# Patient Record
Sex: MALE | Race: BLACK OR AFRICAN AMERICAN | Employment: OTHER | URBAN - METROPOLITAN AREA
[De-identification: names, ages, dates, MRNs, and addresses within clinical notes are randomized per-mention and may not be internally consistent; named-entity substitution may affect disease eponyms.]

---

## 2018-12-18 ENCOUNTER — OP HISTORICAL/CONVERTED ENCOUNTER (OUTPATIENT)
Dept: OTHER | Age: 38
End: 2018-12-18

## 2018-12-30 ENCOUNTER — IP HISTORICAL/CONVERTED ENCOUNTER (OUTPATIENT)
Dept: OTHER | Age: 38
End: 2018-12-30

## 2019-02-14 ENCOUNTER — APPOINTMENT (OUTPATIENT)
Dept: GENERAL RADIOLOGY | Age: 39
End: 2019-02-14
Attending: NURSE PRACTITIONER
Payer: MEDICARE

## 2019-02-14 ENCOUNTER — HOSPITAL ENCOUNTER (EMERGENCY)
Age: 39
Discharge: HOME | End: 2019-02-14
Attending: EMERGENCY MEDICINE
Payer: MEDICARE

## 2019-02-14 VITALS
HEART RATE: 98 BPM | TEMPERATURE: 97.7 F | DIASTOLIC BLOOD PRESSURE: 65 MMHG | SYSTOLIC BLOOD PRESSURE: 94 MMHG | RESPIRATION RATE: 18 BRPM | OXYGEN SATURATION: 97 %

## 2019-02-14 DIAGNOSIS — J95.03 TRACHEOSTOMY MECHANICAL COMPLICATION (HCC): Primary | ICD-10-CM

## 2019-02-14 PROCEDURE — 89220 SPUTUM SPECIMEN COLLECTION: CPT

## 2019-02-14 PROCEDURE — 99283 EMERGENCY DEPT VISIT LOW MDM: CPT

## 2019-02-14 PROCEDURE — 71045 X-RAY EXAM CHEST 1 VIEW: CPT

## 2019-02-14 PROCEDURE — 77030018836 HC SOL IRR NACL ICUM -A

## 2019-02-14 RX ORDER — MIRTAZAPINE 7.5 MG/1
7.5 TABLET, FILM COATED ORAL
COMMUNITY

## 2019-02-14 RX ORDER — METFORMIN HYDROCHLORIDE 500 MG/1
TABLET ORAL 2 TIMES DAILY WITH MEALS
COMMUNITY

## 2019-02-14 RX ORDER — ACETAMINOPHEN 325 MG/1
650 TABLET ORAL
COMMUNITY

## 2019-02-14 RX ORDER — BUPROPION HYDROCHLORIDE 150 MG/1
TABLET, EXTENDED RELEASE ORAL
COMMUNITY
Start: 2018-11-07

## 2019-02-14 RX ORDER — OMEPRAZOLE 20 MG/1
20 CAPSULE, DELAYED RELEASE ORAL DAILY
COMMUNITY

## 2019-02-14 RX ORDER — GABAPENTIN 600 MG/1
TABLET ORAL
COMMUNITY
Start: 2018-11-21

## 2019-02-14 RX ORDER — LEVETIRACETAM 500 MG/1
500 TABLET, EXTENDED RELEASE ORAL DAILY
COMMUNITY

## 2019-02-14 RX ORDER — CYCLOBENZAPRINE HCL 10 MG
TABLET ORAL
COMMUNITY

## 2019-02-14 RX ORDER — FENTANYL 25 UG/1
PATCH TRANSDERMAL
COMMUNITY
Start: 2018-12-05

## 2019-02-14 RX ORDER — DULOXETIN HYDROCHLORIDE 60 MG/1
CAPSULE, DELAYED RELEASE ORAL
COMMUNITY
Start: 2018-12-15

## 2019-02-14 RX ORDER — GLIPIZIDE 10 MG/1
TABLET, FILM COATED, EXTENDED RELEASE ORAL
COMMUNITY
Start: 2018-12-05

## 2019-02-14 RX ORDER — BACLOFEN 20 MG/1
TABLET ORAL
COMMUNITY
Start: 2018-11-21

## 2019-02-14 NOTE — ED PROVIDER NOTES
6:21 PM  
34 y.o. male presents to ED C/O trach problem. Patient has a HX of quadriplegia, seizures, depression. Patient reports his secretions have been thicker for two days, he denies associated SOB but the trach suctioning at the facility he likes in wasn't working and he wanted to get better suctioning so he called 911. Patient reports he has had problems developed in the past when he wasn't suctioned well and he didn't want that to happen. paitent denies complaints - SOB, cough, fever, abdominal pain, N/V/D, only complaint is need for suctioning. Patient denies any other symptoms or complaints. Past Medical History:  
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 No pertinent surgical history. History reviewed. No pertinent family history. Social History Socioeconomic History  Marital status: SINGLE Spouse name: Not on file  Number of children: Not on file  Years of education: Not on file  Highest education level: Not on file Social Needs  Financial resource strain: Not on file  Food insecurity - worry: Not on file  Food insecurity - inability: Not on file  Transportation needs - medical: Not on file  Transportation needs - non-medical: Not on file Occupational History  Not on file Tobacco Use  Smoking status: Never Smoker  Smokeless tobacco: Never Used Substance and Sexual Activity  Alcohol use: No  
  Frequency: Never  Drug use: Not on file  Sexual activity: Not on file Other Topics Concern  Not on file Social History Narrative  Not on file ALLERGIES: Patient has no known allergies. Review of Systems Constitutional: Negative for activity change, appetite change, chills, fatigue and fever. HENT: Negative for congestion, ear pain, rhinorrhea and sore throat. Eyes: Negative for pain, discharge, redness and itching. Respiratory: Negative for cough, chest tightness, shortness of breath and wheezing. Increased trach secretions Cardiovascular: Negative for chest pain and palpitations. Gastrointestinal: Negative for abdominal pain, blood in stool, constipation, diarrhea, nausea and vomiting. Endocrine: Negative for polyuria. Genitourinary: Negative for discharge, dysuria, flank pain, hematuria, penile pain and testicular pain. Musculoskeletal: Negative for back pain, joint swelling and neck pain. Skin: Negative for rash and wound. Allergic/Immunologic: Negative for immunocompromised state. Neurological: Negative for dizziness, weakness, light-headedness, numbness and headaches. Hematological: Negative for adenopathy. Psychiatric/Behavioral: Negative for agitation and confusion. The patient is not nervous/anxious. All other systems reviewed and are negative. Vitals:  
 02/14/19 1800 02/14/19 1853 02/14/19 1920 02/14/19 1937 BP: 109/76 Pulse:  (!) 103  98 Resp:   18 Temp:   97.7 °F (36.5 °C) SpO2: 100% Physical Exam  
Constitutional: He is oriented to person, place, and time. Chronically ill appearing male HENT:  
Right Ear: External ear normal.  
Left Ear: External ear normal.  
Mouth/Throat: Mucous membranes are dry. Eyes: Conjunctivae and EOM are normal.  
Neck:  
Trach in place, no secretions noted around placement Cardiovascular: Regular rhythm. Tachycardia present. Pulmonary/Chest: He has decreased breath sounds in the right lower field and the left lower field. Speaking in complete sentences, no distress noted Musculoskeletal:  
Quadriplegic, atrophy of all extremities Neurological: He is alert and oriented to person, place, and time. Skin: Skin is warm and dry. Pressure ulcer sacral area Nursing note and vitals reviewed. MDM Number of Diagnoses or Management Options Tracheostomy mechanical complication [facility]):  
Diagnosis management comments: MDM: 
ddx - need for trach care vs developing URI, will get chest xray to evaluate 7:38 PM no acute abnormality on chest xray, patient denies SOB, denies any complaints. Patient agrees he is ready to return to facility. Informed of chest xray results and ordered transport to facility. Referred to PCP as needed. Patient educated to return to the ED for any new or worsening symptoms. Patient denies questions. Amount and/or Complexity of Data Reviewed Tests in the radiology section of CPT®: ordered and reviewed Independent visualization of images, tracings, or specimens: yes ED Course as of Feb 14 1939 Thu Feb 14, 2019  
1831 Patient feels better after resp has suctioned him, denies complaints  [SH] 1932 Chest xray - no acute cardiopulmonary abnormality   [SH] ED Course User Index [SH] Jorge Amaya NP Procedures RESULTS: 
 
XR CHEST PORT    (Results Pending) Labs Reviewed - No data to display No results found for this or any previous visit (from the past 12 hour(s)). PROGRESS NOTE:  
6:21 PM  
Initial assessment completed. Written by Matthew Chapman NP-C 
 
DISCHARGE NOTE: 
7:39 PM  
Jenny Brooks  results have been reviewed with him. He has been counseled regarding his diagnosis, treatment, and plan.   He verbally conveys understanding and agreement of the signs, symptoms, diagnosis, treatment and prognosis and additionally agrees to follow up as discussed. He also agrees with the care-plan and conveys that all of his questions have been answered. I have also provided discharge instructions for him that include: educational information regarding their diagnosis and treatment, and list of reasons why they would want to return to the ED prior to their follow-up appointment, should his condition change. CLINICAL IMPRESSION: 
 
1. Tracheostomy mechanical complication ([address] AFTER VISIT PLAN: 
 
Current Discharge Medication List  
  
  
 
Follow-up Information Follow up With Specialties Details Why Contact Info Schedule an appointment as soon as possible for a visit in 1 day Further evaluation YOUR PCP Written by Sana LUQUE

## 2019-02-14 NOTE — ED TRIAGE NOTES
Pt arrived from [facility] with complaint of needing to be suctioned more than normal. Per staff, they said patient only usually gets suctioned once a day.

## 2019-02-14 NOTE — PROGRESS NOTES
Suctioned moderate amount of thick white secretions from trach. Alessia Aschoff is secure, patent and midline. Suction catheter passed with no resistance. spo2 pre suction 95%,  post suction 98%. Gauze changed stoma clean and with no signs of infection. spo2 at this time 100%

## 2019-02-15 ENCOUNTER — APPOINTMENT (OUTPATIENT)
Dept: GENERAL RADIOLOGY | Age: 39
End: 2019-02-15
Attending: EMERGENCY MEDICINE
Payer: MEDICARE

## 2019-02-15 ENCOUNTER — HOSPITAL ENCOUNTER (EMERGENCY)
Age: 39
Discharge: NURSING, REHAB OR LONG-TERM CARE | End: 2019-02-15
Attending: EMERGENCY MEDICINE
Payer: MEDICARE

## 2019-02-15 VITALS
DIASTOLIC BLOOD PRESSURE: 99 MMHG | WEIGHT: 155 LBS | OXYGEN SATURATION: 100 % | TEMPERATURE: 98.6 F | SYSTOLIC BLOOD PRESSURE: 147 MMHG | HEART RATE: 103 BPM | RESPIRATION RATE: 17 BRPM

## 2019-02-15 DIAGNOSIS — J95.03 TRACHEOSTOMY MECHANICAL COMPLICATION (HCC): Primary | ICD-10-CM

## 2019-02-15 LAB
ALBUMIN SERPL-MCNC: 3.4 G/DL (ref 3.4–5)
ALBUMIN/GLOB SERPL: 0.7 {RATIO} (ref 0.8–1.7)
ALP SERPL-CCNC: 142 U/L (ref 45–117)
ALT SERPL-CCNC: 19 U/L (ref 16–61)
ANION GAP SERPL CALC-SCNC: 10 MMOL/L (ref 3–18)
APPEARANCE UR: CLEAR
AST SERPL-CCNC: 18 U/L (ref 15–37)
ATRIAL RATE: 106 BPM
BASOPHILS # BLD: 0.1 K/UL (ref 0–0.1)
BASOPHILS NFR BLD: 1 % (ref 0–2)
BILIRUB SERPL-MCNC: 0.3 MG/DL (ref 0.2–1)
BILIRUB UR QL: NEGATIVE
BUN SERPL-MCNC: 16 MG/DL (ref 7–18)
BUN/CREAT SERPL: 21 (ref 12–20)
CALCIUM SERPL-MCNC: 8.9 MG/DL (ref 8.5–10.1)
CALCULATED P AXIS, ECG09: 64 DEGREES
CALCULATED R AXIS, ECG10: 18 DEGREES
CALCULATED T AXIS, ECG11: 68 DEGREES
CHLORIDE SERPL-SCNC: 100 MMOL/L (ref 100–108)
CO2 SERPL-SCNC: 29 MMOL/L (ref 21–32)
COLOR UR: YELLOW
CREAT SERPL-MCNC: 0.75 MG/DL (ref 0.6–1.3)
DIAGNOSIS, 93000: NORMAL
DIFFERENTIAL METHOD BLD: ABNORMAL
EOSINOPHIL # BLD: 0.4 K/UL (ref 0–0.4)
EOSINOPHIL NFR BLD: 9 % (ref 0–5)
ERYTHROCYTE [DISTWIDTH] IN BLOOD BY AUTOMATED COUNT: 16.8 % (ref 11.6–14.5)
GLOBULIN SER CALC-MCNC: 4.8 G/DL (ref 2–4)
GLUCOSE SERPL-MCNC: 100 MG/DL (ref 74–99)
GLUCOSE UR STRIP.AUTO-MCNC: NEGATIVE MG/DL
HCT VFR BLD AUTO: 38.3 % (ref 36–48)
HGB BLD-MCNC: 11.5 G/DL (ref 13–16)
HGB UR QL STRIP: NEGATIVE
KETONES UR QL STRIP.AUTO: NEGATIVE MG/DL
LACTATE BLD-SCNC: 0.91 MMOL/L (ref 0.4–2)
LACTATE BLD-SCNC: 4.45 MMOL/L (ref 0.4–2)
LEUKOCYTE ESTERASE UR QL STRIP.AUTO: NEGATIVE
LYMPHOCYTES # BLD: 1.2 K/UL (ref 0.9–3.6)
LYMPHOCYTES NFR BLD: 25 % (ref 21–52)
MCH RBC QN AUTO: 24.9 PG (ref 24–34)
MCHC RBC AUTO-ENTMCNC: 30 G/DL (ref 31–37)
MCV RBC AUTO: 83.1 FL (ref 74–97)
MONOCYTES # BLD: 0.3 K/UL (ref 0.05–1.2)
MONOCYTES NFR BLD: 7 % (ref 3–10)
NEUTS SEG # BLD: 2.9 K/UL (ref 1.8–8)
NEUTS SEG NFR BLD: 58 % (ref 40–73)
NITRITE UR QL STRIP.AUTO: NEGATIVE
P-R INTERVAL, ECG05: 128 MS
PH UR STRIP: 5.5 [PH] (ref 5–8)
PLATELET # BLD AUTO: 338 K/UL (ref 135–420)
PMV BLD AUTO: 9.8 FL (ref 9.2–11.8)
POTASSIUM SERPL-SCNC: 4.2 MMOL/L (ref 3.5–5.5)
PROT SERPL-MCNC: 8.2 G/DL (ref 6.4–8.2)
PROT UR STRIP-MCNC: NEGATIVE MG/DL
Q-T INTERVAL, ECG07: 324 MS
QRS DURATION, ECG06: 84 MS
QTC CALCULATION (BEZET), ECG08: 430 MS
RBC # BLD AUTO: 4.61 M/UL (ref 4.7–5.5)
SODIUM SERPL-SCNC: 139 MMOL/L (ref 136–145)
SP GR UR REFRACTOMETRY: 1.02 (ref 1–1.03)
TROPONIN I SERPL-MCNC: <0.02 NG/ML (ref 0–0.04)
UROBILINOGEN UR QL STRIP.AUTO: 0.2 EU/DL (ref 0.2–1)
VENTRICULAR RATE, ECG03: 106 BPM
WBC # BLD AUTO: 4.9 K/UL (ref 4.6–13.2)

## 2019-02-15 PROCEDURE — 96360 HYDRATION IV INFUSION INIT: CPT

## 2019-02-15 PROCEDURE — 99285 EMERGENCY DEPT VISIT HI MDM: CPT

## 2019-02-15 PROCEDURE — 96361 HYDRATE IV INFUSION ADD-ON: CPT

## 2019-02-15 PROCEDURE — 84484 ASSAY OF TROPONIN QUANT: CPT

## 2019-02-15 PROCEDURE — 80053 COMPREHEN METABOLIC PANEL: CPT

## 2019-02-15 PROCEDURE — 81003 URINALYSIS AUTO W/O SCOPE: CPT

## 2019-02-15 PROCEDURE — 87040 BLOOD CULTURE FOR BACTERIA: CPT

## 2019-02-15 PROCEDURE — 71045 X-RAY EXAM CHEST 1 VIEW: CPT

## 2019-02-15 PROCEDURE — 85025 COMPLETE CBC W/AUTO DIFF WBC: CPT

## 2019-02-15 PROCEDURE — 93005 ELECTROCARDIOGRAM TRACING: CPT

## 2019-02-15 PROCEDURE — 74011000258 HC RX REV CODE- 258: Performed by: EMERGENCY MEDICINE

## 2019-02-15 PROCEDURE — 83605 ASSAY OF LACTIC ACID: CPT

## 2019-02-15 PROCEDURE — 74011250636 HC RX REV CODE- 250/636: Performed by: EMERGENCY MEDICINE

## 2019-02-15 RX ORDER — SODIUM CHLORIDE 0.9 % (FLUSH) 0.9 %
5-10 SYRINGE (ML) INJECTION AS NEEDED
Status: DISCONTINUED | OUTPATIENT
Start: 2019-02-15 | End: 2019-02-15 | Stop reason: HOSPADM

## 2019-02-15 RX ADMIN — SODIUM CHLORIDE 1000 ML: 900 INJECTION, SOLUTION INTRAVENOUS at 09:35

## 2019-02-15 RX ADMIN — SODIUM CHLORIDE 109 ML: 900 INJECTION, SOLUTION INTRAVENOUS at 09:35

## 2019-02-15 NOTE — ED NOTES
Discharge information reviewed with patient, patient awaiting transport back to [facility].

## 2019-02-15 NOTE — ED NOTES
I have reviewed discharge instructions with the patient. The patient verbalized understanding. Pt unable to sign paperwork due to quadriplegia.

## 2019-02-15 NOTE — ED PROVIDER NOTES
EMERGENCY DEPARTMENT HISTORY AND PHYSICAL EXAM 
 
7:47 AM 
 
 
Date: 2/15/2019 Patient Name: Carlos Gilliland History of Presenting Illness Chief Complaint Patient presents with  Shortness of Breath History Provided By: Patient and EMS Additional History (Context): Carlos Gilliland is a 34 y.o. male with CKD, DM, quadriplegia, cecostomy, tracheostomy who presents with acute dypsnea. Pt describes chest tightness starting this morning but denies hx asthma or COPD and has not had to use breathing treatments. He notes frequent clear and thick secretions which contribute to his dyspnea and prompted 911 call for suctioning. He also notes subjective fever. He denies recent cough, abd pain. He was seen yesterday for the same. EMS reported spO2 89% on arrival which  improved to 98% after suction. Pt states he is not normally on O2. Pt received his flu immunization in the fall. PCP: None Chief Complaint: Dyspnea Duration:  Hours Timing:  Acute Location: chest, tracheostomy Quality: secrections in trach Severity: N/A as complaint is not pain Modifying Factors: improved by suction and O2 Associated Symptoms: subjective fever Current Facility-Administered Medications Medication Dose Route Frequency Provider Last Rate Last Dose  sodium chloride (NS) flush 5-10 mL  5-10 mL IntraVENous PRN Willis Bravo MD      
 sodium chloride (NS) flush 5-10 mL  5-10 mL IntraVENous PRN Willis Bravo MD      
 
Current Outpatient Medications Medication Sig Dispense Refill  baclofen (LIORESAL) 20 mg tablet  buPROPion SR (WELLBUTRIN SR) 150 mg SR tablet  DULoxetine (CYMBALTA) 60 mg capsule  fentaNYL (DURAGESIC) 25 mcg/hr PATCH     
 gabapentin (NEURONTIN) 600 mg tablet  glipiZIDE SR (GLUCOTROL XL) 10 mg CR tablet  cyclobenzaprine (FLEXERIL) 10 mg tablet Take  by mouth three (3) times daily as needed for Muscle Spasm(s).  lactobacillus acidoph-pectin (ACIDOPHILUS-PECTIN) 75 million cell -100 mg cap capsule Take 1 Cap by mouth daily.  levETIRAcetam (KEPPRA XR) 500 mg ER tablet Take 500 mg by mouth daily.  metFORMIN (GLUCOPHAGE) 500 mg tablet Take  by mouth two (2) times daily (with meals).  omeprazole (PRILOSEC) 20 mg capsule Take 20 mg by mouth daily.  amino acids/protein hydrolys (PRO-STAT SUGAR FREE PO) Take  by mouth.  mirtazapine (REMERON) 7.5 mg tablet Take 7.5 mg by mouth nightly.  collagenase (SANTYL) 250 unit/gram ointment Apply  to affected area daily.  acetaminophen (TYLENOL) 325 mg tablet Take 650 mg by mouth every four (4) hours as needed for Pain. Past History Past Medical History: 
Past Medical History:  
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 
None Family History: 
None Social History: 
Social History Tobacco Use  Smoking status: Never Smoker  Smokeless tobacco: Never Used Substance Use Topics  Alcohol use: No  
  Frequency: Never  Drug use: Not on file Allergies: 
No Known Allergies Review of Systems Review of Systems Constitutional: Positive for fever. Negative for chills. HENT: Negative for congestion and sore throat. Respiratory: Positive for chest tightness. Negative for cough and shortness of breath.   
     + dyspnea Cardiovascular: Negative for chest pain. Gastrointestinal: Negative for abdominal pain, diarrhea, nausea and vomiting. Genitourinary: Negative for dysuria. Musculoskeletal: Negative for back pain. Skin: Negative for rash. Neurological: Negative for dizziness and headaches. All other systems reviewed and are negative. Physical Exam  
 
Visit Vitals BP (!) 135/93 Pulse 92 Temp 98.6 °F (37 °C) Resp 18 Wt 70.3 kg (155 lb) SpO2 99% Physical Exam 
General Exam: Patient is a well developed and well nourished in no distress. Chronically ill appearing. Eye Exam: Lids and conjunctiva are normal 
ENT Exam: The general head and facial exam is normal.  The neck is supple without meningeal signs. No significant adenopathy. Tracheostomy. Pulmonary Exam: No respiratory distress. The respiratory rate is tachypneic. No stridor. The breath sounds are equal bilaterally. There are no wheezes, rales, or rhonchi noted. Cardiac Exam: The cardiac rate and rhythm are normal.  No significant murmurs, rubs, or gallops. The peripheral pulses are normal. 
Abdominal Exam: Abdomen is soft and non-distended. No pulsatile masses. There is no local tenderness. There is no rebound or guarding noted. Ostomy with well perfused stoma. Skin and Soft Tissue: The skin is warm and dry, without significant abnormality. Good color. Musculoskeletal Exam: No peripheral edema. Pt is quadriplegic. Psychiatric: Normal adult with appropriate demeanor and interpersonal interaction. Is oriented to person, place, and time. Diagnostic Study Results Labs - Recent Results (from the past 12 hour(s)) METABOLIC PANEL, COMPREHENSIVE Collection Time: 02/15/19  8:24 AM  
Result Value Ref Range Sodium 139 136 - 145 mmol/L Potassium 4.2 3.5 - 5.5 mmol/L Chloride 100 100 - 108 mmol/L  
 CO2 29 21 - 32 mmol/L Anion gap 10 3.0 - 18 mmol/L Glucose 100 (H) 74 - 99 mg/dL BUN 16 7.0 - 18 MG/DL  Creatinine 0.75 0.6 - 1.3 MG/DL  
 BUN/Creatinine ratio 21 (H) 12 - 20 GFR est AA >60 >60 ml/min/1.73m2 GFR est non-AA >60 >60 ml/min/1.73m2 Calcium 8.9 8.5 - 10.1 MG/DL Bilirubin, total 0.3 0.2 - 1.0 MG/DL  
 ALT (SGPT) 19 16 - 61 U/L  
 AST (SGOT) 18 15 - 37 U/L Alk. phosphatase 142 (H) 45 - 117 U/L Protein, total 8.2 6.4 - 8.2 g/dL Albumin 3.4 3.4 - 5.0 g/dL Globulin 4.8 (H) 2.0 - 4.0 g/dL A-G Ratio 0.7 (L) 0.8 - 1.7    
CBC WITH AUTOMATED DIFF Collection Time: 02/15/19  8:24 AM  
Result Value Ref Range WBC 4.9 4.6 - 13.2 K/uL  
 RBC 4.61 (L) 4.70 - 5.50 M/uL  
 HGB 11.5 (L) 13.0 - 16.0 g/dL HCT 38.3 36.0 - 48.0 % MCV 83.1 74.0 - 97.0 FL  
 MCH 24.9 24.0 - 34.0 PG  
 MCHC 30.0 (L) 31.0 - 37.0 g/dL  
 RDW 16.8 (H) 11.6 - 14.5 % PLATELET 346 844 - 642 K/uL MPV 9.8 9.2 - 11.8 FL  
 NEUTROPHILS 58 40 - 73 % LYMPHOCYTES 25 21 - 52 % MONOCYTES 7 3 - 10 % EOSINOPHILS 9 (H) 0 - 5 % BASOPHILS 1 0 - 2 %  
 ABS. NEUTROPHILS 2.9 1.8 - 8.0 K/UL  
 ABS. LYMPHOCYTES 1.2 0.9 - 3.6 K/UL  
 ABS. MONOCYTES 0.3 0.05 - 1.2 K/UL  
 ABS. EOSINOPHILS 0.4 0.0 - 0.4 K/UL  
 ABS. BASOPHILS 0.1 0.0 - 0.1 K/UL  
 DF AUTOMATED    
TROPONIN I Collection Time: 02/15/19  8:24 AM  
Result Value Ref Range Troponin-I, QT <0.02 0.0 - 0.045 NG/ML  
POC LACTIC ACID Collection Time: 02/15/19  8:25 AM  
Result Value Ref Range Lactic Acid (POC) 4.45 (HH) 0.40 - 2.00 mmol/L  
EKG, 12 LEAD, INITIAL Collection Time: 02/15/19  8:25 AM  
Result Value Ref Range Ventricular Rate 106 BPM  
 Atrial Rate 106 BPM  
 P-R Interval 128 ms QRS Duration 84 ms Q-T Interval 324 ms QTC Calculation (Bezet) 430 ms Calculated P Axis 64 degrees Calculated R Axis 18 degrees Calculated T Axis 68 degrees Diagnosis Sinus tachycardia Possible Left atrial enlargement Left ventricular hypertrophy Nonspecific T wave abnormality Abnormal ECG No previous ECGs available URINALYSIS W/ RFLX MICROSCOPIC  
 Collection Time: 02/15/19  8:30 AM  
Result Value Ref Range Color YELLOW Appearance CLEAR Specific gravity 1.018 1.005 - 1.030    
 pH (UA) 5.5 5.0 - 8.0 Protein NEGATIVE  NEG mg/dL Glucose NEGATIVE  NEG mg/dL Ketone NEGATIVE  NEG mg/dL Bilirubin NEGATIVE  NEG Blood NEGATIVE  NEG Urobilinogen 0.2 0.2 - 1.0 EU/dL Nitrites NEGATIVE  NEG Leukocyte Esterase NEGATIVE  NEG    
POC LACTIC ACID Collection Time: 02/15/19 10:53 AM  
Result Value Ref Range Lactic Acid (POC) 0.91 0.40 - 2.00 mmol/L Radiologic Studies -  
XR CHEST PORT Final Result Impression: 1. Tracheostomy catheter in stable position. 2. Faint linear area of atelectasis or scarring at the left lung base, unchanged  
without superimposed acute radiographic abnormality. Medical Decision Making It should be noted that IoCra MD will be the provider of record for this patient. I reviewed the vital signs, available nursing notes, past medical history, past surgical history, family history and social history. Vital Signs-Reviewed the patient's vital signs. Pulse Oximetry Analysis -  100% on 8L of O2 via NC (Interpretation) wnl 
 
Cardiac Monitor: 
Rate: 110 Rhythm:  Sinus Tachycardia EKG: Interpreted by the EP. Time Interpreted: 1 Rate: 106 Rhythm: Sinus Tachycardia Interpretation: no STEMI Records Reviewed: Nursing Notes (Time of Review: 7:47 AM) ED Course: Progress Notes, Reevaluation, and Consults: 
 
10:12 Pt resting comfortably, normal O2 sats on RA, nml respiratory effort, no longer tachycardic. Elevated lactic is likely not related to sepsis as no fever, normal WBC, no source of infection, and pt feeling better. Will continue to hydrate and recheck. 11:46 Feels better, comfortable with plan for DC. He says suction machine at his nursing home doesn't seem very strong.  I encouraged him that he may need to be suctioned more frequently Provider Notes (Medical Decision Making): Pt with trach presenting with SOB/chest tightness which improved after being suctioned. +SIRS so sepsis eval initiated. Lactic elevated. Otherwise, Labs unremarkable. Do not suspect PE or CHF. Doubt ACS. Pt suctioned by RT. Lactic normal on repeat. Pt feeling back to baseline. Do think he requires admission at this time and pt is comfortable returning to NH. Procedures:  
 
Indication: unable to get IV access/blood Skin Preparation: Hand hygiene performed prior to venous catheter insertion. The area was cleansed and prepped with alcohol. Procedure: 20 gauge IV placed in L AC location. Vascular probe used in live manner. Vein identified. 2 attempts. IV secured. Good venous flow. No complication. Assessment: Good patency and blood return. Post-procedure: Patient tolerated the procedure well with no immediate complications. Performed by Tyler Yang MD 8:45 Diagnosis Clinical Impression: 1. Tracheostomy mechanical complication ([address] Disposition: Discharge Follow-up Information None Medication List  
  
ASK your doctor about these medications   
baclofen 20 mg tablet Commonly known as:  LIORESAL 
  
buPROPion  mg SR tablet Commonly known as:  WELLBUTRIN SR 
  
cyclobenzaprine 10 mg tablet Commonly known as:  FLEXERIL 
  
DULoxetine 60 mg capsule Commonly known as:  CYMBALTA 
  
fentaNYL 25 mcg/hr PATCH Commonly known as:  DURAGESIC 
  
gabapentin 600 mg tablet Commonly known as:  NEURONTIN 
  
glipiZIDE SR 10 mg CR tablet Commonly known as:  GLUCOTROL XL 
  
lactobacillus acidoph-pectin 75 million cell -100 mg Cap capsule Commonly known as:  ACIDOPHILUS-PECTIN 
  
levETIRAcetam 500 mg ER tablet Commonly known as:  KEPPRA XR 
  
metFORMIN 500 mg tablet Commonly known as:  GLUCOPHAGE 
  
mirtazapine 7.5 mg tablet Commonly known as:  Tea Cortes 
  
 omeprazole 20 mg capsule Commonly known as:  PRILOSEC PRO-STAT SUGAR FREE PO 
  
SANTYL 250 unit/gram ointment Generic drug:  collagenase TYLENOL 325 mg tablet Generic drug:  acetaminophen 
  
  
 
_______________________________ Scribe Attestation Kershaw Backer acting as a scribe for and in the presence of Maxwell Clemons MD     
February 15, 2019 at 7:47 AM 
    
Provider Attestation:     
I personally performed the services described in the documentation, reviewed the documentation, as recorded by the scribe in my presence, and it accurately and completely records my words and actions. February 15, 2019 at 7:47 AM - Maxwell Clemons MD   
 
 
_______________________________

## 2019-02-15 NOTE — ED TRIAGE NOTES
Pt arrives from [facility] for buildup of secretions in trach. Pt requiring frequent suctioning per medics. Was seen here last night for same.

## 2019-02-15 NOTE — ED NOTES
Attempt x 2 to establish IV access. Unsuccessful. Dr Ewell Schilder to perform ultrasound guided IV.

## 2019-02-15 NOTE — PROGRESS NOTES
-[id number]-Called to pt. Bedside for assessment for suctioned. Pt. Noted to have a #[id number] shiley trach in place. which is patent and secure. Pt. Was placed on Trach collar via venti device. FIO2-28% 3lpm flow. O2 Sats. 100% HR-112, RR-28. Pt. Suctioned for moderate amount of clear/white secretions. -[address]

## 2019-02-15 NOTE — DISCHARGE INSTRUCTIONS
PLEASE MAKE SURE TO SUCTION MR. CLARKE' TRACHEOSTOMY MORE FREQUENTLY. RETURN TO ER WITH ANY WORSENING SYMPTOMS.

## 2019-02-21 LAB
BACTERIA SPEC CULT: NORMAL
BACTERIA SPEC CULT: NORMAL
SERVICE CMNT-IMP: NORMAL
SERVICE CMNT-IMP: NORMAL

## 2019-02-23 ENCOUNTER — APPOINTMENT (OUTPATIENT)
Dept: CT IMAGING | Age: 39
DRG: 871 | End: 2019-02-23
Attending: HOSPITALIST
Payer: MEDICARE

## 2019-02-23 ENCOUNTER — APPOINTMENT (OUTPATIENT)
Dept: GENERAL RADIOLOGY | Age: 39
DRG: 871 | End: 2019-02-23
Attending: EMERGENCY MEDICINE
Payer: MEDICARE

## 2019-02-23 ENCOUNTER — HOSPITAL ENCOUNTER (INPATIENT)
Age: 39
LOS: 2 days | Discharge: NURSING, REHAB OR LONG-TERM CARE | DRG: 871 | End: 2019-02-25
Attending: EMERGENCY MEDICINE | Admitting: HOSPITALIST
Payer: MEDICARE

## 2019-02-23 DIAGNOSIS — J39.8 INCREASED TRACHEAL SECRETIONS: ICD-10-CM

## 2019-02-23 DIAGNOSIS — R09.02 HYPOXIA: ICD-10-CM

## 2019-02-23 DIAGNOSIS — A41.9 SEPSIS, DUE TO UNSPECIFIED ORGANISM: Primary | ICD-10-CM

## 2019-02-23 PROBLEM — J96.21 ACUTE ON CHRONIC RESPIRATORY FAILURE WITH HYPOXIA (HCC): Status: ACTIVE | Noted: 2019-02-23

## 2019-02-23 PROBLEM — R56.9 SEIZURES (HCC): Status: ACTIVE | Noted: 2019-02-23

## 2019-02-23 PROBLEM — N39.0 UTI (URINARY TRACT INFECTION): Status: ACTIVE | Noted: 2019-02-23

## 2019-02-23 PROBLEM — G82.50 QUADRIPLEGIA (HCC): Status: ACTIVE | Noted: 2019-02-23

## 2019-02-23 PROBLEM — E11.9 DM (DIABETES MELLITUS) (HCC): Status: ACTIVE | Noted: 2019-02-23

## 2019-02-23 PROBLEM — K21.9 GERD (GASTROESOPHAGEAL REFLUX DISEASE): Status: ACTIVE | Noted: 2019-02-23

## 2019-02-23 LAB
ALBUMIN SERPL-MCNC: 3.1 G/DL (ref 3.4–5)
ALBUMIN/GLOB SERPL: 0.7 {RATIO} (ref 0.8–1.7)
ALP SERPL-CCNC: 122 U/L (ref 45–117)
ALT SERPL-CCNC: 15 U/L (ref 16–61)
ANION GAP SERPL CALC-SCNC: 11 MMOL/L (ref 3–18)
ANION GAP SERPL CALC-SCNC: 5 MMOL/L (ref 3–18)
APPEARANCE UR: ABNORMAL
ARTERIAL PATENCY WRIST A: ABNORMAL
AST SERPL-CCNC: 12 U/L (ref 15–37)
ATRIAL RATE: 117 BPM
BACTERIA URNS QL MICRO: ABNORMAL /HPF
BASE EXCESS BLDV CALC-SCNC: 2 MMOL/L
BASOPHILS # BLD: 0 K/UL (ref 0–0.1)
BASOPHILS NFR BLD: 0 % (ref 0–2)
BDY SITE: ABNORMAL
BILIRUB SERPL-MCNC: 0.1 MG/DL (ref 0.2–1)
BILIRUB UR QL: NEGATIVE
BODY TEMPERATURE: 99.5
BUN SERPL-MCNC: 13 MG/DL (ref 7–18)
BUN SERPL-MCNC: 18 MG/DL (ref 7–18)
BUN/CREAT SERPL: 24 (ref 12–20)
BUN/CREAT SERPL: 24 (ref 12–20)
CALCIUM SERPL-MCNC: 7.5 MG/DL (ref 8.5–10.1)
CALCIUM SERPL-MCNC: 8.6 MG/DL (ref 8.5–10.1)
CALCULATED P AXIS, ECG09: 78 DEGREES
CALCULATED R AXIS, ECG10: 57 DEGREES
CALCULATED T AXIS, ECG11: 80 DEGREES
CHLORIDE SERPL-SCNC: 102 MMOL/L (ref 100–108)
CHLORIDE SERPL-SCNC: 110 MMOL/L (ref 100–108)
CO2 SERPL-SCNC: 25 MMOL/L (ref 21–32)
CO2 SERPL-SCNC: 26 MMOL/L (ref 21–32)
COLOR UR: YELLOW
CREAT SERPL-MCNC: 0.55 MG/DL (ref 0.6–1.3)
CREAT SERPL-MCNC: 0.74 MG/DL (ref 0.6–1.3)
DIAGNOSIS, 93000: NORMAL
DIFFERENTIAL METHOD BLD: ABNORMAL
EOSINOPHIL # BLD: 0.7 K/UL (ref 0–0.4)
EOSINOPHIL NFR BLD: 5 % (ref 0–5)
EPITH CASTS URNS QL MICRO: ABNORMAL /LPF (ref 0–5)
ERYTHROCYTE [DISTWIDTH] IN BLOOD BY AUTOMATED COUNT: 17.6 % (ref 11.6–14.5)
EST. AVERAGE GLUCOSE BLD GHB EST-MCNC: 126 MG/DL
FLUAV AG NPH QL IA: NEGATIVE
FLUBV AG NOSE QL IA: NEGATIVE
GAS FLOW.O2 O2 DELIVERY SYS: ABNORMAL L/MIN
GLOBULIN SER CALC-MCNC: 4.5 G/DL (ref 2–4)
GLUCOSE BLD STRIP.AUTO-MCNC: 83 MG/DL (ref 70–110)
GLUCOSE BLD STRIP.AUTO-MCNC: 95 MG/DL (ref 70–110)
GLUCOSE SERPL-MCNC: 109 MG/DL (ref 74–99)
GLUCOSE SERPL-MCNC: 99 MG/DL (ref 74–99)
GLUCOSE UR STRIP.AUTO-MCNC: NEGATIVE MG/DL
HBA1C MFR BLD: 6 % (ref 4.2–5.6)
HCO3 BLDV-SCNC: 27.8 MMOL/L (ref 23–28)
HCT VFR BLD AUTO: 33.2 % (ref 36–48)
HGB BLD-MCNC: 10.3 G/DL (ref 13–16)
HGB UR QL STRIP: NEGATIVE
KETONES UR QL STRIP.AUTO: ABNORMAL MG/DL
LACTATE BLD-SCNC: 1.78 MMOL/L (ref 0.4–2)
LACTATE SERPL-SCNC: 0.7 MMOL/L (ref 0.4–2)
LEUKOCYTE ESTERASE UR QL STRIP.AUTO: ABNORMAL
LYMPHOCYTES # BLD: 1.4 K/UL (ref 0.9–3.6)
LYMPHOCYTES NFR BLD: 10 % (ref 21–52)
MCH RBC QN AUTO: 25.1 PG (ref 24–34)
MCHC RBC AUTO-ENTMCNC: 31 G/DL (ref 31–37)
MCV RBC AUTO: 81 FL (ref 74–97)
MONOCYTES # BLD: 1 K/UL (ref 0.05–1.2)
MONOCYTES NFR BLD: 7 % (ref 3–10)
NEUTS SEG # BLD: 11.2 K/UL (ref 1.8–8)
NEUTS SEG NFR BLD: 78 % (ref 40–73)
NITRITE UR QL STRIP.AUTO: POSITIVE
O2/TOTAL GAS SETTING VFR VENT: 100 %
P-R INTERVAL, ECG05: 126 MS
PCO2 BLDV: 49.8 MMHG (ref 41–51)
PH BLDV: 7.36 [PH] (ref 7.32–7.42)
PH UR STRIP: 7 [PH] (ref 5–8)
PLATELET # BLD AUTO: 336 K/UL (ref 135–420)
PMV BLD AUTO: 9.9 FL (ref 9.2–11.8)
PO2 BLDV: 43 MMHG (ref 25–40)
POTASSIUM SERPL-SCNC: 3.4 MMOL/L (ref 3.5–5.5)
POTASSIUM SERPL-SCNC: 3.8 MMOL/L (ref 3.5–5.5)
PROT SERPL-MCNC: 7.6 G/DL (ref 6.4–8.2)
PROT UR STRIP-MCNC: NEGATIVE MG/DL
Q-T INTERVAL, ECG07: 298 MS
QRS DURATION, ECG06: 84 MS
QTC CALCULATION (BEZET), ECG08: 415 MS
RBC # BLD AUTO: 4.1 M/UL (ref 4.7–5.5)
RBC #/AREA URNS HPF: ABNORMAL /HPF (ref 0–5)
SAO2 % BLDV: 74 % (ref 65–88)
SERVICE CMNT-IMP: ABNORMAL
SODIUM SERPL-SCNC: 138 MMOL/L (ref 136–145)
SODIUM SERPL-SCNC: 141 MMOL/L (ref 136–145)
SP GR UR REFRACTOMETRY: 1.02 (ref 1–1.03)
SPECIMEN TYPE: ABNORMAL
TOTAL RESP. RATE, ITRR: 26
UROBILINOGEN UR QL STRIP.AUTO: 0.2 EU/DL (ref 0.2–1)
VENTRICULAR RATE, ECG03: 117 BPM
WBC # BLD AUTO: 14.2 K/UL (ref 4.6–13.2)
WBC URNS QL MICRO: ABNORMAL /HPF (ref 0–4)

## 2019-02-23 PROCEDURE — 77030037878 HC DRSG MEPILEX >48IN BORD MOLN -B

## 2019-02-23 PROCEDURE — 83605 ASSAY OF LACTIC ACID: CPT

## 2019-02-23 PROCEDURE — 77030018836 HC SOL IRR NACL ICUM -A

## 2019-02-23 PROCEDURE — 36415 COLL VENOUS BLD VENIPUNCTURE: CPT

## 2019-02-23 PROCEDURE — 74011000258 HC RX REV CODE- 258: Performed by: EMERGENCY MEDICINE

## 2019-02-23 PROCEDURE — 87186 SC STD MICRODIL/AGAR DIL: CPT

## 2019-02-23 PROCEDURE — 87077 CULTURE AEROBIC IDENTIFY: CPT

## 2019-02-23 PROCEDURE — 65270000029 HC RM PRIVATE

## 2019-02-23 PROCEDURE — 74011250637 HC RX REV CODE- 250/637: Performed by: HOSPITALIST

## 2019-02-23 PROCEDURE — 81001 URINALYSIS AUTO W/SCOPE: CPT

## 2019-02-23 PROCEDURE — 94669 MECHANICAL CHEST WALL OSCILL: CPT

## 2019-02-23 PROCEDURE — 74011000258 HC RX REV CODE- 258: Performed by: HOSPITALIST

## 2019-02-23 PROCEDURE — 87804 INFLUENZA ASSAY W/OPTIC: CPT

## 2019-02-23 PROCEDURE — 96365 THER/PROPH/DIAG IV INF INIT: CPT

## 2019-02-23 PROCEDURE — 96367 TX/PROPH/DG ADDL SEQ IV INF: CPT

## 2019-02-23 PROCEDURE — 82962 GLUCOSE BLOOD TEST: CPT

## 2019-02-23 PROCEDURE — 77030018846 HC SOL IRR STRL H20 ICUM -A

## 2019-02-23 PROCEDURE — 87040 BLOOD CULTURE FOR BACTERIA: CPT

## 2019-02-23 PROCEDURE — 83036 HEMOGLOBIN GLYCOSYLATED A1C: CPT

## 2019-02-23 PROCEDURE — 74011250636 HC RX REV CODE- 250/636: Performed by: INTERNAL MEDICINE

## 2019-02-23 PROCEDURE — 87070 CULTURE OTHR SPECIMN AEROBIC: CPT

## 2019-02-23 PROCEDURE — 93005 ELECTROCARDIOGRAM TRACING: CPT

## 2019-02-23 PROCEDURE — 77010033678 HC OXYGEN DAILY

## 2019-02-23 PROCEDURE — 74011250636 HC RX REV CODE- 250/636: Performed by: EMERGENCY MEDICINE

## 2019-02-23 PROCEDURE — 94760 N-INVAS EAR/PLS OXIMETRY 1: CPT

## 2019-02-23 PROCEDURE — 82803 BLOOD GASES ANY COMBINATION: CPT

## 2019-02-23 PROCEDURE — 74011636320 HC RX REV CODE- 636/320: Performed by: EMERGENCY MEDICINE

## 2019-02-23 PROCEDURE — 77030009834 HC MSK O2 TRACH VYRM -A

## 2019-02-23 PROCEDURE — 80053 COMPREHEN METABOLIC PANEL: CPT

## 2019-02-23 PROCEDURE — 89220 SPUTUM SPECIMEN COLLECTION: CPT

## 2019-02-23 PROCEDURE — 85025 COMPLETE CBC W/AUTO DIFF WBC: CPT

## 2019-02-23 PROCEDURE — 71045 X-RAY EXAM CHEST 1 VIEW: CPT

## 2019-02-23 PROCEDURE — 74011250636 HC RX REV CODE- 250/636: Performed by: HOSPITALIST

## 2019-02-23 PROCEDURE — 71275 CT ANGIOGRAPHY CHEST: CPT

## 2019-02-23 PROCEDURE — 0BC18ZZ EXTIRPATION OF MATTER FROM TRACHEA, VIA NATURAL OR ARTIFICIAL OPENING ENDOSCOPIC: ICD-10-PCS | Performed by: INTERNAL MEDICINE

## 2019-02-23 PROCEDURE — 99285 EMERGENCY DEPT VISIT HI MDM: CPT

## 2019-02-23 RX ORDER — SODIUM CHLORIDE 0.9 % (FLUSH) 0.9 %
5-40 SYRINGE (ML) INJECTION EVERY 8 HOURS
Status: DISCONTINUED | OUTPATIENT
Start: 2019-02-23 | End: 2019-02-25 | Stop reason: HOSPADM

## 2019-02-23 RX ORDER — MIRTAZAPINE 15 MG/1
7.5 TABLET, FILM COATED ORAL
Status: DISCONTINUED | OUTPATIENT
Start: 2019-02-23 | End: 2019-02-25 | Stop reason: HOSPADM

## 2019-02-23 RX ORDER — MAGNESIUM SULFATE 100 %
4 CRYSTALS MISCELLANEOUS AS NEEDED
Status: DISCONTINUED | OUTPATIENT
Start: 2019-02-23 | End: 2019-02-25 | Stop reason: HOSPADM

## 2019-02-23 RX ORDER — DEXTROSE MONOHYDRATE AND SODIUM CHLORIDE 5; .45 G/100ML; G/100ML
75 INJECTION, SOLUTION INTRAVENOUS CONTINUOUS
Status: DISCONTINUED | OUTPATIENT
Start: 2019-02-23 | End: 2019-02-23

## 2019-02-23 RX ORDER — DULOXETIN HYDROCHLORIDE 30 MG/1
60 CAPSULE, DELAYED RELEASE ORAL DAILY
Status: DISCONTINUED | OUTPATIENT
Start: 2019-02-23 | End: 2019-02-25 | Stop reason: HOSPADM

## 2019-02-23 RX ORDER — VANCOMYCIN/0.9 % SOD CHLORIDE 1.5G/250ML
1500 PLASTIC BAG, INJECTION (ML) INTRAVENOUS ONCE
Status: COMPLETED | OUTPATIENT
Start: 2019-02-23 | End: 2019-02-23

## 2019-02-23 RX ORDER — SODIUM CHLORIDE 0.9 % (FLUSH) 0.9 %
5-10 SYRINGE (ML) INJECTION AS NEEDED
Status: DISCONTINUED | OUTPATIENT
Start: 2019-02-23 | End: 2019-02-25 | Stop reason: HOSPADM

## 2019-02-23 RX ORDER — SODIUM CHLORIDE 9 MG/ML
50 INJECTION, SOLUTION INTRAVENOUS
Status: COMPLETED | OUTPATIENT
Start: 2019-02-23 | End: 2019-02-23

## 2019-02-23 RX ORDER — PANTOPRAZOLE SODIUM 40 MG/1
40 TABLET, DELAYED RELEASE ORAL DAILY
Status: DISCONTINUED | OUTPATIENT
Start: 2019-02-23 | End: 2019-02-25 | Stop reason: HOSPADM

## 2019-02-23 RX ORDER — CYCLOBENZAPRINE HCL 10 MG
10 TABLET ORAL
Status: DISCONTINUED | OUTPATIENT
Start: 2019-02-23 | End: 2019-02-25 | Stop reason: HOSPADM

## 2019-02-23 RX ORDER — ENOXAPARIN SODIUM 100 MG/ML
40 INJECTION SUBCUTANEOUS EVERY 24 HOURS
Status: DISCONTINUED | OUTPATIENT
Start: 2019-02-23 | End: 2019-02-25 | Stop reason: HOSPADM

## 2019-02-23 RX ORDER — DEXTROSE MONOHYDRATE 25 G/50ML
25-50 INJECTION, SOLUTION INTRAVENOUS AS NEEDED
Status: DISCONTINUED | OUTPATIENT
Start: 2019-02-23 | End: 2019-02-25 | Stop reason: HOSPADM

## 2019-02-23 RX ORDER — LEVOFLOXACIN 5 MG/ML
750 INJECTION, SOLUTION INTRAVENOUS EVERY 24 HOURS
Status: DISCONTINUED | OUTPATIENT
Start: 2019-02-23 | End: 2019-02-23

## 2019-02-23 RX ORDER — LEVETIRACETAM 250 MG/1
250 TABLET ORAL EVERY 12 HOURS
Status: DISCONTINUED | OUTPATIENT
Start: 2019-02-23 | End: 2019-02-25 | Stop reason: HOSPADM

## 2019-02-23 RX ORDER — INSULIN LISPRO 100 [IU]/ML
INJECTION, SOLUTION INTRAVENOUS; SUBCUTANEOUS EVERY 6 HOURS
Status: DISCONTINUED | OUTPATIENT
Start: 2019-02-23 | End: 2019-02-25 | Stop reason: HOSPADM

## 2019-02-23 RX ORDER — POTASSIUM CHLORIDE 7.45 MG/ML
10 INJECTION INTRAVENOUS
Status: DISPENSED | OUTPATIENT
Start: 2019-02-23 | End: 2019-02-23

## 2019-02-23 RX ORDER — SODIUM CHLORIDE 0.9 % (FLUSH) 0.9 %
5-40 SYRINGE (ML) INJECTION AS NEEDED
Status: DISCONTINUED | OUTPATIENT
Start: 2019-02-23 | End: 2019-02-25 | Stop reason: HOSPADM

## 2019-02-23 RX ORDER — FENTANYL 25 UG/1
1 PATCH TRANSDERMAL
Status: DISCONTINUED | OUTPATIENT
Start: 2019-02-23 | End: 2019-02-25 | Stop reason: HOSPADM

## 2019-02-23 RX ORDER — THERA TABS 400 MCG
1 TAB ORAL DAILY
Status: DISCONTINUED | OUTPATIENT
Start: 2019-02-23 | End: 2019-02-25 | Stop reason: HOSPADM

## 2019-02-23 RX ORDER — BISMUTH SUBSALICYLATE 262 MG
1 TABLET,CHEWABLE ORAL DAILY
COMMUNITY

## 2019-02-23 RX ORDER — ACETAMINOPHEN 325 MG/1
650 TABLET ORAL
Status: DISCONTINUED | OUTPATIENT
Start: 2019-02-23 | End: 2019-02-25 | Stop reason: HOSPADM

## 2019-02-23 RX ADMIN — LEVETIRACETAM 250 MG: 250 TABLET ORAL at 10:01

## 2019-02-23 RX ADMIN — POTASSIUM CHLORIDE 10 MEQ: 7.46 INJECTION, SOLUTION INTRAVENOUS at 10:21

## 2019-02-23 RX ADMIN — ENOXAPARIN SODIUM 40 MG: 40 INJECTION SUBCUTANEOUS at 10:01

## 2019-02-23 RX ADMIN — PIPERACILLIN SODIUM,TAZOBACTAM SODIUM 3.38 G: 3; .375 INJECTION, POWDER, FOR SOLUTION INTRAVENOUS at 00:53

## 2019-02-23 RX ADMIN — IOPAMIDOL 75 ML: 755 INJECTION, SOLUTION INTRAVENOUS at 02:25

## 2019-02-23 RX ADMIN — SODIUM CHLORIDE 1000 MG: 900 INJECTION, SOLUTION INTRAVENOUS at 10:02

## 2019-02-23 RX ADMIN — LEVETIRACETAM 250 MG: 250 TABLET ORAL at 22:18

## 2019-02-23 RX ADMIN — PANTOPRAZOLE SODIUM 40 MG: 40 TABLET, DELAYED RELEASE ORAL at 10:01

## 2019-02-23 RX ADMIN — PIPERACILLIN SODIUM,TAZOBACTAM SODIUM 3.38 G: 3; .375 INJECTION, POWDER, FOR SOLUTION INTRAVENOUS at 06:56

## 2019-02-23 RX ADMIN — SODIUM CHLORIDE 1000 MG: 900 INJECTION, SOLUTION INTRAVENOUS at 18:42

## 2019-02-23 RX ADMIN — THERA TABS 1 TABLET: TAB at 10:01

## 2019-02-23 RX ADMIN — Medication 10 ML: at 23:09

## 2019-02-23 RX ADMIN — VANCOMYCIN HYDROCHLORIDE 1500 MG: 10 INJECTION, POWDER, LYOPHILIZED, FOR SOLUTION INTRAVENOUS at 02:37

## 2019-02-23 RX ADMIN — MIRTAZAPINE 7.5 MG: 15 TABLET, FILM COATED ORAL at 22:17

## 2019-02-23 RX ADMIN — PIPERACILLIN SODIUM,TAZOBACTAM SODIUM 3.38 G: 3; .375 INJECTION, POWDER, FOR SOLUTION INTRAVENOUS at 20:59

## 2019-02-23 RX ADMIN — Medication 10 ML: at 14:35

## 2019-02-23 RX ADMIN — SODIUM CHLORIDE 1000 ML: 900 INJECTION, SOLUTION INTRAVENOUS at 01:38

## 2019-02-23 RX ADMIN — PIPERACILLIN SODIUM,TAZOBACTAM SODIUM 3.38 G: 3; .375 INJECTION, POWDER, FOR SOLUTION INTRAVENOUS at 13:57

## 2019-02-23 RX ADMIN — DULOXETINE HYDROCHLORIDE 60 MG: 60 CAPSULE, DELAYED RELEASE ORAL at 11:34

## 2019-02-23 RX ADMIN — SODIUM CHLORIDE 50 ML: 900 INJECTION, SOLUTION INTRAVENOUS at 02:25

## 2019-02-23 RX ADMIN — SODIUM CHLORIDE 1000 ML: 900 INJECTION, SOLUTION INTRAVENOUS at 00:53

## 2019-02-23 RX ADMIN — Medication 10 ML: at 08:00

## 2019-02-23 RX ADMIN — LEVOFLOXACIN 750 MG: 5 INJECTION, SOLUTION INTRAVENOUS at 01:31

## 2019-02-23 NOTE — MANAGEMENT PLAN
Discharge/Transition Planning Interviewed patient. Verified demographics listed on face sheet with patient; all information correct. Pt with [employer] and now has Medicaid. Address listed is fathers. Pt has been at [facility] since beginning of Jan. Pt did live with father, but has advancing dementia and his 2 brothers are caring for father now. Patient stated their PCP is now Dr Farzad Tavarez through [facility]. Previous was Dr Norm Gillette. Patient's NOK is brother, Fady Lofton. Patient dependent with ADLs prior to admission. Quadriplegic and trached. Discharge plan is return to [facility]. Referral sent through [facility] link Patient has designated ____brother____________________ to participate in his/her discharge plan and to receive any needed information. Name: Luis Verdin Phone [phone] Care Management Interventions PCP Verified by CM: Yes(Dr Vila) Mode of Transport at Discharge: BLS Transition of Care Consult (CM Consult): Discharge Planning Current Support Network: [address] [place] [address] Confirm Follow Up Transport: Other (see comment)(S) Plan discussed with Pt/Family/Caregiver: Yes Freedom of Choice Offered: Yes Discharge Location Discharge Placement: [address] Reason for Admission:   Katelin Antony RRAT Score:     13 Do you (patient/family) have any concerns for transition/discharge? Not at time Plan for utilizing home health:     n/a Likelihood of readmission? Listed Moderate Transition of Care Plan:      Back to [facility] Nas Coats RN BSN Outcomes Manager Pager # [phone]

## 2019-02-23 NOTE — ED NOTES
TRANSFER - ED to INPATIENT REPORT: 
 
SBAR report made available to receiving floor on this patient being transferred to [facility] ICU [id number])  for routine progression of care Admitting diagnosis Sepsis ([facility] [address] [A41.9] Information from the following report(s) SBAR, ED Summary, Intake/Output, MAR, Recent Results and Cardiac Rhythm sinus tach was made available to receiving floor. Lines:  
Peripheral IV 02/23/19 Left; Inner; Upper Arm (Active) Site Assessment Clean, dry, & intact 2/23/2019 12:51 AM  
Phlebitis Assessment 0 2/23/2019 12:51 AM  
Infiltration Assessment 0 2/23/2019 12:51 AM  
Dressing Status Clean, dry, & intact 2/23/2019 12:51 AM  
Dressing Type Transparent 2/23/2019 12:51 AM  
Hub Color/Line Status Patent; Flushed 2/23/2019 12:51 AM  
  
 
Medication list unable to confirm Opportunity for questions and clarification was provided. Patient is oriented to time, place, person and situation Sepsis summary fluid bolus completed, inital antibiotics completed. * Patient is  incontinent and non-ambulatory Valuables transported with patient Patient transported with: 
 Monitor O2 @ 10 liters Registered Nurse MAP (Monitor): 74 =Monitored (most recent) Vitals w/ MEWS Score (last day) Date/Time MEWS Score Pulse Resp Temp BP Level of Consciousness SpO2  
 02/23/19 0508  3  107  (Abnormal)   23  98.5 °F (36.9 °C)  112/68  Alert  100 % 02/23/19 0430    105  (Abnormal)   19    108/64    100 % 02/23/19 0415    109  (Abnormal)   19    112/71    100 % 02/23/19 0400    102  (Abnormal)   19    102/63    100 % 02/23/19 0345    105  (Abnormal)   18    108/67    100 % 02/23/19 0330    103  (Abnormal)   18    101/62    100 % 02/23/19 0315    108  (Abnormal)   18    108/69    100 % 02/23/19 0245    103  (Abnormal)   19    108/68    100 % 02/23/19 0230    118  (Abnormal)   21    125/92  (Abnormal)     100 % 02/23/19 0215          118/83     02/23/19 0200    114  (Abnormal)   25    105/67    97 % 02/23/19 0145    119  (Abnormal)   21    94/61    95 % 02/23/19 0130    112  (Abnormal)   24    94/57    98 % 02/23/19 0115    124  (Abnormal)   37  (Abnormal)     98/64    77 %  (Abnormal) 02/23/19 0100    119  (Abnormal)   18    90/58    99 % 02/23/19 0039    122  (Abnormal)   29    84/47  (Abnormal)     87 %  (Abnormal) 02/23/19 0038    123  (Abnormal)   23        87 %  (Abnormal) 02/23/19 0037    124  (Abnormal)   30        86 %  (Abnormal) 02/23/19 0036    127  (Abnormal)   27    90/45    85 %  (Abnormal) 02/23/19 0035    128  (Abnormal)   28    90/34  (Abnormal)     84 %  (Abnormal) 02/23/19 00:33:39  6  131  (Abnormal)   25  99.5 °F (37.5 °C)  90/45  Alert  80 %  (Abnormal) Septic Patients:  
 
Lactic Acid Lab Results Component Value Date LACPOC 1.78 02/23/2019 LACPOC 0.91 02/15/2019  
 (Most recent on top) Repeat drawn: NO Time: Initial lactic acid 1.78 ALL LACTIC ACIDS GREATER THAN 2 MUST BE REPEATED POC WITHIN 4 HOURS OR PRIOR TO ADMISSION Total Fluid Bolus initiated and documented on MAR: YES All ordered antibiotics initiated within first 3 hours of TIME ZERO?   YES

## 2019-02-23 NOTE — PROGRESS NOTES
Transfer note- A & Ox4 noted. Pt denies SOB. Pt is resting comfortably. Transfer pt via bed with x2 assistance & RT. Pt transfer via Room Air. Pt is stable. Report given to Kya Ibrahim RN & made aware that pt is on the floor. Glendy Yeboah RN is aware that meds left at the bedside.

## 2019-02-23 NOTE — ED NOTES
Returned from [facility]. Suctioned patient. Provided with warm blankets. Patient resting in position in comfort.

## 2019-02-23 NOTE — ED NOTES
Bedside and Verbal shift change report given to Carlene Taylor RN (oncoming nurse) by Yarely Salmon RN  (offgoing nurse). Report included the following information ED Summary, MAR and Recent Results.
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
## 2019-02-23 NOTE — PROGRESS NOTES
Patient admitted early this AM by Dr Roberth Hernandez. He has chronic tracheostomy and was admitted with respiratory distress. Discussed with Critical Care, patient is symptomatically improved after mucus plugs coughed out. Transferring to Medical Floor. Patient is indicating that he feels much better.

## 2019-02-23 NOTE — PROGRESS NOTES
Physical Exam  
Skin:  
 
  
  
Primary Nurse Ashleigh Marti RN and Ramakrishna Valdez RN performed a dual skin assessment on this patient Impairment noted- see wound doc flow sheet

## 2019-02-23 NOTE — ED TRIAGE NOTES
Patient is a resident of [facility]. Patient has a tracheostomy and has been requiring increased suctioning and was complaining of feeling like he couldn't breathe despite the suctioning.

## 2019-02-23 NOTE — PROGRESS NOTES
TRANSFER - IN REPORT: 
 
Verbal report received from Mykel RN(name) on Kina Donna  being received from ED(unit) for routine progression of care Report consisted of patients Situation, Background, Assessment and  
Recommendations(SBAR). Information from the following report(s) SBAR, Kardex and ED Summary was reviewed with the receiving nurse. Opportunity for questions and clarification was provided. Assessment completed upon patients arrival to unit and care assumed. 0710 Bedside and Verbal shift change report given to [facility] RN/Fadia RN (oncoming nurse) by Ashleigh RN (offgoing nurse). Report included the following information SBAR and Kardex.

## 2019-02-23 NOTE — ED NOTES
Patient placed back on 10L of oxygen. Patient states he feels better. Oxygen saturations maintaining at 97%

## 2019-02-23 NOTE — PROGRESS NOTES
Precepting Reji Lakhani RN, we will be performing assessments, care and charting in tandem. Please refer to RN notes for full day's event. Helped Renea transfer patient. Pt NAD, stable condition, medications were handed to [employer] on [address] d/t Dallis Headings not being able to come to the patient's room at this time.

## 2019-02-23 NOTE — PROGRESS NOTES
Called for Trach set up for patient coming in from [place]. Patient placed on trach collar of 70% and 10L. SP02 81%. Will suction and see if he improves. SP02 improved to 92% after increase in 02.
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
## 2019-02-23 NOTE — PROGRESS NOTES
Pharmacy Dosing Services: Vancomycin Indication: Pneumonia (HAP) Day of therapy: 1 Other Antimicrobials (Include dose, start day & day of therapy): 
Zosyn 3.375 g IV (Extended Infusion) every 8 hours (Day 1: ) Loading dose (date given): 1500 mg 
Current Maintenance dose: [employer] Goal Vancomycin Level: 15-20 
(Trough 15-20 for most infections, 20 for meningitis/osteomyelitis, pre-HD level ~25) Vancomycin Level (if drawn): Pending Significant Cultures: Pending Renal function stable? (unstable defined as SCr increase of 0.5 mg/dL or > 50% increase from baseline, whichever is greater) (Y/N): Y  
 
CAPD, Hemodialysis or Renal Replacement Therapy (Y/N): N Recent Labs  
  19 
0045 CREA 0.74 BUN 18 WBC 14.2* Temp (24hrs), Av °F (37.2 °C), Min:98.5 °F (36.9 °C), Max:99.5 °F (37.5 °C) Creatinine Clearance (Creatinine Clearance (ml/min)): 113.2 mL/min Regimen assessment: New Start Maintenance dose: 1000 mg IV every 8 hours Next scheduled level:  at 0930 Pharmacy will follow daily and adjust medications as appropriate for renal function and/or serum levels.  
 
Thank you, 
Ashly Damico, PHARMD

## 2019-02-23 NOTE — PROGRESS NOTES
conducted an initial consultation and Spiritual Assessment for Kerry Alberto, who is a 34 y.o.,male. Patients Primary Language is: [place]. According to the patients EMR [employer] Affiliation is: No preference. The reason the Patient came to the hospital is:  
Patient Active Problem List  
 Diagnosis Date Noted  Sepsis ([facility] 02/23/2019  UTI (urinary tract infection) 02/23/2019  DM (diabetes mellitus) ([facility] 02/23/2019  Acute on chronic respiratory failure with hypoxia ([facility] 02/23/2019  GERD (gastroesophageal reflux disease) 02/23/2019  Quadriplegia ([facility] 02/23/2019  Seizures ([facility] 02/23/2019 The  provided the following Interventions: 
Initiated a relationship of care and support. Explored issues of umberto, spirituality and/or Worship needs while hospitalized. Listened empathically. Provided chaplaincy education. Provided information about Spiritual Care Services. Offered prayer and assurance of continued prayers on patient's behalf. Chart reviewed. The following outcomes were achieved: 
Patient shared some information about their medical narrative and spiritual journey/beliefs. Patient processed feeling about current hospitalization. Patient expressed gratitude for the 's visit. Assessment: 
Patient did not indicate any spiritual or Worship issues which require Spiritual Care Services interventions at this time. Patient does not have any Worship/cultural needs that will affect patients preferences in health care. Plan: 
Chaplains will continue to follow and will provide pastoral care on an as needed or requested basis.  recommends bedside caregivers page  on duty if patient shows signs of acute spiritual or emotional distress. [address] [facility] Staff  Spiritual Care  
[phone]

## 2019-02-23 NOTE — PROGRESS NOTES
1330  -- TRANSFER - IN REPORT: 
 
PT being received from ICU  (unit) for routine progression of care Report consisted of patients Situation, Background, Assessment and  
Recommendations(SBAR). Information from the following report(s) SBAR, Procedure Summary, MAR and Recent Results was reviewed with the receiving nurse. Opportunity for questions and clarification was provided. Assessment completed upon patients arrival to unit and care assumed. Cardiac monitor applied, pulse ox applied. Allergy band placed at wrist. Wound care completed on 3 wounds to sacral, dressing applied. Fentanyl patch at left chest, protective tagaderm placed with RN Jalil Maldonado, who assisted with wound care as well. RT call to room to verify all emergency Trach supplies are at bed side. Lora and Colonoscopy care provided. 1530 -- Bedside, Verbal and Written shift change report given to [address] [place] [address] [address] (oncoming nurse) by MARY (offgoing nurse). Report included the following information SBAR, Kardex, Intake/Output, MAR and Recent Results. Skin assessment completed.

## 2019-02-23 NOTE — ED NOTES
Suctioned thick, yellow sputum from patient multiple times. Patient mouths he still can't catch his breath. Oxygen saturations dropped to 77% despite being on 10L and suctioning. Patient being bagged. Dr. Patricia Sears at bedside. Respiratory paged.

## 2019-02-23 NOTE — H&P
Medicine History and Physical 
Patient: Sameer House   Age:  34 y.o. Assessment Principal Problem: 
  Sepsis ([address] (2/23/2019) Active Problems: 
  UTI (urinary tract infection) (2/23/2019) DM (diabetes mellitus) ([address] (2/23/2019) Acute on chronic respiratory failure with hypoxia ([address] (2/23/2019) GERD (gastroesophageal reflux disease) (2/23/2019) Quadriplegia ([address] (2/23/2019) Seizures ([address] (2/23/2019) Plan 1)  Sepsis/ Acute on chronic hypoxic resp failure - Unknown cause at this point, hypoxia would suggest lung but the xray is not impressive. With an immobile patient tachy and hypoxia I think we need to r/o PE 
 - we can continue the abx for now for possible pna, get CTA, PCCM consulting, may need vent  
 - trach care/management - Possible UTI Update: With CTA results no mention of PE however supports significant mucous plugging 2)  DM 
 - SSI 4)  GERD/Seizures 
 - home meds DISPO 
-Pt to be admitted  at this time for reasons addressed above, continued hospitalization for ongoing assessment and treatment indicated Anticipated Date of Discharge: >3 days Anticipated Disposition (home, SNF) : home Chief Complaint:  
Chief Complaint Patient presents with  Shortness of Breath HPI:  
Sameer House is a 34y.o. year old male who presents with SOB that began yesterday. Patient has a trach and lives at [place]. He is usually not on 02, found hypoxic by ED in 70's. Noted a lot of secretions. In the ED he was hypoxic and was Placed on 10 L trach collar his xray was not impressive and looks more mucous plugging then PNA , patient needs to r/o for PE given his risk. Review of Systems - positive responses in bold type Constitutional: Negative for fever, chills, diaphoresis and unexpected weight change. HENT: Negative for ear pain, congestion, sore throat, rhinorrhea, drooling, trouble swallowing, neck pain and tinnitus. Eyes: Negative for photophobia, pain, redness and visual disturbance. Respiratory: negative for shortness of breath, cough, choking, chest tightness, wheezing or stridor. Cardiovascular: Negative for chest pain, palpitations and leg swelling. Gastrointestinal: Negative for nausea, vomiting, abdominal pain, diarrhea, constipation, blood in stool, abdominal distention and anal bleeding. Genitourinary: Negative for dysuria, urgency, frequency, hematuria, flank pain and difficulty urinating. Musculoskeletal: Negative for back pain and arthralgias. Skin: Negative for color change, rash and wound. Neurological: Negative for dizziness, seizures, syncope, speech difficulty, light-headedness or headaches. Hematological: Does not bruise/bleed easily. Psychiatric/Behavioral: Negative for suicidal ideas, hallucinations, behavioral problems, self-injury or agitation Past Medical History: 
Past Medical History:  
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 
History reviewed. No pertinent surgical history. Family History: 
History reviewed. No pertinent family history. Social History: 
Social History Socioeconomic History  Marital status: SINGLE Spouse name: Not on file  Number of children: Not on file  Years of education: Not on file  Highest education level: Not on file Tobacco Use  Smoking status: Never Smoker  Smokeless tobacco: Never Used Substance and Sexual Activity  Alcohol use: No  
  Frequency: Never Home Medications: 
Prior to Admission medications Medication Sig Start Date End Date Taking? Authorizing Provider  
multivitamin (DAILY MULTIPLE) tablet Take 1 Tab by mouth daily. Yes Penelope, MD Jose  
collagenase (SANTYL) 250 unit/gram ointment Apply  to affected area daily. Yes Other, MD Jose  
baclofen (LIORESAL) 20 mg tablet  11/21/18  Yes Other, MD Jose  
buPROPion SR (WELLBUTRIN SR) 150 mg SR tablet  11/7/18  Yes Other, MD Jose  
DULoxetine (CYMBALTA) 60 mg capsule  12/15/18  Yes Other, MD Jose  
fentaNYL (DURAGESIC) 25 mcg/hr [facility]  12/5/18  Yes Other, MD Jose  
gabapentin (NEURONTIN) 600 mg tablet  11/21/18  Yes Other, MD Jose  
glipiZIDE SR (GLUCOTROL XL) 10 mg CR tablet  12/5/18  Yes Other, MD Jose  
cyclobenzaprine (FLEXERIL) 10 mg tablet Take  by mouth three (3) times daily as needed for Muscle Spasm(s). Yes Other, MD Jose  
lactobacillus acidoph-pectin (ACIDOPHILUS-PECTIN) 75 million cell -100 mg cap capsule Take 1 Cap by mouth daily. Yes Penelope, MD Jose  
levETIRAcetam (KEPPRA XR) 500 mg ER tablet Take 500 mg by mouth daily. Yes Penelope, MD Jose  
metFORMIN (GLUCOPHAGE) 500 mg tablet Take  by mouth two (2) times daily (with meals). Yes Penelope, MD Jose  
omeprazole (PRILOSEC) 20 mg capsule Take 20 mg by mouth daily. Yes Penelope, MD Jose  
mirtazapine (REMERON) 7.5 mg tablet Take 7.5 mg by mouth nightly. Yes Other, MD Jose  
amino acids/protein hydrolys (PRO-STAT SUGAR FREE PO) Take  by mouth. Other, MD Jose  
collagenase (SANTYL) 250 unit/gram ointment Apply  to affected area daily. Penelope, MD Jose  
acetaminophen (TYLENOL) 325 mg tablet Take 650 mg by mouth every four (4) hours as needed for Pain. Other, MD Jose  
 
 
Allergies: 
No Known Allergies Physical Exam:  
 
Visit Vitals BP 94/57 Pulse (!) 112 Temp 99.5 °F (37.5 °C) Resp 24 Wt 60.3 kg (133 lb) SpO2 98% Physical Exam: 
General appearance: alert, cooperative, no distress, appears stated age Head: Normocephalic, without obvious abnormality, atraumatic Neck: supple, trachea midline Lungs: clear to auscultation bilaterally Heart: regular rate and rhythm, S1, S2 normal, no murmur, click, rub or gallop Abdomen: soft, non-tender. Bowel sounds normal. No masses,  no organomegaly Extremities: extremities normal, atraumatic, no cyanosis or edema Skin: Skin color, texture, turgor normal. No rashes or lesions Patient is paraplegic, unable to move extremities Intake and Output: 
Current Shift:  No intake/output data recorded. Last three shifts:  No intake/output data recorded. Lab/Data Reviewed: 
CMP:  
Lab Results Component Value Date/Time  02/23/2019 12:45 AM  
 K 3.4 (L) 02/23/2019 12:45 AM  
  02/23/2019 12:45 AM  
 CO2 25 02/23/2019 12:45 AM  
 AGAP 11 02/23/2019 12:45 AM  
  (H) 02/23/2019 12:45 AM  
 BUN 18 02/23/2019 12:45 AM  
 CREA 0.74 02/23/2019 12:45 AM  
 GFRAA >60 02/23/2019 12:45 AM  
 GFRNA >60 02/23/2019 12:45 AM  
 CA 8.6 02/23/2019 12:45 AM  
 ALB 3.1 (L) 02/23/2019 12:45 AM  
 TP 7.6 02/23/2019 12:45 AM  
 GLOB 4.5 (H) 02/23/2019 12:45 AM  
 AGRAT 0.7 (L) 02/23/2019 12:45 AM  
 SGOT 12 (L) 02/23/2019 12:45 AM  
 ALT 15 (L) 02/23/2019 12:45 AM  
 
CBC:  
Lab Results Component Value Date/Time WBC 14.2 (H) 02/23/2019 12:45 AM  
 HGB 10.3 (L) 02/23/2019 12:45 AM  
 HCT 33.2 (L) 02/23/2019 12:45 AM  
  02/23/2019 12:45 AM  
 
All Cardiac Markers in the last 24 hours: No results found for: CPK, CK, CKMMB, CKMB, RCK3, CKMBT, CKNDX, CKND1, TWIN, TROPT, TROIQ, DAYA, TROPT, TNIPOC, BNP, BNPP Chest X-Ray is obtained; CXR reviewed independently -Do not see significant infiltrate , final read pending Anna Ricketts MD 
 
February 23, 2019

## 2019-02-23 NOTE — ED NOTES
Assumed care of patient at this time. Patient resting comfortably on stretcher. Remains on cardiac, BP and SPO2 monitors. IV infusing without s/sx of infiltration/infection. Patient voices no complaints at this time. Awaiting ICU bed assignment.

## 2019-02-23 NOTE — ED NOTES
Purposeful rounding completed: 
 
Side rails up x 2:  YES Bed in low position and wheels locked: YES Call bell within reach: NO 
Comfort addressed: YES Toileting needs addressed: YES Plan of care reviewed/updated with patient and or family members: YES 
IV site assessed: YES Pain assessed and addressed: YES, 0

## 2019-02-23 NOTE — PROGRESS NOTES
1500--Recd pt from unit - size 6 cuffless trach 2 spare size 6's at bedside - no smaller size in stock avail BVM at bedside 
--Pt stable resp status 98% sats, on trach collar with humidity --water bottle good - trach & drain bandage clean 
---administer vest CPT therapy 10 min with no complications --clear BS, good sats 1830--Sputum sample collected, pt suctioned

## 2019-02-24 LAB
ANION GAP SERPL CALC-SCNC: 7 MMOL/L (ref 3–18)
BUN SERPL-MCNC: 7 MG/DL (ref 7–18)
BUN/CREAT SERPL: 14 (ref 12–20)
CALCIUM SERPL-MCNC: 8.1 MG/DL (ref 8.5–10.1)
CHLORIDE SERPL-SCNC: 104 MMOL/L (ref 100–108)
CO2 SERPL-SCNC: 28 MMOL/L (ref 21–32)
CREAT SERPL-MCNC: 0.5 MG/DL (ref 0.6–1.3)
DATE LAST DOSE: ABNORMAL
ERYTHROCYTE [DISTWIDTH] IN BLOOD BY AUTOMATED COUNT: 17.5 % (ref 11.6–14.5)
GLUCOSE BLD STRIP.AUTO-MCNC: 118 MG/DL (ref 70–110)
GLUCOSE BLD STRIP.AUTO-MCNC: 189 MG/DL (ref 70–110)
GLUCOSE BLD STRIP.AUTO-MCNC: 90 MG/DL (ref 70–110)
GLUCOSE BLD STRIP.AUTO-MCNC: 98 MG/DL (ref 70–110)
GLUCOSE SERPL-MCNC: 74 MG/DL (ref 74–99)
HCT VFR BLD AUTO: 30.8 % (ref 36–48)
HGB BLD-MCNC: 9.3 G/DL (ref 13–16)
MCH RBC QN AUTO: 24.6 PG (ref 24–34)
MCHC RBC AUTO-ENTMCNC: 30.2 G/DL (ref 31–37)
MCV RBC AUTO: 81.5 FL (ref 74–97)
PLATELET # BLD AUTO: 331 K/UL (ref 135–420)
PMV BLD AUTO: 9.9 FL (ref 9.2–11.8)
POTASSIUM SERPL-SCNC: 3.4 MMOL/L (ref 3.5–5.5)
RBC # BLD AUTO: 3.78 M/UL (ref 4.7–5.5)
REPORTED DOSE,DOSE: ABNORMAL UNITS
REPORTED DOSE/TIME,TMG: 200
SODIUM SERPL-SCNC: 139 MMOL/L (ref 136–145)
VANCOMYCIN TROUGH SERPL-MCNC: 21.3 UG/ML (ref 10–20)
WBC # BLD AUTO: 8.1 K/UL (ref 4.6–13.2)

## 2019-02-24 PROCEDURE — 77010033678 HC OXYGEN DAILY

## 2019-02-24 PROCEDURE — 36415 COLL VENOUS BLD VENIPUNCTURE: CPT

## 2019-02-24 PROCEDURE — 80202 ASSAY OF VANCOMYCIN: CPT

## 2019-02-24 PROCEDURE — 65270000029 HC RM PRIVATE

## 2019-02-24 PROCEDURE — 74011250637 HC RX REV CODE- 250/637: Performed by: HOSPITALIST

## 2019-02-24 PROCEDURE — 80048 BASIC METABOLIC PNL TOTAL CA: CPT

## 2019-02-24 PROCEDURE — 82962 GLUCOSE BLOOD TEST: CPT

## 2019-02-24 PROCEDURE — 94669 MECHANICAL CHEST WALL OSCILL: CPT

## 2019-02-24 PROCEDURE — 85027 COMPLETE CBC AUTOMATED: CPT

## 2019-02-24 PROCEDURE — 94760 N-INVAS EAR/PLS OXIMETRY 1: CPT

## 2019-02-24 PROCEDURE — 77030020186 HC BOOT HL PROTCT SAGE -B

## 2019-02-24 PROCEDURE — 77030037878 HC DRSG MEPILEX >48IN BORD MOLN -B

## 2019-02-24 PROCEDURE — 74011000258 HC RX REV CODE- 258: Performed by: HOSPITALIST

## 2019-02-24 PROCEDURE — 74011636637 HC RX REV CODE- 636/637: Performed by: HOSPITALIST

## 2019-02-24 PROCEDURE — 74011250636 HC RX REV CODE- 250/636: Performed by: HOSPITALIST

## 2019-02-24 RX ADMIN — THERA TABS 1 TABLET: TAB at 09:52

## 2019-02-24 RX ADMIN — SODIUM CHLORIDE 1000 MG: 900 INJECTION, SOLUTION INTRAVENOUS at 01:13

## 2019-02-24 RX ADMIN — SODIUM CHLORIDE 1000 MG: 900 INJECTION, SOLUTION INTRAVENOUS at 10:15

## 2019-02-24 RX ADMIN — MIRTAZAPINE 7.5 MG: 15 TABLET, FILM COATED ORAL at 21:46

## 2019-02-24 RX ADMIN — DULOXETINE HYDROCHLORIDE 60 MG: 60 CAPSULE, DELAYED RELEASE ORAL at 09:52

## 2019-02-24 RX ADMIN — PANTOPRAZOLE SODIUM 40 MG: 40 TABLET, DELAYED RELEASE ORAL at 09:52

## 2019-02-24 RX ADMIN — Medication 10 ML: at 09:52

## 2019-02-24 RX ADMIN — ENOXAPARIN SODIUM 40 MG: 40 INJECTION SUBCUTANEOUS at 09:52

## 2019-02-24 RX ADMIN — PIPERACILLIN SODIUM,TAZOBACTAM SODIUM 3.38 G: 3; .375 INJECTION, POWDER, FOR SOLUTION INTRAVENOUS at 03:40

## 2019-02-24 RX ADMIN — Medication 10 ML: at 21:46

## 2019-02-24 RX ADMIN — LEVETIRACETAM 250 MG: 250 TABLET ORAL at 09:52

## 2019-02-24 RX ADMIN — PIPERACILLIN SODIUM,TAZOBACTAM SODIUM 3.38 G: 3; .375 INJECTION, POWDER, FOR SOLUTION INTRAVENOUS at 12:32

## 2019-02-24 RX ADMIN — SODIUM CHLORIDE 1250 MG: 900 INJECTION, SOLUTION INTRAVENOUS at 23:38

## 2019-02-24 RX ADMIN — INSULIN LISPRO 2 UNITS: 100 INJECTION, SOLUTION INTRAVENOUS; SUBCUTANEOUS at 12:32

## 2019-02-24 RX ADMIN — LEVETIRACETAM 250 MG: 250 TABLET ORAL at 21:46

## 2019-02-24 RX ADMIN — PIPERACILLIN SODIUM,TAZOBACTAM SODIUM 3.38 G: 3; .375 INJECTION, POWDER, FOR SOLUTION INTRAVENOUS at 19:44

## 2019-02-24 NOTE — PROGRESS NOTES
Nutrition initial assessment/Plan of care RECOMMENDATIONS:  
1. Cardiac Diet ; no concentrated sweets 2. Ensure Enlive daily (strawberry) 3. Monitor labs, weight and PO intake 4. RD to follow GOALS:  
1. PO intake meets >75% of protein/calorie needs by 3/3 
2. Weight Maintenance (+/- 1- 2 lb) by 3/3 ASSESSMENT:  
Wt status is classified as normal per BMI of 20.8. Adequate PO intake per vitals (80-95%). Ensure Enlive daily for additional calories/protien. Labs noted. BG range () over the past 24 hours; A1c (6.0). Nutrition recommendations listed. RD to follow. Nutrition Diagnoses:  
Increased nutrient needs related to wound healing as evidenced by Pt w/ a Stage III sacral/coccyx pressure injury. Nutrition Risk:  [] High  [] Moderate [x]  Low SUBJECTIVE/OBJECTIVE:  
 Pt admitted from [facility] for sepsis. PMHx including a tracheostomy  and quadriplegia. Noted Pt w/ a Stage III sacral/coccyx pressure injury documented by nursing. Pt seen in room later in the afternoon; able to mouth answers. Denies having any food allergies or problems chewing/swallowing and  stable weight; UBW 130s lb. Reports having a good appetite normally and consumes 3 meals and 1 Ensure per day. Discussed importance of viable protein sources with each meal to promote wound healing. Encouraged intake and will monitor. Information Obtained from:  
 [x] Chart Review [x] Patient 
 [] Family/Caregiver 
 [] Nurse/Physician 
 [] Interdisciplinary Meeting/Rounds Diet: Cardiac Diet Medications: [x] Reviewed Remeron Allergies: [x] Reviewed Encounter Diagnoses ICD-10-CM ICD-9-CM 1. Sepsis, due to unspecified organism ([facility] A41.9 038.9  
  995.91  
2. Hypoxia R09.02 799.02  
3. Increased tracheal secretions J39.8 519.19 Past Medical History:  
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   
Lab Results Component Value Date/Time Sodium 139 02/24/2019 04:45 AM  
 Potassium 3.4 (L) 02/24/2019 04:45 AM  
 Chloride 104 02/24/2019 04:45 AM  
 CO2 28 02/24/2019 04:45 AM  
 Anion gap 7 02/24/2019 04:45 AM  
 Glucose 74 02/24/2019 04:45 AM  
 BUN 7 02/24/2019 04:45 AM  
 Creatinine 0.50 (L) 02/24/2019 04:45 AM  
 Calcium 8.1 (L) 02/24/2019 04:45 AM  
 Albumin 3.1 (L) 02/23/2019 12:45 AM  
 
Anthropometrics: BMI (calculated): 20.8 Last 3 Recorded Weights in this Encounter 02/23/19 0033 Weight: 60.3 kg (133 lb) Ht Readings from Last 1 Encounters:  
02/23/19 5' 7\" (1.702 m) Weight Metrics 2/23/2019 2/15/2019 Weight 133 lb 155 lb BMI 20.83 kg/m2 -  
 
 
Patient Vitals for the past 100 hrs: 
 % Diet Eaten 02/24/19 0830 95 % 02/23/19 1755 80 % Adjusted for quadriplegia IBW: 133 lb %IBW: 100% UBW: 130s lb  
[] Weight Loss [] Weight Gain [x] Weight Stable Estimated Nutrition Needs: [x] MSJ  [] Other: 
Calories: 2744-1742 kcal Based on:   [x] Actual BW   
Protein:   73-85 g Based on:   [x] Actual BW Fluid:       7454-5617 ml Based on:   [x] Actual BW  
 
 [x] No Cultural, Baptist or ethnic dietary need identified. [] Cultural, Baptist and ethnic food preferences identified and addressed Wt Status:  [x] Normal (18.6 - 24.9) [] Underweight (<18.5) [] Overweight (25 - 29.9) [] Mild Obesity (30 - 34.9)  [] Moderate Obesity (35 - 39.9) [] Morbid Obesity (40+) Nutrition Problems Identified:  
[] Suboptimal PO intake  
[] Food Allergies [] Difficulty chewing/swallowing/poor dentition 
[] Constipation/Diarrhea  
[] Nausea/Vomiting  
[] None 
[x] Other: wound healing Plan:  
[x] Therapeutic Diet 
[]  Obtained/adjusted food preferences/tolerances and/or snacks options [x]  Supplements added  
[] Occupational therapy following for feeding techniques []  HS snack added  
[]  Modify diet texture  
[]  Modify diet for food allergies []  Educate patient  
[]  Assist with menu selection  
[x]  Monitor PO intake on meal rounds  
[x]  Continue inpatient monitoring and intervention  
[]  Participated in discharge planning/Interdisciplinary rounds/Team meetings  
[]  Other:  
 
Education Needs: 
 [] Not appropriate for teaching at this time due to: 
 [x] Identified and addressed Nutrition Monitoring and Evaluation: 
[x] Continue ongoing monitoring and intervention 
[] Other Halie Ramirez

## 2019-02-24 NOTE — PROGRESS NOTES
Problem: Falls - Risk of 
Goal: *Absence of Falls Document Chau Mclain Fall Risk and appropriate interventions in the flowsheet. Outcome: Progressing Towards Goal 
Fall Risk Interventions: 
  
 
  
 
Medication Interventions: Bed/chair exit alarm Elimination Interventions: Call light in reach, Toileting schedule/hourly rounds

## 2019-02-24 NOTE — PROGRESS NOTES
Problem: Pressure Injury - Risk of 
Goal: *Prevention of pressure injury Document Sarwat Scale and appropriate interventions in the flowsheet. Outcome: Progressing Towards Goal 
Pressure Injury Interventions: 
Sensory Interventions: Keep linens dry and wrinkle-free, Minimize linen layers, Avoid rigorous massage over bony prominences, Check visual cues for pain Moisture Interventions: Internal/External urinary devices, Absorbent underpads, Maintain skin hydration (lotion/cream), Minimize layers Activity Interventions: Assess need for specialty bed, Pressure redistribution bed/mattress(bed type) Mobility Interventions: HOB 30 degrees or less, Pressure redistribution bed/mattress (bed type) Nutrition Interventions: Document food/fluid/supplement intake Friction and Shear Interventions: Lift sheet, HOB 30 degrees or less, Minimize layers Problem: Falls - Risk of 
Goal: *Absence of Falls Document Heather Glez Fall Risk and appropriate interventions in the flowsheet. Outcome: Progressing Towards Goal 
Fall Risk Interventions: 
  
 
  
 
Medication Interventions: Evaluate medications/consider consulting pharmacy Elimination Interventions: Call light in reach, Patient to call for help with toileting needs

## 2019-02-24 NOTE — PROGRESS NOTES
Problem: Impaired Skin Integrity/Pressure Injury Treatment Goal: *Prevention of pressure injury Document Sarwat Scale and appropriate interventions in the flowsheet. Outcome: Progressing Towards Goal 
Pressure Injury Interventions: 
Sensory Interventions: Keep linens dry and wrinkle-free, Assess changes in LOC, Assess need for specialty bed, Monitor skin under medical devices, Turn and reposition approx. every two hours (pillows and wedges if needed), Use 30-degree side-lying position Moisture Interventions: Internal/External urinary devices, Assess need for specialty bed, Check for incontinence Q2 hours and as needed, Apply protective barrier, creams and emollients, Absorbent underpads Activity Interventions: Assess need for specialty bed, Pressure redistribution bed/mattress(bed type) Mobility Interventions: HOB 30 degrees or less, Pressure redistribution bed/mattress (bed type), Assess need for specialty bed, Turn and reposition approx. every two hours(pillow and wedges) Nutrition Interventions: Document food/fluid/supplement intake, Offer support with meals,snacks and hydration Friction and Shear Interventions: HOB 30 degrees or less, Foam dressings/transparent film/skin sealants, Lift sheet

## 2019-02-24 NOTE — PROGRESS NOTES
Progress Note Patient: Guille Sharma               Sex: male          DOA: 2/23/2019 YOB: 1980      Age:  34 y.o.        LOS:  LOS: 1 day CHIEF COMPLAINT:  Acute respiratory distress on top of chronic respiratory failure Subjective:  
 
Patient is awake and alert Chronic tracheostomy Objective:  
  
Visit Vitals /72 Pulse 100 Temp 98.5 °F (36.9 °C) Resp 20 Ht 5' 7\" (1.702 m) Wt 60.3 kg (133 lb) SpO2 98% BMI 20.83 kg/m² Physical Exam: 
Gen:  No distress, no complaint Lungs:  Clear bilaterally, no wheeze or rhonchi Heart:  Regular rate and rhythm, no murmurs or gallops Abdomen:  Soft, non-tender, normal bowel sounds Lab/Data Reviewed: 
BMP:  
Lab Results Component Value Date/Time  02/24/2019 04:45 AM  
 K 3.4 (L) 02/24/2019 04:45 AM  
  02/24/2019 04:45 AM  
 CO2 28 02/24/2019 04:45 AM  
 AGAP 7 02/24/2019 04:45 AM  
 GLU 74 02/24/2019 04:45 AM  
 BUN 7 02/24/2019 04:45 AM  
 CREA 0.50 (L) 02/24/2019 04:45 AM  
 GFRAA >60 02/24/2019 04:45 AM  
 GFRNA >60 02/24/2019 04:45 AM  
 
CBC:  
Lab Results Component Value Date/Time WBC 8.1 02/24/2019 04:45 AM  
 HGB 9.3 (L) 02/24/2019 04:45 AM  
 HCT 30.8 (L) 02/24/2019 04:45 AM  
  02/24/2019 04:45 AM  
 
 
 
 
Assessment/Plan Principal Problem: 
  Sepsis ([address] (2/23/2019) Active Problems: 
  UTI (urinary tract infection) (2/23/2019) Acute on chronic respiratory failure with hypoxia ([address] (2/23/2019) DM (diabetes mellitus) ([address] (2/23/2019) Seizures ([address] (2/23/2019) Quadriplegia ([address] (2/23/2019) GERD (gastroesophageal reflux disease) (2/23/2019) Plan: 
Blood cultures are negative thusfar. Sepsis may rule out Initial respiratory distress likely from mucus plugging which was coughed out Continue antibiotics for now With continued improvement patient could disposition Mon or Tues to SNF.

## 2019-02-24 NOTE — PROGRESS NOTES
2090--Called to bedside for suction - pt being bathed 
--suction --no complications 1034--Trach care completed, clean inner cannula & trach with NS, change drain bandage, water bottle, trach tie RN at bedside for education & assistance Following trach care administer CPT via vest therapy: 10 min --Verified spare trachs & BVM in room, pt has size 6 cuffless in place, shiley with non-disposable inner cannula 
--Water bottle changed, pt remains on humidity, Fio2=28% with 6 lpm flow 
 
--sats 642% - no complications 1345-Spot check on pt - verified he is stable, no resp distress, no suction needed 
sats 100%, 82 HR 
 
1700--Suction pt via trach CPT therapy via vest administered 10 min 1730--Suction via trach post CPT

## 2019-02-24 NOTE — PROGRESS NOTES
2320  Bedside report received from Maria Esther Flaherty RN. Report included the following information SBAR, Kardex and MAR. Alert and oriented X4. 
 
0000  Shift assessment completed. 0415  Pt inh bed resting, no complain at this time. 0600  Pt was suctioned three times in total per pt.'s request, colostomy bag was emptied and all scheduled meds were given.  
 
Bedside report given to Belkis Zaldivar RN

## 2019-02-24 NOTE — ROUTINE PROCESS
Bedside and Verbal shift change report given to ANGEL Garcia (oncoming nurse) by Santy Delarosa (offgoing nurse). Report included the following information SBAR, Kardex and MAR.

## 2019-02-25 VITALS
RESPIRATION RATE: 20 BRPM | BODY MASS INDEX: 22.56 KG/M2 | DIASTOLIC BLOOD PRESSURE: 101 MMHG | SYSTOLIC BLOOD PRESSURE: 149 MMHG | HEART RATE: 87 BPM | WEIGHT: 143.74 LBS | TEMPERATURE: 99 F | HEIGHT: 67 IN | OXYGEN SATURATION: 99 %

## 2019-02-25 LAB
ANION GAP SERPL CALC-SCNC: 10 MMOL/L (ref 3–18)
BUN SERPL-MCNC: 8 MG/DL (ref 7–18)
BUN/CREAT SERPL: 10 (ref 12–20)
CALCIUM SERPL-MCNC: 8.9 MG/DL (ref 8.5–10.1)
CHLORIDE SERPL-SCNC: 106 MMOL/L (ref 100–108)
CO2 SERPL-SCNC: 26 MMOL/L (ref 21–32)
CREAT SERPL-MCNC: 0.77 MG/DL (ref 0.6–1.3)
ERYTHROCYTE [DISTWIDTH] IN BLOOD BY AUTOMATED COUNT: 17.4 % (ref 11.6–14.5)
GLUCOSE BLD STRIP.AUTO-MCNC: 117 MG/DL (ref 70–110)
GLUCOSE BLD STRIP.AUTO-MCNC: 120 MG/DL (ref 70–110)
GLUCOSE BLD STRIP.AUTO-MCNC: 134 MG/DL (ref 70–110)
GLUCOSE BLD STRIP.AUTO-MCNC: 179 MG/DL (ref 70–110)
GLUCOSE SERPL-MCNC: 102 MG/DL (ref 74–99)
HCT VFR BLD AUTO: 25.6 % (ref 36–48)
HGB BLD-MCNC: 9.3 G/DL (ref 13–16)
MCH RBC QN AUTO: 29.5 PG (ref 24–34)
MCHC RBC AUTO-ENTMCNC: 36.3 G/DL (ref 31–37)
MCV RBC AUTO: 81.3 FL (ref 74–97)
PLATELET # BLD AUTO: 383 K/UL (ref 135–420)
PMV BLD AUTO: 10.2 FL (ref 9.2–11.8)
POTASSIUM SERPL-SCNC: 3.7 MMOL/L (ref 3.5–5.5)
RBC # BLD AUTO: 3.15 M/UL (ref 4.7–5.5)
SODIUM SERPL-SCNC: 142 MMOL/L (ref 136–145)
WBC # BLD AUTO: 3.9 K/UL (ref 4.6–13.2)

## 2019-02-25 PROCEDURE — 94760 N-INVAS EAR/PLS OXIMETRY 1: CPT

## 2019-02-25 PROCEDURE — 80048 BASIC METABOLIC PNL TOTAL CA: CPT

## 2019-02-25 PROCEDURE — 74011250636 HC RX REV CODE- 250/636: Performed by: HOSPITALIST

## 2019-02-25 PROCEDURE — 74011000258 HC RX REV CODE- 258: Performed by: HOSPITALIST

## 2019-02-25 PROCEDURE — 82962 GLUCOSE BLOOD TEST: CPT

## 2019-02-25 PROCEDURE — 74011636637 HC RX REV CODE- 636/637: Performed by: HOSPITALIST

## 2019-02-25 PROCEDURE — 85027 COMPLETE CBC AUTOMATED: CPT

## 2019-02-25 PROCEDURE — 74011250637 HC RX REV CODE- 250/637: Performed by: HOSPITALIST

## 2019-02-25 PROCEDURE — 36415 COLL VENOUS BLD VENIPUNCTURE: CPT

## 2019-02-25 PROCEDURE — 77010033678 HC OXYGEN DAILY

## 2019-02-25 PROCEDURE — 77030037878 HC DRSG MEPILEX >48IN BORD MOLN -B

## 2019-02-25 PROCEDURE — 94669 MECHANICAL CHEST WALL OSCILL: CPT

## 2019-02-25 RX ORDER — LEVOFLOXACIN 750 MG/1
750 TABLET ORAL DAILY
Qty: 7 TAB | Refills: 0 | Status: SHIPPED | OUTPATIENT
Start: 2019-02-25 | End: 2019-03-13

## 2019-02-25 RX ADMIN — LEVETIRACETAM 250 MG: 250 TABLET ORAL at 09:05

## 2019-02-25 RX ADMIN — THERA TABS 1 TABLET: TAB at 09:05

## 2019-02-25 RX ADMIN — ENOXAPARIN SODIUM 40 MG: 40 INJECTION SUBCUTANEOUS at 09:05

## 2019-02-25 RX ADMIN — PANTOPRAZOLE SODIUM 40 MG: 40 TABLET, DELAYED RELEASE ORAL at 09:05

## 2019-02-25 RX ADMIN — Medication 10 ML: at 06:07

## 2019-02-25 RX ADMIN — PIPERACILLIN SODIUM,TAZOBACTAM SODIUM 3.38 G: 3; .375 INJECTION, POWDER, FOR SOLUTION INTRAVENOUS at 11:45

## 2019-02-25 RX ADMIN — PIPERACILLIN SODIUM,TAZOBACTAM SODIUM 3.38 G: 3; .375 INJECTION, POWDER, FOR SOLUTION INTRAVENOUS at 03:39

## 2019-02-25 RX ADMIN — INSULIN LISPRO 2 UNITS: 100 INJECTION, SOLUTION INTRAVENOUS; SUBCUTANEOUS at 11:45

## 2019-02-25 RX ADMIN — Medication 20 ML: at 14:00

## 2019-02-25 RX ADMIN — SODIUM CHLORIDE 1250 MG: 900 INJECTION, SOLUTION INTRAVENOUS at 10:50

## 2019-02-25 RX ADMIN — DULOXETINE HYDROCHLORIDE 60 MG: 60 CAPSULE, DELAYED RELEASE ORAL at 09:05

## 2019-02-25 NOTE — PROGRESS NOTES
Problem: Pressure Injury - Risk of 
Goal: *Prevention of pressure injury Document Sarwat Scale and appropriate interventions in the flowsheet. Outcome: Progressing Towards Goal 
Pressure Injury Interventions: 
Sensory Interventions: Maintain/enhance activity level, Keep linens dry and wrinkle-free, Minimize linen layers Moisture Interventions: Internal/External urinary devices, Absorbent underpads Activity Interventions: Assess need for specialty bed, Pressure redistribution bed/mattress(bed type) Mobility Interventions: HOB 30 degrees or less, Pressure redistribution bed/mattress (bed type) Nutrition Interventions: Document food/fluid/supplement intake Friction and Shear Interventions: HOB 30 degrees or less, Foam dressings/transparent film/skin sealants, Lift sheet Problem: Falls - Risk of 
Goal: *Absence of Falls Document Cisco Stinson Fall Risk and appropriate interventions in the flowsheet. Outcome: Progressing Towards Goal 
Fall Risk Interventions: 
  
 
  
 
Medication Interventions: Evaluate medications/consider consulting pharmacy Elimination Interventions: Call light in reach, Patient to call for help with toileting needs Problem: Impaired Skin Integrity/Pressure Injury Treatment Goal: *Prevention of pressure injury Document Sarwat Scale and appropriate interventions in the flowsheet. Outcome: Progressing Towards Goal 
Pressure Injury Interventions: 
Sensory Interventions: Maintain/enhance activity level, Keep linens dry and wrinkle-free, Minimize linen layers Moisture Interventions: Internal/External urinary devices, Absorbent underpads Activity Interventions: Assess need for specialty bed, Pressure redistribution bed/mattress(bed type) Mobility Interventions: HOB 30 degrees or less, Pressure redistribution bed/mattress (bed type) Nutrition Interventions: Document food/fluid/supplement intake Friction and Shear Interventions: HOB 30 degrees or less, Foam dressings/transparent film/skin sealants, Lift sheet

## 2019-02-25 NOTE — PROGRESS NOTES
1100 am; Spoke to Dr Jayashree Aguiar, he stated the patient can return to [facility] this afternoon. Spoke to the patient he agrees with tranistion plan. I have called and left a message for the patients brother as requested by the patient. [facility]. PCS to Nursing unit,report to be called to [phone]. [facility] to transport at 630 pm this evening. Suraj Zavaleta RN Care Management Interventions PCP Verified by CM: Yes 
Palliative Care Criteria Met (RRAT>21 & CHF Dx)?: No 
Mode of Transport at Discharge: ALS Transition of Care Consult (CM Consult): SNF Partner SNF: Yes MyChart Signup: No 
Discharge Durable Medical Equipment: No 
Health Maintenance Reviewed: Yes Physical Therapy Consult: No 
Occupational Therapy Consult: No 
Speech Therapy Consult: No 
Current Support Network: [address] [place] [address] Confirm Follow Up Transport: Other (see comment)(acls, trach collar) Plan discussed with Pt/Family/Caregiver: Yes Freedom of Choice Offered: Yes [employer] Information Provided?: No 
Discharge Location Discharge Placement: [address] Transferred from ICU. Patient has been accepted to [address] [place] [address] when medically stable for discharge.   Suraj Zavaleta RN

## 2019-02-25 NOTE — DISCHARGE INSTRUCTIONS
Patient armband removed and shredded    DISCHARGE SUMMARY from Nurse    PATIENT INSTRUCTIONS:    After general anesthesia or intravenous sedation, for 24 hours or while taking prescription Narcotics:  · Limit your activities  · Do not drive and operate hazardous machinery  · Do not make important personal or business decisions  · Do  not drink alcoholic beverages  · If you have not urinated within 8 hours after discharge, please contact your surgeon on call. Report the following to your surgeon:  · Excessive pain, swelling, redness or odor of or around the surgical area  · Temperature over 100.5  · Nausea and vomiting lasting longer than 4 hours or if unable to take medications  · Any signs of decreased circulation or nerve impairment to extremity: change in color, persistent  numbness, tingling, coldness or increase pain  · Any questions    What to do at Home:  Recommended activity: Activity as tolerated    If you experience any of the following symptoms shortness of breath or chest pain, please follow up with PCP. *  Please give a list of your current medications to your Primary Care Provider. *  Please update this list whenever your medications are discontinued, doses are      changed, or new medications (including over-the-counter products) are added. *  Please carry medication information at all times in case of emergency situations. These are general instructions for a healthy lifestyle:    No smoking/ No tobacco products/ Avoid exposure to second hand smoke  Surgeon General's Warning:  Quitting smoking now greatly reduces serious risk to your health.     Obesity, smoking, and sedentary lifestyle greatly increases your risk for illness    A healthy diet, regular physical exercise & weight monitoring are important for maintaining a healthy lifestyle    You may be retaining fluid if you have a history of heart failure or if you experience any of the following symptoms:  Weight gain of 3 pounds or more overnight or 5 pounds in a week, increased swelling in our hands or feet or shortness of breath while lying flat in bed. Please call your doctor as soon as you notice any of these symptoms; do not wait until your next office visit. Recognize signs and symptoms of STROKE:    F-face looks uneven    A-arms unable to move or move unevenly    S-speech slurred or non-existent    T-time-call 911 as soon as signs and symptoms begin-DO NOT go       Back to bed or wait to see if you get better-TIME IS BRAIN. Warning Signs of HEART ATTACK     Call 911 if you have these symptoms:   Chest discomfort. Most heart attacks involve discomfort in the center of the chest that lasts more than a few minutes, or that goes away and comes back. It can feel like uncomfortable pressure, squeezing, fullness, or pain.  Discomfort in other areas of the upper body. Symptoms can include pain or discomfort in one or both arms, the back, neck, jaw, or stomach.  Shortness of breath with or without chest discomfort.  Other signs may include breaking out in a cold sweat, nausea, or lightheadedness. Don't wait more than five minutes to call 911 - MINUTES MATTER! Fast action can save your life. Calling 911 is almost always the fastest way to get lifesaving treatment. Emergency Medical Services staff can begin treatment when they arrive -- up to an hour sooner than if someone gets to the hospital by car. The discharge information has been reviewed with the patient. The patient verbalized understanding. Discharge medications reviewed with the patient and appropriate educational materials and side effects teaching were provided.   ___________________________________________________________________________________________________________________________________

## 2019-02-25 NOTE — PROGRESS NOTES
6851--Check on pt for resp stability - RN in room - discussed plan of care for trach, suction 
--verified humidity in place 
--suction pt - thick, secretions 
--Dr. Perry Rowe also in room with me - discussed with MD plan of care for pt - continue on humidity, suction, Chest PT throughout day 
 
--Pt continues on size 6 shiley, cuffless, inner cannula is non-disposable 
--spares in room at bedside, & BVM 
 
1445-CPT via vest therapy - Wound care arrived at end of therapy - remained at bedside with would care to assist with suction due to frequent moving & vest therapy just completed 
--suction pt via trach & oral 
--clean inner cannula, clean trach, change drain bandage --change water bottle 1500--Remained at bedside during wound care due to pt need for movement & suctioning - suction several times during procedure - no complications 1825--Check back for RT assessment on pt - confirmed still scheduled for transport back to facility - RN at bedside & confirmed pt just suctioned - confirmed with pt breathing good

## 2019-02-25 NOTE — ROUTINE PROCESS
Bedside and Verbal shift change report given to Radha RN (oncoming nurse) by Ryann Montano RN 
 (offgoing nurse). Report included the following information SBAR, Kardex, Intake/Output, MAR and Cardiac Rhythm SR-ST.

## 2019-02-25 NOTE — WOUND CARE
Wound/Ostomy Nurse Progress Note Patient: Leonie Atkinson ZHB:8/57/1802 MRN: [id number] Situation: Wound consult for wounds on buttocks and sacrum, and ostomy assessment. Background: Patient was admitted to [facility] on 2/23/19 for pneumonia. He is paraplegic and has a tracheostomy in place. Assessment: Patient was lying in bed receiving respiratory care when wound care arrived for consult. He was awake and alert, but has some difficulty speaking. He is quadriplegic. His respiratory therapist agreed to stand-by in case the patient needed suctioning during the wound consult. His ostomy appliance was assessed first. The wafer had come away from his skin, leaking a large effluent on the patient and his bedding. The appliance was removed and the patient was cleaned. His bedding was changed. A new appliance was fitted. His stoma is prolapsed but otherwise there are no concerns. His jude stomal skin is intact. Skin prep was used and his wafer and pouch was applied. The patient was turned on his side in order to assess his buttocks and sacrum. He has multiple open stage 3 ulcers on his ischium, sacrum, and perineum. They are all red, without s/s of infection. The measurements are as follows: 
 
Right ischium: 4.8 x 3.7 x 0.2 cm. Right ischium medial: 4.6 x 1.9 x 0.2 cm Left ischium: 3.3 x 2.1 x 0.2 cm. Perineum: 1 x 2.1 x 0.2 Sacrum: 1.2 x 3.8 x0.2 cm. The wounds were cleansed and a new dressing of Mepilex foam was applied to all wounds. Recommendation: Patient is being discharged today. His orders for wound care are to continue with Mepilex dressing every other day. Specialty bed is recommended.

## 2019-02-25 NOTE — DISCHARGE SUMMARY
Internal Medicine Discharge Summary        Patient: Christal Mahoney    YOB: 1980    Age:  34 y.o. Admit Date: 2/23/2019    Discharge Date: 2/25/2019    LOS:  LOS: 2 days     Discharge To: SNF    Consults: Pulmonary/Critical Care    Admission Diagnoses: Sepsis [facility]) [A41.9]    Discharge Diagnoses:    Problem List as of 2/25/2019 Never Reviewed          Codes Class Noted - Resolved    * (Principal) Sepsis ([facility] ICD-10-CM: A41.9  ICD-9-CM: 038.9, 995.91  2/23/2019 - Present        UTI (urinary tract infection) ICD-10-CM: N39.0  ICD-9-CM: 599.0  2/23/2019 - Present        DM (diabetes mellitus) ([facility] ICD-10-CM: E11.9  ICD-9-CM: 250.00  2/23/2019 - Present        Acute on chronic respiratory failure with hypoxia ([facility] ICD-10-CM: J96.21  ICD-9-CM: 518.84, 799.02  2/23/2019 - Present        GERD (gastroesophageal reflux disease) ICD-10-CM: K21.9  ICD-9-CM: 530.81  2/23/2019 - Present        Quadriplegia ([facility] ICD-10-CM: G82.50  ICD-9-CM: 344.00  2/23/2019 - Present        Seizures ([facility] ICD-10-CM: R56.9  ICD-9-CM: 780.39  2/23/2019 - Present              Discharge Condition:  Improved         HPI:   Christal Mahoney is a 34y.o. year old male who presents with SOB that began yesterday. Patient has a trach and lives at [place]. He is usually not on 02, found hypoxic by ED in 70's. Noted a lot of secretions. In the ED he was hypoxic and was Placed on 10 L trach collar his xray was not impressive and looks more mucous plugging then PNA , patient needs to r/o for PE given his risk.       Hospital Course:  CTA chest was negative for PE but did show possible pneumonia. Pt was evaluated by Pulmonary/critical care medicine. Their assessment was he had acute respiratory distress with hypoxia secondary to mucous plugging. Pt was able to expectorate mucus plug and significantly improved. He was treated with IV vancomycin and zosyn. He was started on metanebs. Blood cultures were negative.  Sputum culture shows rare gram negative rods. He will discharge on Levaquin 7 day course to complete treatment for pneumonia. The rest of the patient's chronic conditions were managed appropriately during their admission. They were medically stable at the time of discharge. Visit Vitals  BP (!) 150/106 (BP 1 Location: Left arm, BP Patient Position: At rest)   Pulse 84   Temp 98.3 °F (36.8 °C)   Resp 18   Ht 5' 7\" (1.702 m)   Wt 65.2 kg (143 lb 11.8 oz)   SpO2 100%   BMI 22.51 kg/m²       Physical Exam at Discharge:  General Appearance: NAD, conversant  HENT: normocephalic/atraumatic, moist mucus membranes; trach in place   Lungs: CTA with normal respiratory effort  CV: RRR, no m/r/g  Abdomen: soft, non-tender, normal bowel sounds  Extremities: no cyanosis, no peripheral edema  Neuro: moves all extremities, no focal deficits  Psych: appropriate affect, alert and oriented to person, place and time    Labs Prior to Discharge:  Labs: Results:       Chemistry Recent Labs     02/25/19 0425 02/24/19 0445 02/23/19 0720 02/23/19 0045   * 74 99 109*    139 141 138   K 3.7 3.4* 3.8 3.4*    104 110* 102   CO2 26 28 26 25   BUN 8 7 13 18   CREA 0.77 0.50* 0.55* 0.74   CA 8.9 8.1* 7.5* 8.6   AGAP 10 7 5 11   BUCR 10* 14 24* 24*   AP  --   --   --  122*   TP  --   --   --  7.6   ALB  --   --   --  3.1*   GLOB  --   --   --  4.5*   AGRAT  --   --   --  0.7*      CBC w/Diff Recent Labs     02/25/19 0425 02/24/19 0445 02/23/19 0045   WBC 3.9* 8.1 14.2*   RBC 3.15* 3.78* 4.10*   HGB 9.3* 9.3* 10.3*   HCT 25.6* 30.8* 33.2*    331 336   GRANS  --   --  78*   LYMPH  --   --  10*   EOS  --   --  5      Cardiac Enzymes No results for input(s): CPK, CKND1, TWIN in the last 72 hours. No lab exists for component: CKRMB, TROIP   Coagulation No results for input(s): PTP, INR, APTT in the last 72 hours.     No lab exists for component: INREXT    Lipid Panel No results found for: CHOL, CHOLPOCT, CHOLX, CHLST, CHOLV, [id number], HDL, LDL, LDLC, DLDLP, 466721, VLDLC, VLDL, TGLX, TRIGL, TRIGP, TGLPOCT, CHHD, CHHDX   BNP No results for input(s): BNPP in the last 72 hours. Liver Enzymes Recent Labs     02/23/19  0045   TP 7.6   ALB 3.1*   *   SGOT 12*      Thyroid Studies No results found for: T4, T3U, TSH, TSHEXT         Significant Imaging:  Cta Chest W Or W Wo Cont    Result Date: 2/23/2019  EXAM: CTA Chest INDICATION: Shortness of breath, cough, evaluation for pulmonary embolism. Sepsis. COMPARISON: No prior study. TECHNIQUE: Axial CT imaging from the thoracic inlet through the diaphragm with intravenous contrast utilizing CTA study for pulmonary artery evaluation. Coronal and sagittal MIP reformations were generated at a separate workstation. One or more dose reduction techniques were used on this CT: automated exposure control, adjustment of the mAs and/or kVp according to patient size, and iterative reconstruction techniques. The specific techniques used on this CT exam have been documented in the patient's electronic medical record. Digital Imaging and Communications in Medicine (DICOM) format image data are available to nonaffiliated external healthcare facilities or entities on a secure, media free, reciprocally searchable basis with patient authorization for at least a 12-month period after this study. _______________ FINDINGS: EXAM QUALITY: Overall exam quality is adequate. Pulmonary arterial enhancement is adequate. The breath hold is satisfactory. PULMONARY ARTERIES: No convincing evidence of pulmonary embolism. MEDIASTINUM: Included portions of the thyroid gland demonstrate no focal abnormality. Cardiac size is normal. Thoracic aorta normal in course and caliber. No pericardial effusion. LYMPH NODES: No enlarged mediastinal or hilar nodes by size criteria. AIRWAY: Tracheostomy catheter present within the trachea.  Diffuse bronchial wall thickening noted with endobronchial secretions noted throughout the left mainstem bronchus and left lower lobe bronchi. LUNGS: Patchy peribronchial opacities noted throughout the left lower lobe with volume loss demonstrated. Additional linear areas of opacity noted at the lung bases bilaterally. No suspicious pulmonary nodule or mass. Accessory fissure for the right lower lobe noted. PLEURA: No pleural effusion or pneumothorax. UPPER ABDOMEN: Visualized upper abdomen is unremarkable. . OTHER: No acute or aggressive osseous abnormalities identified. _______________     IMPRESSION: 1. No evidence of pulmonary embolism. 2.  Tracheostomy catheter in expected position. 3. Considerable bronchial wall thickening and endobronchial secretions as above. Patchy peribronchial opacities throughout the left lower lobe may reflect a superimposed element of pneumonia, with overall volume loss in the left lower lobe suggesting some associated atelectasis. No pleural effusion. Note: Preliminary report sent to the Emergency Department by the radiology resident at the time of the study. Xr Chest Port    Result Date: 2/23/2019  Chest, single view Indication: Shortness of breath and sepsis Comparison: 2/15/2019 Findings:  Portable upright AP view of the chest was obtained. Tracheostomy catheter projects in stable position. Mild interval increase in left retrocardiac opacity. No pneumothorax or pleural effusion. Stable cardiac size and mediastinal contours. No acute osseous abnormality. Impression: 1. Tracheostomy catheter in stable position. 2. Mild interval increase in left retrocardiac density potential atelectasis or airspace disease. Xr Chest Port    Result Date: 2/15/2019  Chest, single view Indication: Sepsis Comparison: 2/14/2019 Findings:  Portable upright AP view of the chest was obtained. There is a tracheostomy catheter which projects over the thoracic inlet in stable position. Faint linear opacity at the left lung base unchanged.  No focal pneumonic consolidation, pneumothorax, or pleural effusion. Cardiac size and mediastinal contours remain within normal limits. No acute osseous abnormality. Impression: 1. Tracheostomy catheter in stable position. 2. Faint linear area of atelectasis or scarring at the left lung base, unchanged without superimposed acute radiographic abnormality. Xr Chest Port    Result Date: 2/15/2019  EXAM: Portable frontal view of the chest. CLINICAL INDICATION/HISTORY: Difficulty suctioning tracheostomy, shortness of breath COMPARISON: _______________ FINDINGS: Tracheostomy tube is in satisfactory position. Normal mediastinal and cardiac silhouettes. Mild streaky density medial and lateral left lung base. Remainder of lungs clear. No pleural effusion. No significant bony finding. _______________     IMPRESSION: 1. Mild areas of atelectasis or scarring left base. 2. No other evidence of active cardiopulmonary disease. Discharge Medications:     Current Discharge Medication List      START taking these medications    Details   levoFLOXacin (LEVAQUIN) 750 mg tablet Take 1 Tab by mouth daily for 7 days. Qty: 7 Tab, Refills: 0         CONTINUE these medications which have NOT CHANGED    Details   multivitamin (DAILY MULTIPLE) tablet Take 1 Tab by mouth daily. !! collagenase (SANTYL) 250 unit/gram ointment Apply  to affected area daily. baclofen (LIORESAL) 20 mg tablet       buPROPion SR (WELLBUTRIN SR) 150 mg SR tablet       DULoxetine (CYMBALTA) 60 mg capsule       fentaNYL (DURAGESIC) 25 mcg/hr PATCH       gabapentin (NEURONTIN) 600 mg tablet       glipiZIDE SR (GLUCOTROL XL) 10 mg CR tablet       cyclobenzaprine (FLEXERIL) 10 mg tablet Take  by mouth three (3) times daily as needed for Muscle Spasm(s). lactobacillus acidoph-pectin (ACIDOPHILUS-PECTIN) 75 million cell -100 mg cap capsule Take 1 Cap by mouth daily. levETIRAcetam (KEPPRA XR) 500 mg ER tablet Take 500 mg by mouth daily.       metFORMIN (GLUCOPHAGE) 500 mg tablet Take  by mouth two (2) times daily (with meals). omeprazole (PRILOSEC) 20 mg capsule Take 20 mg by mouth daily. mirtazapine (REMERON) 7.5 mg tablet Take 7.5 mg by mouth nightly. amino acids/protein hydrolys (PRO-STAT SUGAR FREE PO) Take  by mouth. !! collagenase (SANTYL) 250 unit/gram ointment Apply  to affected area daily. acetaminophen (TYLENOL) 325 mg tablet Take 650 mg by mouth every four (4) hours as needed for Pain. !! - Potential duplicate medications found. Please discuss with provider. Activity: Activity as tolerated    Diet: Regular Diet    Follow-up:   Please follow up with your PCP within 7 days to discuss your recent hospitalization. Patient to arrange.          Total time spent including time spent on final examination and discharge discussion, discharge documentation and records reviewed and medication reconciliation: > 30 minutes    Hudson Garcia MD  2/25/2019  2:30 PM

## 2019-02-25 NOTE — PROGRESS NOTES
Transportation at Discharge: 2/25/19 Transport Company/Representative: Janie Bhagat / [employer] Phone number: [phone] Method of Transport: Omi Wood / Chris Gandara Estimated pick-up time: 3:30P Destination: [facility] Insurance Info: Medicare / Carlos Long Authorization: No auth required Requesting Outcomes Manager: Sri Montes De Oca, Care- ext [phone]

## 2019-02-25 NOTE — ROUTINE PROCESS
0710 Bedside, Verbal and Written shift change report given to [address] [place] [address] (oncoming nurse) by Luca Dai RN (offgoing nurse). Report included the following information SBAR and Kardex. Pt resting in bed, denies complaints, call bell in reach, trach #6 Shiley cuffless,  
 
0900 suctioned patient 3x without difficulty 1030 suctioned 3x without difficulty 1530 wound care team at bedside, wound care completed Ostomy bag changed, wafer changed 1618 Verbal report called to [facility], nurse at [facility]. TRANSFER - OUT REPORT: 
 
Verbal report given to Irvin (name) on Todd Goodson  being transferred to [facility](unit) for routine progression of care Report consisted of patients Situation, Background, Assessment and  
Recommendations(SBAR). Information from the following report(s) SBAR, Kardex, Intake/Output, MAR, Recent Results and Cardiac Rhythm sinus tachycardia was reviewed with the receiving nurse. Lines:  
Peripheral IV 02/23/19 Left; Inner; Upper Arm (Active) Site Assessment Clean, dry, & intact 2/25/2019  5:30 AM  
Phlebitis Assessment 0 2/25/2019  5:30 AM  
Infiltration Assessment 0 2/25/2019  5:30 AM  
Dressing Status Clean, dry, & intact 2/25/2019  5:30 AM  
Dressing Type Transparent;Tape 2/25/2019  5:30 AM  
Hub Color/Line Status Infusing 2/25/2019  5:30 AM  
Action Taken Open ports on tubing capped 2/24/2019  8:00 AM  
Alcohol Cap Used Yes 2/25/2019  5:30 AM  
   
Peripheral IV 02/25/19 Right Hand (Active) Site Assessment Clean, dry, & intact 2/25/2019  5:30 AM  
Phlebitis Assessment 0 2/25/2019  5:30 AM  
Infiltration Assessment 0 2/25/2019  5:30 AM  
Dressing Status Clean, dry, & intact 2/25/2019  5:30 AM  
Dressing Type Transparent;Tape 2/25/2019  5:30 AM  
Hub Color/Line Status Flushed;Capped 2/25/2019  5:30 AM  
Alcohol Cap Used Yes 2/25/2019  5:30 AM  
  
 
Opportunity for questions and clarification was provided. Patient transported with: 
 O2 @ 8 liters 1815 IVs removed without difficulty, minimal bleeding, I have reviewed discharge instructions with the patient. The patient verbalized understanding. Pt to be picked up at [address] [facility] by transport to [facility]. Denies pain Patient Vitals for the past 4 hrs: 
 Temp Pulse Resp BP SpO2  
02/25/19 1835 99 °F (37.2 °C) 87 20 (!) 149/101 99 % 1830 Suctioned x3, tolerated well 1925 suctioned x3, tolerated well 
 
1930 [facility] transport arrived, connected patient to portable oxygen from [facility], pt taken off unit

## 2019-02-25 NOTE — PROGRESS NOTES
Bedside shift change report given to Rdaha RN by Mary Rucker RN. Report included the following information SBAR, Kardex, Intake/Output, MAR and Cardiac Rhythm NSR/Sinus Tach. 1945  In bed resting, refused to be turned/repositioned at this time. Denies pain. 2300  Attempted to insert PIV - unsuccessful. Notified Supervisor. 2320  ICU nurse at bedside, PIV insertion unsuccessful. States she would go get a vein finder to insert a new PIV. 
 
0010  New PIV inserted by Lizzeth Loving RN. Pt denies pain, watching TV. 
 
0530  Denies pain. 0610   - Humalog held. 0745  Bedside report given to Carolee Barnes RN by Kayla Turner.ANGEL.

## 2019-02-26 ENCOUNTER — APPOINTMENT (OUTPATIENT)
Dept: GENERAL RADIOLOGY | Age: 39
DRG: 870 | End: 2019-02-26
Attending: EMERGENCY MEDICINE
Payer: MEDICARE

## 2019-02-26 ENCOUNTER — HOSPITAL ENCOUNTER (INPATIENT)
Age: 39
LOS: 12 days | Discharge: NURSING, REHAB OR LONG-TERM CARE | DRG: 870 | End: 2019-03-13
Attending: EMERGENCY MEDICINE | Admitting: HOSPITALIST
Payer: MEDICARE

## 2019-02-26 DIAGNOSIS — A41.9 SEPSIS, DUE TO UNSPECIFIED ORGANISM: Primary | ICD-10-CM

## 2019-02-26 DIAGNOSIS — I95.9 HYPOTENSION, UNSPECIFIED HYPOTENSION TYPE: ICD-10-CM

## 2019-02-26 DIAGNOSIS — G82.50 QUADRIPLEGIA (HCC): ICD-10-CM

## 2019-02-26 LAB — LACTATE BLD-SCNC: 2.11 MMOL/L (ref 0.4–2)

## 2019-02-26 PROCEDURE — 71045 X-RAY EXAM CHEST 1 VIEW: CPT

## 2019-02-26 PROCEDURE — 87106 FUNGI IDENTIFICATION YEAST: CPT

## 2019-02-26 PROCEDURE — 82550 ASSAY OF CK (CPK): CPT

## 2019-02-26 PROCEDURE — 93005 ELECTROCARDIOGRAM TRACING: CPT

## 2019-02-26 PROCEDURE — 87040 BLOOD CULTURE FOR BACTERIA: CPT

## 2019-02-26 PROCEDURE — 99285 EMERGENCY DEPT VISIT HI MDM: CPT

## 2019-02-26 PROCEDURE — 81003 URINALYSIS AUTO W/O SCOPE: CPT

## 2019-02-26 PROCEDURE — 80053 COMPREHEN METABOLIC PANEL: CPT

## 2019-02-26 PROCEDURE — 85025 COMPLETE CBC W/AUTO DIFF WBC: CPT

## 2019-02-26 PROCEDURE — 74011250636 HC RX REV CODE- 250/636: Performed by: EMERGENCY MEDICINE

## 2019-02-26 PROCEDURE — 51702 INSERT TEMP BLADDER CATH: CPT

## 2019-02-26 PROCEDURE — 94762 N-INVAS EAR/PLS OXIMTRY CONT: CPT

## 2019-02-26 PROCEDURE — 87086 URINE CULTURE/COLONY COUNT: CPT

## 2019-02-26 PROCEDURE — 96361 HYDRATE IV INFUSION ADD-ON: CPT

## 2019-02-26 PROCEDURE — 83605 ASSAY OF LACTIC ACID: CPT

## 2019-02-26 PROCEDURE — 83735 ASSAY OF MAGNESIUM: CPT

## 2019-02-26 RX ORDER — VANCOMYCIN/0.9 % SOD CHLORIDE 1.5G/250ML
1500 PLASTIC BAG, INJECTION (ML) INTRAVENOUS ONCE
Status: DISCONTINUED | OUTPATIENT
Start: 2019-02-27 | End: 2019-02-27

## 2019-02-26 RX ORDER — MAGNESIUM SULFATE HEPTAHYDRATE 40 MG/ML
2 INJECTION, SOLUTION INTRAVENOUS ONCE
Status: COMPLETED | OUTPATIENT
Start: 2019-02-26 | End: 2019-02-27

## 2019-02-26 RX ORDER — LEVOFLOXACIN 5 MG/ML
750 INJECTION, SOLUTION INTRAVENOUS EVERY 24 HOURS
Status: DISCONTINUED | OUTPATIENT
Start: 2019-02-27 | End: 2019-02-27 | Stop reason: ALTCHOICE

## 2019-02-26 RX ORDER — SODIUM CHLORIDE 0.9 % (FLUSH) 0.9 %
5-10 SYRINGE (ML) INJECTION AS NEEDED
Status: DISCONTINUED | OUTPATIENT
Start: 2019-02-26 | End: 2019-03-13 | Stop reason: HOSPADM

## 2019-02-26 RX ADMIN — SODIUM CHLORIDE 1000 ML: 900 INJECTION, SOLUTION INTRAVENOUS at 23:58

## 2019-02-26 RX ADMIN — SODIUM CHLORIDE 1000 ML: 900 INJECTION, SOLUTION INTRAVENOUS at 23:59

## 2019-02-27 ENCOUNTER — APPOINTMENT (OUTPATIENT)
Dept: NON INVASIVE DIAGNOSTICS | Age: 39
DRG: 870 | End: 2019-02-27
Attending: FAMILY MEDICINE
Payer: MEDICARE

## 2019-02-27 ENCOUNTER — APPOINTMENT (OUTPATIENT)
Dept: GENERAL RADIOLOGY | Age: 39
DRG: 870 | End: 2019-02-27
Attending: NURSE PRACTITIONER
Payer: MEDICARE

## 2019-02-27 ENCOUNTER — APPOINTMENT (OUTPATIENT)
Dept: CT IMAGING | Age: 39
DRG: 870 | End: 2019-02-27
Attending: FAMILY MEDICINE
Payer: MEDICARE

## 2019-02-27 PROBLEM — I95.9 HYPOTENSION: Status: ACTIVE | Noted: 2019-02-27

## 2019-02-27 PROBLEM — R41.89 UNRESPONSIVE EPISODE: Status: ACTIVE | Noted: 2019-02-27

## 2019-02-27 LAB
ALBUMIN SERPL-MCNC: 2.8 G/DL (ref 3.4–5)
ALBUMIN/GLOB SERPL: 0.7 {RATIO} (ref 0.8–1.7)
ALP SERPL-CCNC: 99 U/L (ref 45–117)
ALT SERPL-CCNC: 16 U/L (ref 16–61)
ANION GAP SERPL CALC-SCNC: 10 MMOL/L (ref 3–18)
ANION GAP SERPL CALC-SCNC: 7 MMOL/L (ref 3–18)
APPEARANCE UR: CLEAR
ARTERIAL PATENCY WRIST A: ABNORMAL
AST SERPL-CCNC: 14 U/L (ref 15–37)
ATRIAL RATE: 89 BPM
BASE DEFICIT BLD-SCNC: 2 MMOL/L
BASOPHILS # BLD: 0 K/UL (ref 0–0.1)
BASOPHILS # BLD: 0 K/UL (ref 0–0.1)
BASOPHILS NFR BLD: 0 % (ref 0–2)
BASOPHILS NFR BLD: 0 % (ref 0–2)
BDY SITE: ABNORMAL
BILIRUB SERPL-MCNC: 0.2 MG/DL (ref 0.2–1)
BILIRUB UR QL: NEGATIVE
BODY TEMPERATURE: 97.3
BUN SERPL-MCNC: 16 MG/DL (ref 7–18)
BUN SERPL-MCNC: 9 MG/DL (ref 7–18)
BUN/CREAT SERPL: 11 (ref 12–20)
BUN/CREAT SERPL: 13 (ref 12–20)
CA-I SERPL-SCNC: 1.08 MMOL/L (ref 1.12–1.32)
CALCIUM SERPL-MCNC: 7.5 MG/DL (ref 8.5–10.1)
CALCIUM SERPL-MCNC: 7.7 MG/DL (ref 8.5–10.1)
CALCULATED P AXIS, ECG09: 58 DEGREES
CALCULATED R AXIS, ECG10: 58 DEGREES
CALCULATED T AXIS, ECG11: 67 DEGREES
CHLORIDE SERPL-SCNC: 111 MMOL/L (ref 100–108)
CHLORIDE SERPL-SCNC: 94 MMOL/L (ref 100–108)
CK MB CFR SERPL CALC: 1.5 % (ref 0–4)
CK MB SERPL-MCNC: 1.7 NG/ML (ref 5–25)
CK SERPL-CCNC: 111 U/L (ref 39–308)
CO2 SERPL-SCNC: 27 MMOL/L (ref 21–32)
CO2 SERPL-SCNC: 28 MMOL/L (ref 21–32)
COLOR UR: YELLOW
CREAT SERPL-MCNC: 0.84 MG/DL (ref 0.6–1.3)
CREAT SERPL-MCNC: 1.26 MG/DL (ref 0.6–1.3)
DIAGNOSIS, 93000: NORMAL
DIFFERENTIAL METHOD BLD: ABNORMAL
DIFFERENTIAL METHOD BLD: ABNORMAL
EOSINOPHIL # BLD: 0.1 K/UL (ref 0–0.4)
EOSINOPHIL # BLD: 0.2 K/UL (ref 0–0.4)
EOSINOPHIL NFR BLD: 1 % (ref 0–5)
EOSINOPHIL NFR BLD: 2 % (ref 0–5)
ERYTHROCYTE [DISTWIDTH] IN BLOOD BY AUTOMATED COUNT: 16.8 % (ref 11.6–14.5)
ERYTHROCYTE [DISTWIDTH] IN BLOOD BY AUTOMATED COUNT: 17.3 % (ref 11.6–14.5)
GAS FLOW.O2 O2 DELIVERY SYS: ABNORMAL L/MIN
GLOBULIN SER CALC-MCNC: 3.8 G/DL (ref 2–4)
GLUCOSE BLD STRIP.AUTO-MCNC: 122 MG/DL (ref 70–110)
GLUCOSE BLD STRIP.AUTO-MCNC: 163 MG/DL (ref 70–110)
GLUCOSE BLD STRIP.AUTO-MCNC: 86 MG/DL (ref 70–110)
GLUCOSE BLD STRIP.AUTO-MCNC: 92 MG/DL (ref 70–110)
GLUCOSE SERPL-MCNC: 110 MG/DL (ref 74–99)
GLUCOSE SERPL-MCNC: 96 MG/DL (ref 74–99)
GLUCOSE UR STRIP.AUTO-MCNC: NEGATIVE MG/DL
HCO3 BLD-SCNC: 24.3 MMOL/L (ref 22–26)
HCT VFR BLD AUTO: 29.4 % (ref 36–48)
HCT VFR BLD AUTO: 30.3 % (ref 36–48)
HGB BLD-MCNC: 9.1 G/DL (ref 13–16)
HGB BLD-MCNC: 9.2 G/DL (ref 13–16)
HGB UR QL STRIP: NEGATIVE
KETONES UR QL STRIP.AUTO: 40 MG/DL
LACTATE BLD-SCNC: 1.2 MMOL/L (ref 0.4–2)
LEUKOCYTE ESTERASE UR QL STRIP.AUTO: NEGATIVE
LYMPHOCYTES # BLD: 0.9 K/UL (ref 0.9–3.6)
LYMPHOCYTES # BLD: 1 K/UL (ref 0.9–3.6)
LYMPHOCYTES NFR BLD: 10 % (ref 21–52)
LYMPHOCYTES NFR BLD: 9 % (ref 21–52)
MAGNESIUM SERPL-MCNC: 1.6 MG/DL (ref 1.6–2.6)
MAGNESIUM SERPL-MCNC: 2.2 MG/DL (ref 1.6–2.6)
MCH RBC QN AUTO: 24.7 PG (ref 24–34)
MCH RBC QN AUTO: 25 PG (ref 24–34)
MCHC RBC AUTO-ENTMCNC: 30 G/DL (ref 31–37)
MCHC RBC AUTO-ENTMCNC: 31.3 G/DL (ref 31–37)
MCV RBC AUTO: 79.9 FL (ref 74–97)
MCV RBC AUTO: 82.3 FL (ref 74–97)
MONOCYTES # BLD: 0.9 K/UL (ref 0.05–1.2)
MONOCYTES # BLD: 1.1 K/UL (ref 0.05–1.2)
MONOCYTES NFR BLD: 12 % (ref 3–10)
MONOCYTES NFR BLD: 9 % (ref 3–10)
NEUTS SEG # BLD: 7.4 K/UL (ref 1.8–8)
NEUTS SEG # BLD: 7.6 K/UL (ref 1.8–8)
NEUTS SEG NFR BLD: 78 % (ref 40–73)
NEUTS SEG NFR BLD: 79 % (ref 40–73)
NITRITE UR QL STRIP.AUTO: NEGATIVE
O2/TOTAL GAS SETTING VFR VENT: 0.5 %
P-R INTERVAL, ECG05: 134 MS
PCO2 BLD: 47.9 MMHG (ref 35–45)
PH BLD: 7.31 [PH] (ref 7.35–7.45)
PH UR STRIP: 7.5 [PH] (ref 5–8)
PHOSPHATE SERPL-MCNC: 2.6 MG/DL (ref 2.5–4.9)
PLATELET # BLD AUTO: 287 K/UL (ref 135–420)
PLATELET # BLD AUTO: 316 K/UL (ref 135–420)
PMV BLD AUTO: 9.3 FL (ref 9.2–11.8)
PMV BLD AUTO: 9.4 FL (ref 9.2–11.8)
PO2 BLD: 90 MMHG (ref 80–100)
POTASSIUM SERPL-SCNC: 3.1 MMOL/L (ref 3.5–5.5)
POTASSIUM SERPL-SCNC: 3.6 MMOL/L (ref 3.5–5.5)
PROT SERPL-MCNC: 6.6 G/DL (ref 6.4–8.2)
PROT UR STRIP-MCNC: NEGATIVE MG/DL
Q-T INTERVAL, ECG07: 494 MS
QRS DURATION, ECG06: 90 MS
QTC CALCULATION (BEZET), ECG08: 601 MS
RBC # BLD AUTO: 3.68 M/UL (ref 4.7–5.5)
RBC # BLD AUTO: 3.68 M/UL (ref 4.7–5.5)
SAO2 % BLD: 96 % (ref 92–97)
SERVICE CMNT-IMP: ABNORMAL
SODIUM SERPL-SCNC: 132 MMOL/L (ref 136–145)
SODIUM SERPL-SCNC: 145 MMOL/L (ref 136–145)
SP GR UR REFRACTOMETRY: 1.01 (ref 1–1.03)
SPECIMEN TYPE: ABNORMAL
TOTAL RESP. RATE, ITRR: 13
TROPONIN I SERPL-MCNC: <0.02 NG/ML (ref 0–0.04)
UROBILINOGEN UR QL STRIP.AUTO: 0.2 EU/DL (ref 0.2–1)
VENTRICULAR RATE, ECG03: 89 BPM
WBC # BLD AUTO: 9.5 K/UL (ref 4.6–13.2)
WBC # BLD AUTO: 9.7 K/UL (ref 4.6–13.2)

## 2019-02-27 PROCEDURE — 92610 EVALUATE SWALLOWING FUNCTION: CPT

## 2019-02-27 PROCEDURE — 77030010545

## 2019-02-27 PROCEDURE — 82962 GLUCOSE BLOOD TEST: CPT

## 2019-02-27 PROCEDURE — 74011250636 HC RX REV CODE- 250/636: Performed by: EMERGENCY MEDICINE

## 2019-02-27 PROCEDURE — 83735 ASSAY OF MAGNESIUM: CPT

## 2019-02-27 PROCEDURE — 74011250637 HC RX REV CODE- 250/637: Performed by: FAMILY MEDICINE

## 2019-02-27 PROCEDURE — 77030018836 HC SOL IRR NACL ICUM -A

## 2019-02-27 PROCEDURE — 74011000258 HC RX REV CODE- 258: Performed by: EMERGENCY MEDICINE

## 2019-02-27 PROCEDURE — 36600 WITHDRAWAL OF ARTERIAL BLOOD: CPT

## 2019-02-27 PROCEDURE — 94664 DEMO&/EVAL PT USE INHALER: CPT

## 2019-02-27 PROCEDURE — 87186 SC STD MICRODIL/AGAR DIL: CPT

## 2019-02-27 PROCEDURE — 94640 AIRWAY INHALATION TREATMENT: CPT

## 2019-02-27 PROCEDURE — 96375 TX/PRO/DX INJ NEW DRUG ADDON: CPT

## 2019-02-27 PROCEDURE — 74011636637 HC RX REV CODE- 636/637: Performed by: FAMILY MEDICINE

## 2019-02-27 PROCEDURE — 77010033678 HC OXYGEN DAILY

## 2019-02-27 PROCEDURE — 74011250636 HC RX REV CODE- 250/636: Performed by: FAMILY MEDICINE

## 2019-02-27 PROCEDURE — 74011250636 HC RX REV CODE- 250/636: Performed by: NURSE PRACTITIONER

## 2019-02-27 PROCEDURE — 74011250636 HC RX REV CODE- 250/636

## 2019-02-27 PROCEDURE — 96366 THER/PROPH/DIAG IV INF ADDON: CPT

## 2019-02-27 PROCEDURE — 74011250637 HC RX REV CODE- 250/637: Performed by: HOSPITALIST

## 2019-02-27 PROCEDURE — 71045 X-RAY EXAM CHEST 1 VIEW: CPT

## 2019-02-27 PROCEDURE — 71275 CT ANGIOGRAPHY CHEST: CPT

## 2019-02-27 PROCEDURE — P9047 ALBUMIN (HUMAN), 25%, 50ML: HCPCS | Performed by: NURSE PRACTITIONER

## 2019-02-27 PROCEDURE — 87070 CULTURE OTHR SPECIMN AEROBIC: CPT

## 2019-02-27 PROCEDURE — 74011250636 HC RX REV CODE- 250/636: Performed by: HOSPITALIST

## 2019-02-27 PROCEDURE — 74011000258 HC RX REV CODE- 258: Performed by: HOSPITALIST

## 2019-02-27 PROCEDURE — 87077 CULTURE AEROBIC IDENTIFY: CPT

## 2019-02-27 PROCEDURE — 94002 VENT MGMT INPAT INIT DAY: CPT

## 2019-02-27 PROCEDURE — 36415 COLL VENOUS BLD VENIPUNCTURE: CPT

## 2019-02-27 PROCEDURE — 74011000250 HC RX REV CODE- 250: Performed by: FAMILY MEDICINE

## 2019-02-27 PROCEDURE — 84100 ASSAY OF PHOSPHORUS: CPT

## 2019-02-27 PROCEDURE — 74011636320 HC RX REV CODE- 636/320: Performed by: EMERGENCY MEDICINE

## 2019-02-27 PROCEDURE — 94669 MECHANICAL CHEST WALL OSCILL: CPT

## 2019-02-27 PROCEDURE — 99218 HC RM OBSERVATION: CPT

## 2019-02-27 PROCEDURE — 96361 HYDRATE IV INFUSION ADD-ON: CPT

## 2019-02-27 PROCEDURE — 82803 BLOOD GASES ANY COMBINATION: CPT

## 2019-02-27 PROCEDURE — 83605 ASSAY OF LACTIC ACID: CPT

## 2019-02-27 PROCEDURE — 82330 ASSAY OF CALCIUM: CPT

## 2019-02-27 PROCEDURE — 93306 TTE W/DOPPLER COMPLETE: CPT

## 2019-02-27 PROCEDURE — 96365 THER/PROPH/DIAG IV INF INIT: CPT

## 2019-02-27 PROCEDURE — 92526 ORAL FUNCTION THERAPY: CPT

## 2019-02-27 RX ORDER — SODIUM CHLORIDE, SODIUM LACTATE, POTASSIUM CHLORIDE, CALCIUM CHLORIDE 600; 310; 30; 20 MG/100ML; MG/100ML; MG/100ML; MG/100ML
125 INJECTION, SOLUTION INTRAVENOUS CONTINUOUS
Status: DISCONTINUED | OUTPATIENT
Start: 2019-02-27 | End: 2019-02-27

## 2019-02-27 RX ORDER — GABAPENTIN 300 MG/1
600 CAPSULE ORAL 3 TIMES DAILY
Status: DISCONTINUED | OUTPATIENT
Start: 2019-02-27 | End: 2019-03-13 | Stop reason: HOSPADM

## 2019-02-27 RX ORDER — VANCOMYCIN/0.9 % SOD CHLORIDE 1.5G/250ML
1500 PLASTIC BAG, INJECTION (ML) INTRAVENOUS ONCE
Status: COMPLETED | OUTPATIENT
Start: 2019-02-27 | End: 2019-02-27

## 2019-02-27 RX ORDER — MIDODRINE HYDROCHLORIDE 10 MG/1
10 TABLET ORAL
Status: DISCONTINUED | OUTPATIENT
Start: 2019-02-27 | End: 2019-02-28

## 2019-02-27 RX ORDER — FENTANYL 25 UG/1
1 PATCH TRANSDERMAL
Status: DISCONTINUED | OUTPATIENT
Start: 2019-02-27 | End: 2019-03-13 | Stop reason: HOSPADM

## 2019-02-27 RX ORDER — LEVETIRACETAM 500 MG/1
500 TABLET, EXTENDED RELEASE ORAL DAILY
Status: DISCONTINUED | OUTPATIENT
Start: 2019-02-27 | End: 2019-03-13 | Stop reason: HOSPADM

## 2019-02-27 RX ORDER — BUPROPION HYDROCHLORIDE 150 MG/1
150 TABLET, EXTENDED RELEASE ORAL DAILY
Status: DISCONTINUED | OUTPATIENT
Start: 2019-02-27 | End: 2019-03-13 | Stop reason: HOSPADM

## 2019-02-27 RX ORDER — SODIUM CHLORIDE, SODIUM LACTATE, POTASSIUM CHLORIDE, CALCIUM CHLORIDE 600; 310; 30; 20 MG/100ML; MG/100ML; MG/100ML; MG/100ML
100 INJECTION, SOLUTION INTRAVENOUS CONTINUOUS
Status: DISCONTINUED | OUTPATIENT
Start: 2019-02-27 | End: 2019-02-27

## 2019-02-27 RX ORDER — ACETAMINOPHEN 325 MG/1
650 TABLET ORAL
Status: DISCONTINUED | OUTPATIENT
Start: 2019-02-27 | End: 2019-03-13 | Stop reason: HOSPADM

## 2019-02-27 RX ORDER — EPINEPHRINE 0.1 MG/ML
1 INJECTION INTRACARDIAC; INTRAVENOUS
Status: COMPLETED | OUTPATIENT
Start: 2019-02-27 | End: 2019-02-27

## 2019-02-27 RX ORDER — DULOXETIN HYDROCHLORIDE 30 MG/1
60 CAPSULE, DELAYED RELEASE ORAL DAILY
Status: DISCONTINUED | OUTPATIENT
Start: 2019-02-27 | End: 2019-03-13 | Stop reason: HOSPADM

## 2019-02-27 RX ORDER — MIRTAZAPINE 15 MG/1
7.5 TABLET, FILM COATED ORAL
Status: DISCONTINUED | OUTPATIENT
Start: 2019-02-27 | End: 2019-03-13 | Stop reason: HOSPADM

## 2019-02-27 RX ORDER — SODIUM CHLORIDE 9 MG/ML
100 INJECTION, SOLUTION INTRAVENOUS ONCE
Status: COMPLETED | OUTPATIENT
Start: 2019-02-27 | End: 2019-02-27

## 2019-02-27 RX ORDER — FENTANYL CITRATE 50 UG/ML
50 INJECTION, SOLUTION INTRAMUSCULAR; INTRAVENOUS ONCE
Status: COMPLETED | OUTPATIENT
Start: 2019-02-27 | End: 2019-02-27

## 2019-02-27 RX ORDER — BACLOFEN 10 MG/1
10 TABLET ORAL 2 TIMES DAILY
Status: DISCONTINUED | OUTPATIENT
Start: 2019-02-27 | End: 2019-03-13 | Stop reason: HOSPADM

## 2019-02-27 RX ORDER — EPINEPHRINE 0.1 MG/ML
0.02 INJECTION INTRACARDIAC; INTRAVENOUS
Status: DISCONTINUED | OUTPATIENT
Start: 2019-02-27 | End: 2019-02-27

## 2019-02-27 RX ORDER — ALBUMIN HUMAN 250 G/1000ML
12.5 SOLUTION INTRAVENOUS ONCE
Status: COMPLETED | OUTPATIENT
Start: 2019-02-27 | End: 2019-02-27

## 2019-02-27 RX ORDER — ONDANSETRON 2 MG/ML
4 INJECTION INTRAMUSCULAR; INTRAVENOUS
Status: DISCONTINUED | OUTPATIENT
Start: 2019-02-27 | End: 2019-03-13 | Stop reason: HOSPADM

## 2019-02-27 RX ORDER — EPINEPHRINE 0.1 MG/ML
INJECTION INTRACARDIAC; INTRAVENOUS
Status: COMPLETED
Start: 2019-02-27 | End: 2019-02-27

## 2019-02-27 RX ORDER — UREA 10 %
1 LOTION (ML) TOPICAL DAILY
Status: DISCONTINUED | OUTPATIENT
Start: 2019-02-27 | End: 2019-03-13 | Stop reason: HOSPADM

## 2019-02-27 RX ORDER — POTASSIUM CHLORIDE 750 MG/1
40 TABLET, FILM COATED, EXTENDED RELEASE ORAL ONCE
Status: COMPLETED | OUTPATIENT
Start: 2019-02-27 | End: 2019-02-27

## 2019-02-27 RX ORDER — METOPROLOL TARTRATE 5 MG/5ML
2.5 INJECTION INTRAVENOUS ONCE
Status: DISCONTINUED | OUTPATIENT
Start: 2019-02-27 | End: 2019-02-27

## 2019-02-27 RX ORDER — BISMUTH SUBSALICYLATE 262 MG
1 TABLET,CHEWABLE ORAL DAILY
Status: DISCONTINUED | OUTPATIENT
Start: 2019-02-27 | End: 2019-03-13 | Stop reason: HOSPADM

## 2019-02-27 RX ORDER — INSULIN LISPRO 100 [IU]/ML
INJECTION, SOLUTION INTRAVENOUS; SUBCUTANEOUS
Status: DISCONTINUED | OUTPATIENT
Start: 2019-02-27 | End: 2019-03-13 | Stop reason: HOSPADM

## 2019-02-27 RX ORDER — IPRATROPIUM BROMIDE AND ALBUTEROL SULFATE 2.5; .5 MG/3ML; MG/3ML
3 SOLUTION RESPIRATORY (INHALATION)
Status: DISPENSED | OUTPATIENT
Start: 2019-02-27 | End: 2019-03-02

## 2019-02-27 RX ORDER — DEXTROSE MONOHYDRATE 25 G/50ML
25-50 INJECTION, SOLUTION INTRAVENOUS AS NEEDED
Status: DISCONTINUED | OUTPATIENT
Start: 2019-02-27 | End: 2019-03-13 | Stop reason: HOSPADM

## 2019-02-27 RX ORDER — MAGNESIUM SULFATE 100 %
4 CRYSTALS MISCELLANEOUS AS NEEDED
Status: DISCONTINUED | OUTPATIENT
Start: 2019-02-27 | End: 2019-03-13 | Stop reason: HOSPADM

## 2019-02-27 RX ORDER — ACETYLCYSTEINE 100 MG/ML
4 SOLUTION ORAL; RESPIRATORY (INHALATION)
Status: DISPENSED | OUTPATIENT
Start: 2019-02-27 | End: 2019-03-02

## 2019-02-27 RX ORDER — SULFAMETHOXAZOLE AND TRIMETHOPRIM 400; 80 MG/1; MG/1
1 TABLET ORAL EVERY 12 HOURS
Status: COMPLETED | OUTPATIENT
Start: 2019-02-27 | End: 2019-03-06

## 2019-02-27 RX ORDER — PANTOPRAZOLE SODIUM 20 MG/1
20 TABLET, DELAYED RELEASE ORAL DAILY
Status: DISCONTINUED | OUTPATIENT
Start: 2019-02-27 | End: 2019-03-13 | Stop reason: HOSPADM

## 2019-02-27 RX ORDER — SODIUM CHLORIDE AND POTASSIUM CHLORIDE .9; .15 G/100ML; G/100ML
SOLUTION INTRAVENOUS CONTINUOUS
Status: DISPENSED | OUTPATIENT
Start: 2019-02-27 | End: 2019-02-28

## 2019-02-27 RX ADMIN — FENTANYL CITRATE 50 MCG: 50 INJECTION, SOLUTION INTRAMUSCULAR; INTRAVENOUS at 06:12

## 2019-02-27 RX ADMIN — SODIUM CHLORIDE AND POTASSIUM CHLORIDE: 9; 1.49 INJECTION, SOLUTION INTRAVENOUS at 01:29

## 2019-02-27 RX ADMIN — IOPAMIDOL 60 ML: 755 INJECTION, SOLUTION INTRAVENOUS at 05:16

## 2019-02-27 RX ADMIN — EPINEPHRINE 0.1 MG: 0.1 INJECTION, SOLUTION ENDOTRACHEAL; INTRACARDIAC; INTRAVENOUS at 04:18

## 2019-02-27 RX ADMIN — GABAPENTIN 600 MG: 300 CAPSULE ORAL at 22:25

## 2019-02-27 RX ADMIN — MIDODRINE HYDROCHLORIDE 10 MG: 10 TABLET ORAL at 12:37

## 2019-02-27 RX ADMIN — MULTIVITAMIN TABLET 1 TABLET: TABLET at 09:00

## 2019-02-27 RX ADMIN — IPRATROPIUM BROMIDE AND ALBUTEROL SULFATE 3 ML: .5; 3 SOLUTION RESPIRATORY (INHALATION) at 23:45

## 2019-02-27 RX ADMIN — MIDODRINE HYDROCHLORIDE 10 MG: 10 TABLET ORAL at 17:34

## 2019-02-27 RX ADMIN — IPRATROPIUM BROMIDE AND ALBUTEROL SULFATE 3 ML: .5; 3 SOLUTION RESPIRATORY (INHALATION) at 06:02

## 2019-02-27 RX ADMIN — MAGNESIUM SULFATE HEPTAHYDRATE 2 G: 40 INJECTION, SOLUTION INTRAVENOUS at 00:13

## 2019-02-27 RX ADMIN — ACETYLCYSTEINE 400 MG: 100 SOLUTION ORAL; RESPIRATORY (INHALATION) at 23:45

## 2019-02-27 RX ADMIN — MIRTAZAPINE 7.5 MG: 15 TABLET, FILM COATED ORAL at 22:25

## 2019-02-27 RX ADMIN — BACLOFEN 10 MG: 10 TABLET ORAL at 17:34

## 2019-02-27 RX ADMIN — BACLOFEN 10 MG: 10 TABLET ORAL at 09:28

## 2019-02-27 RX ADMIN — EPINEPHRINE 0.1 MG: 0.1 INJECTION, SOLUTION ENDOTRACHEAL; INTRACARDIAC; INTRAVENOUS at 05:00

## 2019-02-27 RX ADMIN — SODIUM CHLORIDE 100 ML/HR: 900 INJECTION, SOLUTION INTRAVENOUS at 05:17

## 2019-02-27 RX ADMIN — GABAPENTIN 600 MG: 300 CAPSULE ORAL at 09:35

## 2019-02-27 RX ADMIN — SODIUM CHLORIDE AND POTASSIUM CHLORIDE: 9; 1.49 INJECTION, SOLUTION INTRAVENOUS at 22:00

## 2019-02-27 RX ADMIN — GABAPENTIN 600 MG: 300 CAPSULE ORAL at 16:02

## 2019-02-27 RX ADMIN — POTASSIUM CHLORIDE 40 MEQ: 750 TABLET, EXTENDED RELEASE ORAL at 09:28

## 2019-02-27 RX ADMIN — ALBUMIN (HUMAN) 12.5 G: 0.25 INJECTION, SOLUTION INTRAVENOUS at 09:27

## 2019-02-27 RX ADMIN — SODIUM CHLORIDE 1000 ML: 900 INJECTION, SOLUTION INTRAVENOUS at 00:16

## 2019-02-27 RX ADMIN — LEVOFLOXACIN 750 MG: 5 INJECTION, SOLUTION INTRAVENOUS at 12:31

## 2019-02-27 RX ADMIN — INSULIN LISPRO 2 UNITS: 100 INJECTION, SOLUTION INTRAVENOUS; SUBCUTANEOUS at 17:33

## 2019-02-27 RX ADMIN — SODIUM CHLORIDE AND POTASSIUM CHLORIDE: 9; 1.49 INJECTION, SOLUTION INTRAVENOUS at 09:24

## 2019-02-27 RX ADMIN — VANCOMYCIN HYDROCHLORIDE 1500 MG: 10 INJECTION, POWDER, LYOPHILIZED, FOR SOLUTION INTRAVENOUS at 01:00

## 2019-02-27 RX ADMIN — MIDODRINE HYDROCHLORIDE 10 MG: 10 TABLET ORAL at 09:28

## 2019-02-27 RX ADMIN — PIPERACILLIN SODIUM,TAZOBACTAM SODIUM 4.5 G: 4; .5 INJECTION, POWDER, FOR SOLUTION INTRAVENOUS at 12:31

## 2019-02-27 RX ADMIN — MULTIVITAMIN TABLET 1 TABLET: TABLET at 09:28

## 2019-02-27 RX ADMIN — GABAPENTIN 600 MG: 300 CAPSULE ORAL at 09:28

## 2019-02-27 RX ADMIN — PANTOPRAZOLE SODIUM 20 MG: 20 TABLET, DELAYED RELEASE ORAL at 09:28

## 2019-02-27 RX ADMIN — SODIUM CHLORIDE 1000 MG: 900 INJECTION, SOLUTION INTRAVENOUS at 16:02

## 2019-02-27 RX ADMIN — PIPERACILLIN SODIUM,TAZOBACTAM SODIUM 4.5 G: 4; .5 INJECTION, POWDER, FOR SOLUTION INTRAVENOUS at 06:32

## 2019-02-27 RX ADMIN — PIPERACILLIN SODIUM,TAZOBACTAM SODIUM 4.5 G: 4; .5 INJECTION, POWDER, FOR SOLUTION INTRAVENOUS at 00:09

## 2019-02-27 RX ADMIN — PIPERACILLIN SODIUM,TAZOBACTAM SODIUM 4.5 G: 4; .5 INJECTION, POWDER, FOR SOLUTION INTRAVENOUS at 18:18

## 2019-02-27 RX ADMIN — IPRATROPIUM BROMIDE AND ALBUTEROL SULFATE 3 ML: .5; 3 SOLUTION RESPIRATORY (INHALATION) at 20:26

## 2019-02-27 NOTE — ED NOTES
Condom cath is from [facility].  Will call consulate at this time to verify whether patient received dose of levaquin today at [facility] prior to starting an additional dose in ER

## 2019-02-27 NOTE — ED NOTES
Spoke to Drea Franklin (title unknown) told her that we are currently going to keep the patient b/c of oxygen saturation issues. Will update floor with further details.

## 2019-02-27 NOTE — ED NOTES
Dr. Luis Felipe Nunez wants CTA chest ordered. MD at bedside to perform US guided PIV to gain appropriate access for study due to patient being a difficult stick.

## 2019-02-27 NOTE — ED PROVIDER NOTES
Anibal Choi is a 34 y.o. Male with h/o trach who was just d/c yesterday from this facility for sepsis, uti who was noted to have ams per nh. Upon ems arrival pt was noted to be hypotensive, alert to voice. Fluids started in route The history is provided by the EMS personnel and medical records. The history is limited by the condition of the patient. Past Medical History:  
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 No pertinent surgical history. History reviewed. No pertinent family history. Social History Socioeconomic History  Marital status: SINGLE Spouse name: Not on file  Number of children: Not on file  Years of education: Not on file  Highest education level: Not on file Social Needs  Financial resource strain: Not on file  Food insecurity - worry: Not on file  Food insecurity - inability: Not on file  Transportation needs - medical: Not on file  Transportation needs - non-medical: Not on file Occupational History  Not on file Tobacco Use  Smoking status: Never Smoker  Smokeless tobacco: Never Used Substance and Sexual Activity  Alcohol use: No  
  Frequency: Never  Drug use: Not on file  Sexual activity: Not on file Other Topics Concern  Not on file Social History Narrative  Not on file ALLERGIES: Heparin Review of Systems Unable to perform ROS: Mental status change Vitals: 02/27/19 0300 02/27/19 0315 02/27/19 0324 02/27/19 0330 BP: (!) 87/57 97/69 97/69 (!) 73/47 Pulse: 90 94 90 89 Resp: 15 18 18 26 Temp:   96.6 °F (35.9 °C) SpO2: 96% 96% 95% (!) 79% Weight:      
Height:      
      
 
Physical Exam  
Constitutional: He appears well-developed and well-nourished. He appears toxic. He has a sickly appearance. He appears ill. He appears distressed. HENT:  
Head: Normocephalic and atraumatic. Right Ear: External ear normal.  
Left Ear: External ear normal.  
Nose: Nose normal.  
Mouth/Throat: Oropharynx is clear and moist. No oropharyngeal exudate. Eyes: Conjunctivae and EOM are normal. Pupils are equal, round, and reactive to light. Neck: Normal range of motion. Trach in place with no sig d/c. No bleeding Cardiovascular: Normal rate, regular rhythm, normal heart sounds and intact distal pulses. Pulmonary/Chest: Effort normal and breath sounds normal. No respiratory distress. Abdominal: Soft. There is no tenderness. Genitourinary:  
Genitourinary Comments: Condom cath on with urine in tube. No gross hematuria Musculoskeletal: Normal range of motion. He exhibits no edema. Neurological: He is alert. Awakes to voice. Pt is paraplegic Skin: Skin is warm and dry. Capillary refill takes less than 2 seconds. He is not diaphoretic. Psychiatric: His behavior is normal.  
Nursing note and vitals reviewed. MDM Procedures Vitals: 
Patient Vitals for the past 12 hrs: 
 Temp Pulse Resp BP SpO2  
02/27/19 0330  89 26 (!) 73/47 (!) 79 % 02/27/19 0324 96.6 °F (35.9 °C) 90 18 97/69 95 % 02/27/19 0315  94 18 97/69 96 % 02/27/19 0300  90 15 (!) 87/57 96 % 02/27/19 0245  89 21 93/58 97 % 02/27/19 0230  80 14 105/65 98 % 02/27/19 0223  80 13 118/74 96 % 02/27/19 0215  84   96 % 02/27/19 0200  92   97 % 02/27/19 0145  89   97 % 02/27/19 0130  83  (!) 88/55 97 % 02/27/19 0115  84  (!) 87/54 97 % 02/27/19 0100  80  113/77   
02/27/19 0045  85  101/61 99 % 02/27/19 0030  78  (!) 74/40 96 % 02/27/19 0024     96 % 02/27/19 0015  90  (!) 88/54 97 % 02/27/19 0000  96   95 % 02/26/19 2349  98  99/55 91 % 02/26/19 2345  (!) 102   93 % 02/26/19 2332 97.7 °F (36.5 °C) 89 18 (!) 74/47 97 % 02/26/19 2330  89  (!) 74/47 97 % Medications ordered:  
Medications  
sodium chloride (NS) flush 5-10 mL (not administered)  
piperacillin-tazobactam (ZOSYN) 4.5 g in 0.9% sodium chloride (MBP/ADV) 100 mL MBP (4.5 g IntraVENous New Bag 2/27/19 0009) levoFLOXacin (LEVAQUIN) 750 mg in D5W IVPB (not administered)  
vancomycin (VANCOCIN) 1,000 mg in 0.9% sodium chloride (MBP/ADV) 250 mL adv (not administered) VANCOMYCIN INFORMATION NOTE (not administered) VANCOMYCIN INFORMATION NOTE 1 Each (not administered) 0.9% sodium chloride with KCl 20 mEq/L infusion ( IntraVENous Continued On Admission 2/27/19 0200) midodrine (PROAMITINE) tablet 10 mg (not administered)  
sodium chloride 0.9 % bolus infusion 1,000 mL (1,000 mL IntraVENous New Bag 2/26/19 2358) Followed by  
sodium chloride 0.9 % bolus infusion 1,000 mL (1,000 mL IntraVENous New Bag 2/27/19 0016)  
magnesium sulfate 2 g/50 ml IVPB (premix or compounded) (2 g IntraVENous New Bag 2/27/19 0013)  
sodium chloride 0.9 % bolus infusion 1,000 mL (1,000 mL IntraVENous New Bag 2/26/19 2359)  
vancomycin (VANCOCIN) 1500 mg in  ml infusion (1,500 mg IntraVENous New Bag 2/27/19 0100) Lab findings: 
Recent Results (from the past 12 hour(s)) METABOLIC PANEL, COMPREHENSIVE Collection Time: 02/26/19 11:30 PM  
Result Value Ref Range Sodium 132 (L) 136 - 145 mmol/L Potassium 3.1 (L) 3.5 - 5.5 mmol/L Chloride 94 (L) 100 - 108 mmol/L  
 CO2 28 21 - 32 mmol/L Anion gap 10 3.0 - 18 mmol/L Glucose 110 (H) 74 - 99 mg/dL BUN 16 7.0 - 18 MG/DL  Creatinine 1.26 0.6 - 1.3 MG/DL  
 BUN/Creatinine ratio 13 12 - 20 GFR est AA >60 >60 ml/min/1.73m2 GFR est non-AA >60 >60 ml/min/1.73m2 Calcium 7.7 (L) 8.5 - 10.1 MG/DL Bilirubin, total 0.2 0.2 - 1.0 MG/DL  
 ALT (SGPT) 16 16 - 61 U/L  
 AST (SGOT) 14 (L) 15 - 37 U/L Alk. phosphatase 99 45 - 117 U/L Protein, total 6.6 6.4 - 8.2 g/dL Albumin 2.8 (L) 3.4 - 5.0 g/dL Globulin 3.8 2.0 - 4.0 g/dL A-G Ratio 0.7 (L) 0.8 - 1.7    
CBC WITH AUTOMATED DIFF Collection Time: 02/26/19 11:30 PM  
Result Value Ref Range WBC 9.7 4.6 - 13.2 K/uL  
 RBC 3.68 (L) 4.70 - 5.50 M/uL HGB 9.2 (L) 13.0 - 16.0 g/dL HCT 29.4 (L) 36.0 - 48.0 % MCV 79.9 74.0 - 97.0 FL  
 MCH 25.0 24.0 - 34.0 PG  
 MCHC 31.3 31.0 - 37.0 g/dL  
 RDW 16.8 (H) 11.6 - 14.5 % PLATELET 877 442 - 679 K/uL MPV 9.3 9.2 - 11.8 FL  
 NEUTROPHILS 79 (H) 40 - 73 % LYMPHOCYTES 10 (L) 21 - 52 % MONOCYTES 9 3 - 10 % EOSINOPHILS 2 0 - 5 % BASOPHILS 0 0 - 2 %  
 ABS. NEUTROPHILS 7.6 1.8 - 8.0 K/UL  
 ABS. LYMPHOCYTES 1.0 0.9 - 3.6 K/UL  
 ABS. MONOCYTES 0.9 0.05 - 1.2 K/UL  
 ABS. EOSINOPHILS 0.2 0.0 - 0.4 K/UL  
 ABS. BASOPHILS 0.0 0.0 - 0.1 K/UL  
 DF AUTOMATED CARDIAC PANEL,(CK, CKMB & TROPONIN) Collection Time: 02/26/19 11:30 PM  
Result Value Ref Range  39 - 308 U/L  
 CK - MB 1.7 <3.6 ng/ml CK-MB Index 1.5 0.0 - 4.0 % Troponin-I, QT <0.02 0.0 - 0.045 NG/ML  
MAGNESIUM Collection Time: 02/26/19 11:30 PM  
Result Value Ref Range Magnesium 1.6 1.6 - 2.6 mg/dL EKG, 12 LEAD, INITIAL Collection Time: 02/26/19 11:33 PM  
Result Value Ref Range Ventricular Rate 89 BPM  
 Atrial Rate 89 BPM  
 P-R Interval 134 ms QRS Duration 90 ms Q-T Interval 494 ms QTC Calculation (Bezet) 601 ms Calculated P Axis 58 degrees Calculated R Axis 58 degrees Calculated T Axis 67 degrees Diagnosis Normal sinus rhythm Possible Left atrial enlargement Left ventricular hypertrophy Prolonged QT Abnormal ECG 
 When compared with ECG of 23-FEB-2019 01:04, 
ST elevation now present in Lateral leads Nonspecific T wave abnormality, improved in Lateral leads POC LACTIC ACID Collection Time: 02/26/19 11:52 PM  
Result Value Ref Range Lactic Acid (POC) 2.11 (HH) 0.40 - 2.00 mmol/L  
URINALYSIS W/ RFLX MICROSCOPIC Collection Time: 02/26/19 11:59 PM  
Result Value Ref Range Color YELLOW Appearance CLEAR Specific gravity 1.008 1.005 - 1.030    
 pH (UA) 7.5 5.0 - 8.0 Protein NEGATIVE  NEG mg/dL Glucose NEGATIVE  NEG mg/dL Ketone 40 (A) NEG mg/dL Bilirubin NEGATIVE  NEG Blood NEGATIVE  NEG Urobilinogen 0.2 0.2 - 1.0 EU/dL Nitrites NEGATIVE  NEG Leukocyte Esterase NEGATIVE  NEG    
POC LACTIC ACID Collection Time: 02/27/19  2:24 AM  
Result Value Ref Range Lactic Acid (POC) 1.20 0.40 - 2.00 mmol/L  
 
 
EKG interpretation by ED Physician: 
nsr with lvh. Prolonged ns st changesr Rate 89, pr 134, qtc 601 Qt more prolonged at this time Cardiac monitor: nl rate, reg rhythm, no ectopy Pulse ox: 94% X-Ray, CT or other radiology findings or impressions: 
XR CHEST PORT    (Results Pending) trach in place. Nap Progress notes, Consult notes or additional Procedure notes:  
Pt covered for recurrent sepsis; initially responded to fluid boluses. D/w dr Tammi Soto who will admit Pt prior to going upstairs noted to have recurrent hypotension. More fluids ordered and dr Tammi Soto at bedside to direct further care ED Critical Care Note System at risk for life threatening failure: neuro, cardiac, pulm Associated problems: hypotension, lactic acidosis, prolonged qt Critical Care services provided: management of sepsis, hypotension, fluids, abx, bedside reassessment, consult documentation Excluded procedures (time not included in critical care): ecg interp Total Critical Care Time (in minutes) 48 Sepsis Re-Assessment Documentation:  
 
Date: 2/27/2019 Time: 4:09 AM 
 
 
Vital Signs Level of Consciousness: Alert Temp: 96.6 °F (35.9 °C) Temp Source: Temporal 
Pulse (Heart Rate): 89 
Cardiac Rhythm: Normal sinus rhythm Resp Rate: 26 BP: (!) 73/47 MAP (Monitor): (!) 53 MAP (Calculated): 78 BP 1 Location: Left arm BP Patient Position: At rest 
MEWS Score: 2 Nl lactate but recurrent hypotension. Nl skin perfusion Reevaluation of patient:  
stable Disposition: 
Diagnosis: 1. Sepsis, due to unspecified organism ([address] 2. Hypotension, unspecified hypotension type Disposition: admit Follow-up Information None Medication List  
  
ASK your doctor about these medications   
baclofen 20 mg tablet Commonly known as:  LIORESAL 
  
buPROPion  mg SR tablet Commonly known as:  WELLBUTRIN SR 
  
cyclobenzaprine 10 mg tablet Commonly known as:  FLEXERIL 
  
DAILY MULTIPLE tablet Generic drug:  multivitamin DULoxetine 60 mg capsule Commonly known as:  CYMBALTA 
  
fentaNYL 25 mcg/hr PATCH Commonly known as:  DURAGESIC 
  
gabapentin 600 mg tablet Commonly known as:  NEURONTIN 
  
glipiZIDE SR 10 mg CR tablet Commonly known as:  GLUCOTROL XL 
  
lactobacillus acidoph-pectin 75 million cell -100 mg Cap capsule Commonly known as:  ACIDOPHILUS-PECTIN 
  
levETIRAcetam 500 mg ER tablet Commonly known as:  KEPPRA XR 
  
levoFLOXacin 750 mg tablet Commonly known as:  [place] Take 1 Tab by mouth daily for 7 days. metFORMIN 500 mg tablet Commonly known as:  GLUCOPHAGE 
  
mirtazapine 7.5 mg tablet Commonly known as:  REMERON 
  
omeprazole 20 mg capsule Commonly known as:  PRILOSEC PRO-STAT SUGAR FREE PO 
  
* SANTYL 250 unit/gram ointment Generic drug:  collagenase * SANTYL 250 unit/gram ointment Generic drug:  collagenase TYLENOL 325 mg tablet Generic drug:  acetaminophen  * This list has 2 medication(s) that are the same as other medications prescribed for you. Read the directions carefully, and ask your doctor or other care provider to review them with you.

## 2019-02-27 NOTE — PROGRESS NOTES
Called to ER - RN bagging patient due to respiratory distress and low sats - tried on trach collar, patient desat in the 80s, started bagging again - placed patient on transport vent at 50% FIO2 to transport to CT, then straight to ICU - upon arrival to ICU, patient moved to ICU bed and placed on vent at A/C 14, 450, +5, 50%

## 2019-02-27 NOTE — ED NOTES
Spoke with RUSTY Wallace from [facility] care who states patient received levaquin last at 0900 on 2/26. Pharmacy will re time dose for later this morning at 10 am. Dr. Marsha Thompson notified

## 2019-02-27 NOTE — PROGRESS NOTES
TRANSFER - IN REPORT: 
 
Verbal report received from JovanyRN(name) on Alyssa Olivier  being received from ED(unit) for routine progression of care Report consisted of patients Situation, Background, Assessment and  
Recommendations(SBAR). Information from the following report(s) SBAR, Kardex, [facility] and Recent Results was reviewed with the receiving nurse. Opportunity for questions and clarification was provided. Assessment completed upon patients arrival to unit and care assumed. Pt transfer to neuro unit cancelled, Pt taken to neuro ICU.

## 2019-02-27 NOTE — WOUND CARE
Patient seen 2/25/19 please see note below. Wound care reccomnedations: Mepilex to sacral/ischial wounds. Continue Prevalon Boots and every 2 hour turning. Nursing/[place]:  Please order ENVISION WOUND SURFACE on [address]. Call [facility] at [phone] to obtain PO #, provide [facility] Rental # [id number]. Then Call [facility] at [phone] to order rental bed (notify [facility] of PO # obtained). Linen instructions: One flat sheet and one TAP SystemContinue to turn patient every 2 hours and float heels at all times with Prevalon boots. When therapy no longer required, please call [phone] to return rental bed to [facility]. Routine Ileostomy care, empty pouch when 1/2 full. Perform complete ostomy site care to include wafer and pouch change 3x weekly and PRN. Wound/Ostomy Nurse Progress Note 
  
  
  
  
Patient: Christal Mahoney VXT:[birth year] 
  
MRN: [id number] 
  
 
  
 
  
  
Situation: Wound consult for wounds on buttocks and sacrum, and ostomy assessment. 
  
Background: Patient was admitted to [facility] on 2/23/19 for pneumonia. He is paraplegic and has a tracheostomy in place. 
  
Assessment: Patient was lying in bed receiving respiratory care when wound care arrived for consult. He was awake and alert, but has some difficulty speaking. He is quadriplegic. His respiratory therapist agreed to stand-by in case the patient needed suctioning during the wound consult. His ostomy appliance was assessed first. The wafer had come away from his skin, leaking a large effluent on the patient and his bedding. The appliance was removed and the patient was cleaned. His bedding was changed. A new appliance was fitted. His stoma is prolapsed but otherwise there are no concerns. His jude stomal skin is intact. Skin prep was used and his wafer and pouch was applied.  The patient was turned on his side in order to assess his buttocks and sacrum. He has multiple open stage 3 ulcers on his ischium, sacrum, and perineum. They are all red, without s/s of infection. The measurements are as follows: 
  
Right ischium: 4.8 x 3.7 x 0.2 cm. Right ischium medial: 4.6 x 1.9 x 0.2 cm Left ischium: 3.3 x 2.1 x 0.2 cm. Perineum: 1 x 2.1 x 0.2 Sacrum: 1.2 x 3.8 x0.2 cm. 
  
The wounds were cleansed and a new dressing of Mepilex foam was applied to all wounds.  
  
Recommendation: Patient is being discharged today. His orders for wound care are to continue with Mepilex dressing every other day. Specialty bed is recommended.

## 2019-02-27 NOTE — ASSESSMENT & PLAN NOTE
Improving with IVF, likely dehydration vs. autonomic dysfunction Continue IVF Monitor BP Continue Abx for now as for possible sepsis although he completed hospital course of IV Abx and it is less likely this is related to infection. Deescalate Abx if condition improves.

## 2019-02-27 NOTE — ED NOTES
Nursing supervisor notified ADT order changed to ICU after verbal order with read back received from Dr. Konrad Fernandez at bedside and placed.

## 2019-02-27 NOTE — PROGRESS NOTES
Pt seen and examined at bedside For plan please see H&P by Dr[facility]

## 2019-02-27 NOTE — ED NOTES
Respiratory paged at this time for deep suctioning after nurse has already attempted and patient still has a lot secretions and needing to be bagged. Patients BP improved to 165/87 after epi dose administered by Dr. Lei Coats

## 2019-02-27 NOTE — ED NOTES
Dr. Demetria Mcdonald at bedside requesting 1 mg cardiac EPI mixed in 9cc syringe. Epi and saline mixed at bedside by Dr. Demetria Mcdonald. BP currently 75/43 and patient remains to be bagged at this time via trach.

## 2019-02-27 NOTE — MANAGEMENT PLAN
Discharge/Transition Planning CM aware of pt from last admission. Listed under Observation. Pt is Long Term Resident at [facility] since Jan since father dementia worsened and brothers could not care for both. Quadriplegic with trach. Able to speak around trach. Bed ridden and needs full assist with all ADLs. Plan is to return to [facility]. Matched in [address] link Patient has designated __brother______________________ to participate in his/her discharge plan and to receive any needed information. Name: Lanette Miller Phone [phone] Patient and/or next of kin has been given and has signed the [facility] Outpatient Observation  Notification letter and all questions answered. Copy of this notice given to patient and copy placed on chart. Pt unable to sign, gave verbal 
 
Patient and/or next of kin has been given the Outpatient Observation Information and Notification letter and all questions answered. Care Management Interventions PCP Verified by CM: Yes Mode of Transport at Discharge: S Transition of Care Consult (CM Consult): SNF Partner SNF: Yes Current Support Network: [address] [place] [address] Confirm Follow Up Transport: Other (see comment)(S) Plan discussed with Pt/Family/Caregiver: Yes Freedom of Choice Offered: Yes [facility]charge [facility]

## 2019-02-27 NOTE — ED NOTES
Pharmacy called to re time vancomycin since patient not receiving levaquin. Will administer once re-timed

## 2019-02-27 NOTE — ED NOTES
Notified Dr. Pooja Carrasco via telephone of patients BP trending down and last MAP <65 but still receiving fluid bolus for sepsis. Patient remains alert and oriented at bedside. Verbal order from Dr. Pooja Carrasco to place Midodrine 10 mg 3X daily PO for patient with verbal read back.

## 2019-02-27 NOTE — PROGRESS NOTES
0600 Received pt via stretcher from ER with trach and ventilator. Connected to monitor, NBP cuff, pulse ox. Pt alert but quadraplegic with only spastic movements noted. Monitor denotes NSR with hypotension. Dr. Mckenzie Carbajal at bedside. Pt has rt colostomy in place. Pt has condom catheter in place with clear yellow urine noted. Pt has numerous pressure ulcers on buttocks, sacrum, scrotal areas as noted per skin man with dressings in place. Pt Prevalon boots in place. [facility] [address] changed per Dr. Mckenzie Carbajal at bedside to fenestrated 6 trach but still unable to obtain good tidal volumes. Pt placed on 100% trach colar with result o O2 sat 100 %. Tolerated procedure without difficulty Pt premedicated with fentanyl 50 mcg IV  As ordered.

## 2019-02-27 NOTE — ED TRIAGE NOTES
Patient arrived by EMS from [facility] for chief c/o Unresponsive. Patient withdrawals from pain, trach in place. According to EMS patient normally speaks and is oriented. PAtient discharged yesterday from this facility for sepsis.

## 2019-02-27 NOTE — PROGRESS NOTES
Problem: Dysphagia (Adult) Goal: *Acute Goals and Plan of Care (Insert Text) Recommendations: 
Diet: regular / thin Meds: per pt preference Aspiration Precautions Oral Care TID Goals:  Patient will: 1. Tolerate PO trials with 0 s/s overt distress in 4/5 trials 2. Utilize compensatory swallow strategies/maneuvers (decrease bite/sip, size/rate, alt. liq/sol) with min cues in 4/5 trials Outcome: Progressing Towards Goal 
 
Speech LAnguage Pathology bedside swallow  
evaluation & TREATMENT Patient: Clarence Barajas [de-identified]34 y.o. male) Date: 2/27/2019 Primary Diagnosis: Unresponsive episode [R41.89] Hypotension [I95.9] Unresponsive episode [R41.89] Hypotension [I95.9] Precautions: Aspiration PLOF: LTC 
ASSESSMENT : 
Based on the objective data described below, the patient presents with intact swallow fxn utilizing PMV in the setting of multiple comorbidities. Pt A&Ox4; s/p tracheostomy 2010, RT present throughout dysphagia evaluation with PMV inclusion. Pt reports endorsing regular/thin diet at baseline with cuffed trach, however, trach cuff was recently lost. Pt reports utilizing humidifier only during po intake. Pt with hx notable for multiple recent hospitalizations with PNA; quadraplegic with only spastic movements noted. Presented with size 6 fenestrated cuffed trach. Cuff deflated with no changes to sats (O2 95, HR 86). RT donned PMV with consistent minimal changes to sats aforementioned. Pt demo intact speech with PMV independently. Accepted trials of thin liquid +straw, puree, soft solid, and regular solid. Tolerated without overt distress with all trials. Pt safe for regular/thin diet with PMV. After evaluation pt left with PMV donned in NAD. SLP will follow for PMV and diet tolerance. Results d/w RN, Louisa Ocasio. Skilled therapy initiated: educated pt on aspiration precautions and purpose of PMV; verbalized comprehension. Patient will benefit from skilled intervention to address the above impairments. Patients rehabilitation potential is considered to be Fair Factors which may influence rehabilitation potential include:  
[]            None noted []            Mental ability/status [x]            Medical condition []            Home/family situation and support systems 
[]            Safety awareness 
[]            Pain tolerance/management []            Other: PLAN : 
Recommendations and Planned Interventions: 
Regular/thin with PMV Frequency/Duration: Patient will be followed by speech-language pathology 1-2 times  to address goals. Discharge Recommendations: LTC SUBJECTIVE:  
Patient stated I eat everything. OBJECTIVE:  
 
Past Medical History:  
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 No pertinent surgical history. Prior Level of Function/Home Situation: LTC Diet prior to admission: regular/thin Current Diet:  Regular/thin Cognitive and Communication Status: 
Neurologic State: Alert Orientation Level: Oriented X4 Cognition: Follows commands Perception: Appears intact Perseveration: No perseveration noted Safety/Judgement: Awareness of environment Oral Assessment: 
Oral Assessment Labial: No impairment Dentition: Intact Oral Hygiene: good Lingual: No impairment Velum: No impairment Mandible: No impairment P.O. Trials: 
Patient Position: [facility] Vocal quality prior to P.O.: Low volume Consistency Presented: Thin liquid; Solid;Mechanical soft;Puree How Presented: SLP-fed/presented; Successive swallows;Straw;Spoon Bolus Acceptance: No impairment Bolus Formation/Control: No impairment Propulsion: No impairment Oral Residue: None Initiation of Swallow: No impairment Laryngeal Elevation: Functional 
Aspiration Signs/Symptoms: None Pharyngeal Phase Characteristics: No impairment, issues, or problems Effective Modifications: None Cues for Modifications: None Oral Phase Severity: No impairment Pharyngeal Phase Severity : No impairment The severity rating is based on the following outcomes: GAL [place] Swallow Level 6 PAIN: 
Start of Eval/Tx: 0 End of Eval/Tx: 0 After treatment:  
[]            Patient left in no apparent distress sitting up in chair 
[x]            Patient left in no apparent distress in bed 
[x]            Call bell left within reach; nursing educated about acquisition of AAC pad for quadraplegic pt [x]            Nursing notified []            Family present 
[]            Caregiver present 
[]            Bed alarm activated COMMUNICATION/EDUCATION:  
[x]            Aspiration precautions; swallow safety; compensatory techniques. [x]            Patient/family have participated as able in goal setting and plan of care. [x]            Patient/family agree to work toward stated goals and plan of care. []            Patient understands intent and goals of therapy; neutral about participation. []            Patient unable to participate in goal setting/plan of care; educ ongoing with interdisciplinary staff 
[]         Posted safety precautions in patient's room. Thank you for this referral. 
NADIRA Moss Time Calculation: 25 mins Evaluation Time: 17 minutes Treatment Time: 5 minutes

## 2019-02-27 NOTE — ED NOTES
Patient is a difficult stick. Care delayed at this time. Dr. Nathalie Stephens notified of patients BP 74/47

## 2019-02-27 NOTE — PROGRESS NOTES
Patient arrived with cuffless size 6 trach - when placed on vent, inadequate tidal volumes  - trach changed by Dr. Clancy Common to a size 6 fenestrated trach, inner cannula placed - patient placed back on vent - inadequate tidal volumes achieved on various modes of ventilation - patient placed on 100% trach collar, sats 100%, no s/s respiratory distress noted - will closely monitor

## 2019-02-27 NOTE — H&P
Internal Medicine History and Physical 
   
 
 
Subjective HPI: Susie Barrera is a 34 y.o. male who presented to the ED with unresponsive episode. He denied any sx prior to the event. Recently hospitalized for PNA and sepsis, treated with IV Abx (vanco and Zosyn) and discharged yesterday on oral Levaquin. ED evaluation revealed a hypotensive patient, poorly responsive. Blood glucose normal. Given 3L IVF and slowly improved. Labs with SONDRA. Will admit for further evaluation and treatment PMHx: 
Past Medical History:  
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 
History reviewed. No pertinent surgical history. SocialHx: 
Social History Socioeconomic History  Marital status: SINGLE Spouse name: Not on file  Number of children: Not on file  Years of education: Not on file  Highest education level: Not on file Social Needs  Financial resource strain: Not on file  Food insecurity - worry: Not on file  Food insecurity - inability: Not on file  Transportation needs - medical: Not on file  Transportation needs - non-medical: Not on file Occupational History  Not on file Tobacco Use  Smoking status: Never Smoker  Smokeless tobacco: Never Used Substance and Sexual Activity  Alcohol use: No  
  Frequency: Never  Drug use: Not on file  Sexual activity: Not on file Other Topics Concern  Not on file Social History Narrative  Not on file Allergies: Allergies Allergen Reactions  Heparin Other (comments) FamilyHx: 
History reviewed. No pertinent family history. Prior to Admission Medications Prescriptions Last Dose Informant Patient Reported? Taking? DULoxetine (CYMBALTA) 60 mg capsule   Yes No  
acetaminophen (TYLENOL) 325 mg tablet   Yes No  
Sig: Take 650 mg by mouth every four (4) hours as needed for Pain. amino acids/protein hydrolys (PRO-STAT SUGAR FREE PO)   Yes No  
Sig: Take  by mouth. baclofen (LIORESAL) 20 mg tablet   Yes No  
buPROPion SR (WELLBUTRIN SR) 150 mg SR tablet   Yes No  
collagenase (SANTYL) 250 unit/gram ointment   Yes No  
Sig: Apply  to affected area daily. collagenase (SANTYL) 250 unit/gram ointment   Yes No  
Sig: Apply  to affected area daily. cyclobenzaprine (FLEXERIL) 10 mg tablet   Yes No  
Sig: Take  by mouth three (3) times daily as needed for Muscle Spasm(s). fentaNYL (DURAGESIC) 25 mcg/hr PATCH   Yes No  
gabapentin (NEURONTIN) 600 mg tablet   Yes No  
glipiZIDE SR (GLUCOTROL XL) 10 mg CR tablet   Yes No  
lactobacillus acidoph-pectin (ACIDOPHILUS-PECTIN) 75 million cell -100 mg cap capsule   Yes No  
Sig: Take 1 Cap by mouth daily. levETIRAcetam (KEPPRA XR) 500 mg ER tablet   Yes No  
Sig: Take 500 mg by mouth daily. levoFLOXacin (LEVAQUIN) 750 mg tablet   No No  
Sig: Take 1 Tab by mouth daily for 7 days. metFORMIN (GLUCOPHAGE) 500 mg tablet   Yes No  
Sig: Take  by mouth two (2) times daily (with meals). mirtazapine (REMERON) 7.5 mg tablet   Yes No  
Sig: Take 7.5 mg by mouth nightly. multivitamin (DAILY MULTIPLE) tablet   Yes No  
Sig: Take 1 Tab by mouth daily. omeprazole (PRILOSEC) 20 mg capsule   Yes No  
Sig: Take 20 mg by mouth daily. Facility-Administered Medications: None Review of Systems: 
CONST: fever(-),   chills(-),   fatigue(-),   malaise(-) SKIN: itching(-),   rash(-) EYES: vision - no change from baseline ENT: ear pain(-),   nasal discharge(-),   sore throat(-),   voice change(-), trach in place RESP: SOB(-),   cough(-),   hemoptysis(-) CV: chest pain(-),   PND(-),   orthopnea(-),   exertional dyspnea(-),   palpitations(-), syncope(-) 
GI: abd pain(-),   nausea(-),   vomiting(-),   diarrhea(-),   melena(-),   hematemesis(-), hematochesia(-) 
: dysuria(-),   frequency(-),   urgency(-),   hematuria(-) 
MS: arthralgias(-),   myalgias(-) NEURO: speech w/o change,   tremors(-),   seizures(-),   numbness(-),   dizziness(-) Objective Visit Vitals /77 Pulse 80 Temp 97.7 °F (36.5 °C) Resp 18 Ht 6' (1.829 m) Wt 57.2 kg (126 lb) SpO2 99% BMI 17.09 kg/m² Physical Exam: 
CONST: tired,   VSS reviewed SKIN: rashes(-),   ulcers(-), stage 2-3 sacral pressure ulcers, healing EYES: PERRL,   EOMI,   sclerae w/o jaundice HENT: HEENT,   normal appearing nose and ears,   normal nasal mucosa and turbinates NECK: supple,   no LA,   trachea is midline,   thyroid w/o goiter or palpable nodules, trach in place, patent RESP: normal respiratory effort,   wheezes(-),   rales(-),   rhochi(-),   no consolidation on percussion CHEST: normal axillae,   retractions(-),   use of accessory muscles(-) CV: JVD(-),   carotid bruits(-),   RRR,   gallops(-),   murmurs(-),   edema LE(-),   abd bruits(-),   peripheral pulses normal 
ABD: soft, tender(-),   distended(-),   HSM(-),   BS(+) in all quadrants,   peritoneal signs(-), colostomy in place, patent, surgical scars healed, condom catheter in place draining urine MS: quadriplegic,  clubbing(-),   peripheral cyanosis(-) NEURO: speech normal, quadriplegic PSYCH: AOC x 3,   appropriate affect,   non-suicidal 
 
 
Laboratory Studies: All lab results for the last 24 hours reviewed. Imaging Reviewed: 
No results found. Assessment/Plan Active Hospital Problems Diagnosis Date Noted  Hypotension 02/27/2019  Unresponsive episode 02/27/2019 Unresponsive episode Resolved for now, likely related to hypotension Continue IVF and monitor clinically Hypotension Improving with IVF, likely dehydration vs. autonomic dysfunction Continue IVF Monitor BP Continue Abx for now as for possible sepsis although he completed hospital course of IV Abx and it is less likely this is related to infection. Deescalate Abx if condition improves. - Cont acceptable home medications for chronic conditions  
- DVT protocol and GI prophylaxis I have personally reviewed all pertinent labs, films and EKGs that have officially resulted. I reviewed available electronic documentation outlining the initial presentation as well as the emergency room physician's encounter. Total time spent with patient and chart review, orders and documentation - 45min out of which more than 50% spent face-to-face with patient. Geovany Martinez MD 
2/27/2019  
2:53 AM  
 
 
[facility]

## 2019-02-27 NOTE — ED NOTES
TRANSFER - ED to INPATIENT REPORT: 
 
SBAR report made available to receiving floor on this patient being transferred to [facility] ([id number])  for routine progression of care Admitting diagnosis Unresponsive episode [R41.89] Hypotension [I95.9] Information from the following report(s) SBAR, Kardex, ED Summary and MAR was made available to receiving floor. Lines:  
Peripheral IV 02/26/19 Right Wrist (Active) Site Assessment Clean, dry, & intact 2/26/2019 11:51 PM  
Phlebitis Assessment 0 2/26/2019 11:51 PM  
Infiltration Assessment 0 2/26/2019 11:51 PM  
Dressing Status Clean, dry, & intact 2/26/2019 11:51 PM  
Dressing Type Transparent 2/26/2019 11:51 PM  
Hub Color/Line Status Patent; Flushed 2/26/2019 11:51 PM  
   
Peripheral IV 02/26/19 Left Hand (Active) Site Assessment Clean, dry, & intact 2/26/2019 11:53 PM  
Phlebitis Assessment 0 2/26/2019 11:53 PM  
Infiltration Assessment 0 2/26/2019 11:53 PM  
Dressing Status Clean, dry, & intact 2/26/2019 11:53 PM  
Dressing Type Transparent 2/26/2019 11:53 PM  
Hub Color/Line Status Patent; Flushed 2/26/2019 11:53 PM  
Action Taken Blood drawn 2/26/2019 11:53 PM  
  
 
Medication list unable to confirm Opportunity for questions and clarification was provided. Patient is oriented to time, place, person and situation Patient is  incontinent and non-ambulatory Valuables transported with patient Patient transported with: 
 Monitor Registered Nurse Tech 
 
MAP (Monitor): (!) 53 =Monitored (most recent) Vitals w/ MEWS Score (last day) Date/Time MEWS Score Pulse Resp Temp BP Level of Consciousness SpO2  
 02/27/19 0330    89  26    73/47  (Abnormal)     79 %  (Abnormal) 02/27/19 03:24:31  2  90  18  96.6 °F (35.9 °C)  97/69  Alert  95 % 02/27/19 0315    94  18    97/69    96 % 02/27/19 0300    90  15    87/57  (Abnormal)     96 % 02/27/19 0245    89  21    93/58    97 % 02/27/19 0230    80  14    105/65    98 % 02/27/19 0223    80  13    118/74    96 % 02/27/19 0215    84          96 % 02/27/19 0200    92          97 % 02/27/19 0145    89          97 % 02/27/19 0130    83      88/55  (Abnormal)     97 % 02/27/19 0115    84      87/54  (Abnormal)     97 % 02/27/19 0100    80      113/77      
 02/27/19 0045    85      101/61    99 % 02/27/19 0030    78      74/40  (Abnormal)     96 % 02/27/19 00:24:22              96 % 02/27/19 0015    90      88/54  (Abnormal)     97 % 02/27/19 0000    96          95 % 02/26/19 2349    98      99/55    91 % 02/26/19 2345    102  (Abnormal)           93 % 02/26/19 23:32:45  3  89  18  97.7 °F (36.5 °C)  74/47  (Abnormal)   Alert  97 % 02/26/19 2330    89      74/47  (Abnormal)     97 % Septic Patients:  
 
Lactic Acid Lab Results Component Value Date LACPO 1.20 02/27/2019 LACPO 2.11 ([facility]) 02/26/2019  
 (Most recent on top) Repeat drawn: YES Time: 0230 ALL LACTIC ACIDS GREATER THAN 2 MUST BE REPEATED POC WITHIN 4 HOURS OR PRIOR TO ADMISSION Total Fluid Bolus initiated and documented on MAR: YES All ordered antibiotics initiated within first 3 hours of TIME ZERO?   YES

## 2019-02-27 NOTE — ED NOTES
Patients states, \"I feel like my chest is getting tight\". I asked him if he felt like he need to be suctioned. Pt suctioned with 14F Trach suction with minimal effect patient is currently being bagged at the bedside by tech spo2 at 85%. Request to page hospitalist made. Will continue to monitor and manually bag pt.

## 2019-02-27 NOTE — PROGRESS NOTES
conducted an initial consultation and Spiritual Assessment for Loan Smith, who is a 34 y.o.,male. Patients Primary Language is: [place]. According to the patients EMR [facility] Affiliation is: No preference. The reason the Patient came to the hospital is:  
Patient Active Problem List  
 Diagnosis Date Noted  Hypotension 02/27/2019  Unresponsive episode 02/27/2019  Sepsis ([facility] 02/23/2019  UTI (urinary tract infection) 02/23/2019  DM (diabetes mellitus) ([facility] 02/23/2019  Acute on chronic respiratory failure with hypoxia ([facility] 02/23/2019  GERD (gastroesophageal reflux disease) 02/23/2019  Quadriplegia ([facility] 02/23/2019  Seizures ([facility] 02/23/2019 The  provided the following Interventions: 
 attempted to Initiate a relationship of care and support with patient in room 2703 today. Patient however presents with multiple medical problems and is unable to communicate at this time. Patient also has a trache. No family seen at this time. Provided information about Spiritual Care Services. Offered prayer and assurance of continued prayers on patients behalf. Assessment: 
Patient does not have any Episcopalian/cultural needs that will affect patients preferences in health care. There are no further spiritual or Episcopalian issues which require Spiritual Care Services interventions at this time. Plan: 
Chaplains will continue to follow and will provide pastoral care on an as needed/requested basis Theodora Krishnan 3 Board Certified [employer] Spiritual Care  
[phone]

## 2019-02-27 NOTE — ED NOTES
Skin assessment: Midline abdominal scar from previous sx, RLQ colostomy bag. MS/Skin: Prevelon boots to bilateral legs Resp: 96 SPO2 on room air with Tracheostomy

## 2019-02-28 ENCOUNTER — APPOINTMENT (OUTPATIENT)
Dept: GENERAL RADIOLOGY | Age: 39
DRG: 870 | End: 2019-02-28
Attending: NURSE PRACTITIONER
Payer: MEDICARE

## 2019-02-28 LAB
ANION GAP SERPL CALC-SCNC: 5 MMOL/L (ref 3–18)
BACTERIA SPEC CULT: ABNORMAL
BACTERIA SPEC CULT: NORMAL
BASOPHILS # BLD: 0 K/UL (ref 0–0.1)
BASOPHILS NFR BLD: 0 % (ref 0–2)
BUN SERPL-MCNC: 6 MG/DL (ref 7–18)
BUN/CREAT SERPL: 9 (ref 12–20)
CA-I SERPL-SCNC: 1.1 MMOL/L (ref 1.12–1.32)
CALCIUM SERPL-MCNC: 8 MG/DL (ref 8.5–10.1)
CHLORIDE SERPL-SCNC: 109 MMOL/L (ref 100–108)
CO2 SERPL-SCNC: 27 MMOL/L (ref 21–32)
CREAT SERPL-MCNC: 0.68 MG/DL (ref 0.6–1.3)
DIFFERENTIAL METHOD BLD: ABNORMAL
EOSINOPHIL # BLD: 0.3 K/UL (ref 0–0.4)
EOSINOPHIL NFR BLD: 4 % (ref 0–5)
ERYTHROCYTE [DISTWIDTH] IN BLOOD BY AUTOMATED COUNT: 17.1 % (ref 11.6–14.5)
GLUCOSE BLD STRIP.AUTO-MCNC: 107 MG/DL (ref 70–110)
GLUCOSE BLD STRIP.AUTO-MCNC: 140 MG/DL (ref 70–110)
GLUCOSE BLD STRIP.AUTO-MCNC: 170 MG/DL (ref 70–110)
GLUCOSE BLD STRIP.AUTO-MCNC: 92 MG/DL (ref 70–110)
GLUCOSE SERPL-MCNC: 82 MG/DL (ref 74–99)
GRAM STN SPEC: ABNORMAL
HCT VFR BLD AUTO: 29.4 % (ref 36–48)
HGB BLD-MCNC: 8.9 G/DL (ref 13–16)
LYMPHOCYTES # BLD: 1.3 K/UL (ref 0.9–3.6)
LYMPHOCYTES NFR BLD: 16 % (ref 21–52)
MAGNESIUM SERPL-MCNC: 1.9 MG/DL (ref 1.6–2.6)
MCH RBC QN AUTO: 24.7 PG (ref 24–34)
MCHC RBC AUTO-ENTMCNC: 30.3 G/DL (ref 31–37)
MCV RBC AUTO: 81.4 FL (ref 74–97)
MONOCYTES # BLD: 0.6 K/UL (ref 0.05–1.2)
MONOCYTES NFR BLD: 7 % (ref 3–10)
NEUTS SEG # BLD: 6 K/UL (ref 1.8–8)
NEUTS SEG NFR BLD: 73 % (ref 40–73)
PHOSPHATE SERPL-MCNC: 2 MG/DL (ref 2.5–4.9)
PLATELET # BLD AUTO: 327 K/UL (ref 135–420)
PMV BLD AUTO: 9.3 FL (ref 9.2–11.8)
POTASSIUM SERPL-SCNC: 3.7 MMOL/L (ref 3.5–5.5)
RBC # BLD AUTO: 3.61 M/UL (ref 4.7–5.5)
SERVICE CMNT-IMP: ABNORMAL
SERVICE CMNT-IMP: NORMAL
SODIUM SERPL-SCNC: 141 MMOL/L (ref 136–145)
VANCOMYCIN TROUGH SERPL-MCNC: 18.7 UG/ML (ref 10–20)
WBC # BLD AUTO: 8.2 K/UL (ref 4.6–13.2)

## 2019-02-28 PROCEDURE — 83735 ASSAY OF MAGNESIUM: CPT

## 2019-02-28 PROCEDURE — 65660000001 HC RM ICU INTERMED STEPDOWN

## 2019-02-28 PROCEDURE — 36415 COLL VENOUS BLD VENIPUNCTURE: CPT

## 2019-02-28 PROCEDURE — 74011250636 HC RX REV CODE- 250/636: Performed by: NURSE PRACTITIONER

## 2019-02-28 PROCEDURE — 74011000250 HC RX REV CODE- 250: Performed by: FAMILY MEDICINE

## 2019-02-28 PROCEDURE — 80202 ASSAY OF VANCOMYCIN: CPT

## 2019-02-28 PROCEDURE — 89220 SPUTUM SPECIMEN COLLECTION: CPT

## 2019-02-28 PROCEDURE — 71045 X-RAY EXAM CHEST 1 VIEW: CPT

## 2019-02-28 PROCEDURE — 74011250637 HC RX REV CODE- 250/637: Performed by: INTERNAL MEDICINE

## 2019-02-28 PROCEDURE — 92526 ORAL FUNCTION THERAPY: CPT

## 2019-02-28 PROCEDURE — 74011250637 HC RX REV CODE- 250/637: Performed by: FAMILY MEDICINE

## 2019-02-28 PROCEDURE — 84100 ASSAY OF PHOSPHORUS: CPT

## 2019-02-28 PROCEDURE — 74011000258 HC RX REV CODE- 258: Performed by: HOSPITALIST

## 2019-02-28 PROCEDURE — 74011250636 HC RX REV CODE- 250/636: Performed by: HOSPITALIST

## 2019-02-28 PROCEDURE — 77010033678 HC OXYGEN DAILY

## 2019-02-28 PROCEDURE — 74011636637 HC RX REV CODE- 636/637: Performed by: FAMILY MEDICINE

## 2019-02-28 PROCEDURE — 94640 AIRWAY INHALATION TREATMENT: CPT

## 2019-02-28 PROCEDURE — 87641 MR-STAPH DNA AMP PROBE: CPT

## 2019-02-28 PROCEDURE — 80048 BASIC METABOLIC PNL TOTAL CA: CPT

## 2019-02-28 PROCEDURE — 99218 HC RM OBSERVATION: CPT

## 2019-02-28 PROCEDURE — 85025 COMPLETE CBC W/AUTO DIFF WBC: CPT

## 2019-02-28 PROCEDURE — 82330 ASSAY OF CALCIUM: CPT

## 2019-02-28 PROCEDURE — 82962 GLUCOSE BLOOD TEST: CPT

## 2019-02-28 RX ORDER — CYCLOBENZAPRINE HCL 10 MG
10 TABLET ORAL
Status: DISCONTINUED | OUTPATIENT
Start: 2019-02-28 | End: 2019-03-13 | Stop reason: HOSPADM

## 2019-02-28 RX ORDER — SODIUM CHLORIDE AND POTASSIUM CHLORIDE .9; .15 G/100ML; G/100ML
SOLUTION INTRAVENOUS CONTINUOUS
Status: DISPENSED | OUTPATIENT
Start: 2019-02-28 | End: 2019-03-02

## 2019-02-28 RX ORDER — MIDODRINE HYDROCHLORIDE 2.5 MG/1
5 TABLET ORAL
Status: DISCONTINUED | OUTPATIENT
Start: 2019-03-01 | End: 2019-03-13 | Stop reason: HOSPADM

## 2019-02-28 RX ADMIN — PANTOPRAZOLE SODIUM 20 MG: 20 TABLET, DELAYED RELEASE ORAL at 09:00

## 2019-02-28 RX ADMIN — PIPERACILLIN SODIUM,TAZOBACTAM SODIUM 4.5 G: 4; .5 INJECTION, POWDER, FOR SOLUTION INTRAVENOUS at 03:00

## 2019-02-28 RX ADMIN — MULTIVITAMIN TABLET 1 TABLET: TABLET at 09:00

## 2019-02-28 RX ADMIN — LACTOBACILLUS TAB 1 TABLET: TAB at 09:00

## 2019-02-28 RX ADMIN — SODIUM CHLORIDE 1000 MG: 900 INJECTION, SOLUTION INTRAVENOUS at 04:15

## 2019-02-28 RX ADMIN — SODIUM CHLORIDE 1000 MG: 900 INJECTION, SOLUTION INTRAVENOUS at 16:03

## 2019-02-28 RX ADMIN — GABAPENTIN 600 MG: 300 CAPSULE ORAL at 16:03

## 2019-02-28 RX ADMIN — BUPROPION HYDROCHLORIDE 150 MG: 150 TABLET, EXTENDED RELEASE ORAL at 09:00

## 2019-02-28 RX ADMIN — IPRATROPIUM BROMIDE AND ALBUTEROL SULFATE 3 ML: .5; 3 SOLUTION RESPIRATORY (INHALATION) at 20:00

## 2019-02-28 RX ADMIN — IPRATROPIUM BROMIDE AND ALBUTEROL SULFATE 3 ML: .5; 3 SOLUTION RESPIRATORY (INHALATION) at 08:09

## 2019-02-28 RX ADMIN — SULFAMETHOXAZOLE AND TRIMETHOPRIM 1 TABLET: 400; 80 TABLET ORAL at 00:15

## 2019-02-28 RX ADMIN — IPRATROPIUM BROMIDE AND ALBUTEROL SULFATE 3 ML: .5; 3 SOLUTION RESPIRATORY (INHALATION) at 04:25

## 2019-02-28 RX ADMIN — MIRTAZAPINE 7.5 MG: 15 TABLET, FILM COATED ORAL at 22:41

## 2019-02-28 RX ADMIN — SULFAMETHOXAZOLE AND TRIMETHOPRIM 1 TABLET: 400; 80 TABLET ORAL at 09:00

## 2019-02-28 RX ADMIN — MIDODRINE HYDROCHLORIDE 10 MG: 10 TABLET ORAL at 09:00

## 2019-02-28 RX ADMIN — ACETYLCYSTEINE 400 MG: 100 SOLUTION ORAL; RESPIRATORY (INHALATION) at 08:09

## 2019-02-28 RX ADMIN — PIPERACILLIN SODIUM,TAZOBACTAM SODIUM 4.5 G: 4; .5 INJECTION, POWDER, FOR SOLUTION INTRAVENOUS at 18:58

## 2019-02-28 RX ADMIN — MIDODRINE HYDROCHLORIDE 10 MG: 10 TABLET ORAL at 16:47

## 2019-02-28 RX ADMIN — PIPERACILLIN SODIUM,TAZOBACTAM SODIUM 4.5 G: 4; .5 INJECTION, POWDER, FOR SOLUTION INTRAVENOUS at 11:15

## 2019-02-28 RX ADMIN — IPRATROPIUM BROMIDE AND ALBUTEROL SULFATE 3 ML: .5; 3 SOLUTION RESPIRATORY (INHALATION) at 12:00

## 2019-02-28 RX ADMIN — BACLOFEN 10 MG: 10 TABLET ORAL at 09:00

## 2019-02-28 RX ADMIN — SULFAMETHOXAZOLE AND TRIMETHOPRIM 1 TABLET: 400; 80 TABLET ORAL at 20:38

## 2019-02-28 RX ADMIN — GABAPENTIN 600 MG: 300 CAPSULE ORAL at 09:00

## 2019-02-28 RX ADMIN — BACLOFEN 10 MG: 10 TABLET ORAL at 18:58

## 2019-02-28 RX ADMIN — INSULIN LISPRO 2 UNITS: 100 INJECTION, SOLUTION INTRAVENOUS; SUBCUTANEOUS at 12:42

## 2019-02-28 RX ADMIN — DULOXETINE 60 MG: 60 CAPSULE, DELAYED RELEASE ORAL at 09:00

## 2019-02-28 RX ADMIN — CYCLOBENZAPRINE HYDROCHLORIDE 10 MG: 10 TABLET, FILM COATED ORAL at 20:34

## 2019-02-28 RX ADMIN — GABAPENTIN 600 MG: 300 CAPSULE ORAL at 22:41

## 2019-02-28 RX ADMIN — LEVETIRACETAM 500 MG: 500 TABLET, EXTENDED RELEASE ORAL at 09:00

## 2019-02-28 RX ADMIN — ACETYLCYSTEINE 400 MG: 100 SOLUTION ORAL; RESPIRATORY (INHALATION) at 15:42

## 2019-02-28 RX ADMIN — IPRATROPIUM BROMIDE AND ALBUTEROL SULFATE 3 ML: .5; 3 SOLUTION RESPIRATORY (INHALATION) at 15:42

## 2019-02-28 RX ADMIN — SODIUM CHLORIDE AND POTASSIUM CHLORIDE: 9; 1.49 INJECTION, SOLUTION INTRAVENOUS at 16:03

## 2019-02-28 NOTE — PROGRESS NOTES
0730 Report received at the bedside from off going RN covering SBAR drains lines and plan. Patient with multiple know bed sores/decubitus, consult to wound nurse placed. All wounds covered with mepalex after cleaning at this time. Cecostomy intact and draining loose stool. 1700 patient eating well, using passe mur valve cover to trach with good results, and over trach collar for 02 support. Patient continues to have highly responsive nervous system causing periods of shakes and spasms. 1930 Report given to Joce Lopez RN at the bedside covering SBAR, lines drains and plan.

## 2019-02-28 NOTE — PROGRESS NOTES
Pulmonary progress note History 34year-old quadriplegic presented to the emergency room on 2/26/19 secondary to unresponsiveness. Chest CT showed bilateral lower lobe infiltrates left greater than right with a subtle right upper lobe infiltrate. Sputum collected on 2/23/19 grew stenotrophomonas. The patient's antibiotics were changed from Levaquin to Bactrim. Vancomycin and Zosyn were continued. Overnight the patient's sputum production has decreased. He denies significant shortness of breath and believes that he is breathing easier despite having the Passy-Decherd valve in place. Exam 
He is quadriplegic. He can speak understandably Blood pressure (!) 168/104, pulse 96, temperature 99.7 °F (37.6 °C), resp. rate 22, height 5' 7\" (1.702 m), weight 57.2 kg (126 lb), SpO2 100 %. Gaze is conjugate. Extraocular muscles are intact. No drainage from tracheostomy tube site Lungs are clear Heart has a regular rate and rhythm Abdomen is nondistended Spastic tremors of the extremities with suctioning No significant edema Full sensitivities of stenotrophomonas are pending WBC is normal and there are no bands. Sodium is 141, potassium 3.7, creatinine 0.68, calcium 8.0, phosphorus 2.0 and magnesium 1.9. Assessment Quadriplegic with chronic vent dependency Bilateral lower lobe infiltrates with subtle right upper lobe infiltrate likely representing pneumonia and cannot exclude aspiration Stenotrophomonas positive sputum now on Bactrim Plan Continue Bactrim for 7 days Screen nares for MRSA. If negative, discontinue vancomycin If secretion volume decreases and completely clears, discontinue Zosyn Continue CoughAssist device Continued nebs therapies as necessary Electrolyte replacement scales

## 2019-02-28 NOTE — PROGRESS NOTES
Cough assist given, I:40/E:40 5 breaths x 5 cycles. Suctioned large amount of thick, white secretions. Vitals remained stable throughout therapy.

## 2019-02-28 NOTE — PROGRESS NOTES
0700: Report received from [facility]. Pt is resting in bed, VSS trach collar at 10 L /min. Will continue to monitor. 0900: RN assisted pt to eat his breakfast.   95% eaten, tolerated well.  
1000: Rounded on pt with care team.  New orders received 1100:  Colostomy bag changed, condom catheter changed. 1300:  RN assisted pt to eat lunch, 100% eaten. Tolerated well.  
1700: RN assisted pt to eat dinner, 100% eaten. Tolerated well.   
1800: Pt placed on new bed, underpads changed, condom catheter changed, incontinence care done. VSS.  
1900: Bedside and Verbal shift change report given to Elizabet Gordillo RN (oncoming nurse) by Claudia Rhoades 
 (offgoing nurse). Report included the following information SBAR, Kardex, ED Summary, Med Rec Status and Cardiac Rhythm NSR.

## 2019-02-28 NOTE — PROGRESS NOTES
Progress Note Patient: Jann Guzman               Sex: male          DOA: 2/26/2019 YOB: 1980      Age:  34 y.o.        LOS:  LOS: 0 days CHIEF COMPLAINT:  Unresponsive Subjective:  
 
Pt seen and examined at bedside No major events overnight Pt offers no complaints this AM 
Denies CP or SOB Denies abd pain Objective:  
  
Visit Vitals /89 Pulse 80 Temp 98.4 °F (36.9 °C) Resp 16 Ht 5' 7\" (1.702 m) Wt 57.2 kg (126 lb) SpO2 100% BMI 19.73 kg/m² Physical Exam: 
Physical Exam  
Constitutional: He is oriented to person, place, and time and well-developed, well-nourished, and in no distress. HENT:  
Head: Normocephalic and atraumatic. Eyes: Conjunctivae and EOM are normal. Pupils are equal, round, and reactive to light. Neck: Normal range of motion. +Tracheostomy with collar Cardiovascular: Normal rate, regular rhythm and normal heart sounds. Pulmonary/Chest: Effort normal.  
+coarse breath sounds Abdominal: Soft. Bowel sounds are normal.  
Musculoskeletal: Normal range of motion. Neurological: He is alert and oriented to person, place, and time. Skin: Skin is warm and dry. Nursing note and vitals reviewed. Lab/Data Reviewed: 
BMP:  
Lab Results Component Value Date/Time  02/28/2019 04:00 AM  
 K 3.7 02/28/2019 04:00 AM  
  (H) 02/28/2019 04:00 AM  
 CO2 27 02/28/2019 04:00 AM  
 AGAP 5 02/28/2019 04:00 AM  
 GLU 82 02/28/2019 04:00 AM  
 BUN 6 (L) 02/28/2019 04:00 AM  
 CREA 0.68 02/28/2019 04:00 AM  
 GFRAA >60 02/28/2019 04:00 AM  
 GFRNA >60 02/28/2019 04:00 AM  
 
CBC:  
Lab Results Component Value Date/Time WBC 8.2 02/28/2019 04:00 AM  
 HGB 8.9 (L) 02/28/2019 04:00 AM  
 HCT 29.4 (L) 02/28/2019 04:00 AM  
  02/28/2019 04:00 AM  
 
 
Imaging Reviewed: 
[facility] Result Date: 2/28/2019 Chest, single view Indication: Respiratory distress Comparison: Several prior exams, most recently 2/27/2019 Findings:  Portable upright AP view of the chest was obtained on 2 exposures. Tracheostomy catheter projects over the thoracic inlet. Lungs are mildly underexpanded by comparison to prior days examination. Left retrocardiac opacity without significant change from prior. Additional faint right upper lobe airspace disease demonstrated. No pneumothorax with small left pleural effusion. Cardiac size and mediastinal contours are stable. No acute osseous abnormality. IMPRESSION: 1. Tracheostomy catheter in stable position. 2. Left retrocardiac and right upper lobe airspace disease along with small left pleural effusion overall similar to preceding CT scan of the chest. 
 
 
Assessment:  
 
Principal Problem: 
  Unresponsive episode (2/27/2019) Active Problems: Hypotension (2/27/2019) PLAN: 
· Appreciate PCCM assistance, cont antibiotics for likely aspiration PNA · SLP consulted · Cont acceptable home medications for chronic conditions · DVT protocol I have personally reviewed all pertinent labs and films that have officially resulted over the last 24 hours. I have personally checked for all pending labs that are awaiting final results. Signed By: Paulette Lopez MD   
 February 28, 2019

## 2019-02-28 NOTE — PROGRESS NOTES
Problem: Falls - Risk of 
Goal: *Absence of Falls Document [facility]y Fall Risk and appropriate interventions in the flowsheet. Outcome: Progressing Towards Goal 
Fall Risk Interventions: 
  
 
  
 
Medication Interventions: Evaluate medications/consider consulting pharmacy Elimination Interventions: Bed/chair exit alarm, Toileting schedule/hourly rounds Problem: Pressure Injury - Risk of 
Goal: *Prevention of pressure injury Document Sarwat Scale and appropriate interventions in the flowsheet. Outcome: Progressing Towards Goal 
Pressure Injury Interventions: 
Sensory Interventions: Assess changes in LOC, Check visual cues for pain Moisture Interventions: Absorbent underpads, Check for incontinence Q2 hours and as needed, Internal/External fecal devices, Internal/External urinary devices Activity Interventions: Pressure redistribution bed/mattress(bed type) Mobility Interventions: Assess need for specialty bed, Float heels, HOB 30 degrees or less, Pressure redistribution bed/mattress (bed type), Suspension boots, Turn and reposition approx. every two hours(pillow and wedges) Nutrition Interventions: Document food/fluid/supplement intake Friction and Shear Interventions: Foam dressings/transparent film/skin sealants, HOB 30 degrees or less, Minimize layers

## 2019-02-28 NOTE — PROGRESS NOTES
Initial cough assist given, I:40/E:40 5 breaths x 5 cycles. Suctioned large amount of thick, white secretions. Vitals remained stable throughout therapy.

## 2019-02-28 NOTE — PROGRESS NOTES
Patient awake and alert, in bed on back with head elevated - patient in no apparent respiratory distress - trach midline and secure - spare trach at bedside - on 40% trach collar - CPT via vest done with no adverse effects noted

## 2019-02-28 NOTE — PROGRESS NOTES
Physical Exam  
Skin:  
 
  
 
 
Bedside shift change report was given to Treva jefferson RN  ( ICU night shift nurse), by Vee Morales RN ( ICU day shift nurse). Report included the following information:  SBAR, kardex, consultations, hemodynamic monitoring, intake/output, MAR, lab results, VS trends, cardiac rhythm noted NSR. Skin, neuro, respiratory status, order verification, and critical IV gtt rate verification has been dually noted and verified at the bedside with: Vee Morales RN    
                                 
ICU Nurse's Note: 
2000: Pt is awake, alert x 3. Pt is quadriplegic with non- purposeful movement to BUE or BLE. He has intermittent spastic tremors to all extremities. Pt requires max assist with all ADLs, feeding and repositioning. Bed is locked and in lowest position, side rails up x 2, call bell is within reach, exit alarm activated. Interventions are ongoing,will continue to monitor. Neurological: as noted in assessment,  
Cardiovascular: NSR on the monitor. Pulmonary: breath sounds coarse, diminished, saturations maintained at 100% via Oxygen at 10 LPM via trache collar. GI/: Abdomen is obese, semi soft, non-distended with active bowel sounds. Last BM noted 02/28/2019 in RLQ cecotomy. External condom catheter intact with adequate     urine output. IVF/Critical gtts: IV maintenance fluid, IV abx Skin: as noted above 
 
2200: HS accucheck noted 86, no SSI indicated. Pt tolerated regularly scheduled meds and HHn tx without incident. VSS, BP's are WNL. Pt denies current c/o. Interventions are ongoing, will continue to monitor. 0000: VSS, pt is resting quietly in bed after assist with repositioning and HS ADL's. 
0300: Scheduled am labs drawn at this time. 0400: VSS, pt is afebrile and resting quietly in bed after assist with repositioning, jude care, mouth care and trache care.  He is in no acute distress and denies c/o at this time. Interventions are ongoing, will continue to monitor.   
0600: Pt tolerated regularly scheduled am meds and portable bedside CXR without incident or c/o. 
 
0710: Bedside change of shift report given to: Brett Camp RN & Obed Agee RN

## 2019-02-28 NOTE — PROGRESS NOTES
Problem: Dysphagia (Adult) Goal: *Acute Goals and Plan of Care (Insert Text) Recommendations: 
Diet: regular / thin Meds: per pt preference Aspiration Precautions Oral Care TID Goals:  Patient will: 1. Tolerate PO trials with 0 s/s overt distress in 4/5 trials 2. Utilize compensatory swallow strategies/maneuvers (decrease bite/sip, size/rate, alt. liq/sol) with min cues in 4/5 trials Outcome: Resolved/Met Date Met: 02/28/19 Speech language pathology dysphagia treatment & discharge Patient: Jose Alejandro Gonzales [de-identified]34 y.o. male) Date: 2/28/2019 Diagnosis: Unresponsive episode [R41.89] Hypotension [I95.9] Unresponsive episode [R41.89] Hypotension [I95.9] Unresponsive episode Precautions: Aspiration PLOF: SN[facility] 
 
ASSESSMENT: 
Followed up this day with dysphagia intervention. PMV in place upon entering room. Pt demo intact speech with PMV independently. Accepted trials of thin liquid + straw, puree, soft solid, and regular solid. Tolerated without overt distress with all trials. Pt safe for regular/thin diet with PMV. After evaluation pt left with PMV donned in NAD. Reviewed aspiration precautions and purpose of PMV; verbalized comprehension, pt stating \"I've been through this before, I understand what I need to do\". Maximum therapeutic gains met; safest, least restrictive diet achieved in current in-patient/acute setting. Accordingly, SLP to d/c intervention at this time. Progression toward goals: 
[x]         Improving appropriately - goals met/approximated 
[]         Not making progress/Not appropriate - will d/c POC PLAN: 
Recommendations and Planned Interventions: 
Maximum therapeutic gains met; safest, least restrictive diet achieved. D/C ST intervention at this time. Discharge Recommendations:  Rolan Zaragoza SUBJECTIVE:  
Patient stated Thank you. OBJECTIVE:  
Cognitive and Communication Status: 
Neurologic State: Alert Orientation Level: Oriented X4 
 Cognition: Follows commands Perception: Appears intact Perseveration: No perseveration noted Safety/Judgement: Awareness of environment Dysphagia Treatment: 
Oral Assessment: 
Oral Assessment Labial: No impairment Dentition: Intact Oral Hygiene: good Lingual: No impairment Velum: No impairment Mandible: No impairment P.O. Trials: 
 Patient Position: YNH 41 Vocal quality prior to P.O.: Low volume Consistency Presented: Thin liquid, Solid, Mechanical soft, Puree How Presented: SLP-fed/presented, Successive swallows, Straw, Spoon Bolus Acceptance: No impairment Bolus Formation/Control: No impairment Propulsion: No impairment Oral Residue: None Initiation of Swallow: No impairment Laryngeal Elevation: Functional 
 Aspiration Signs/Symptoms: None Pharyngeal Phase Characteristics: No impairment, issues, or problems Effective Modifications: None Cues for Modifications: None Oral Phase Severity: No impairment Pharyngeal Phase Severity : No impairment PAIN: 
Start of Tx: 0 End of Tx: 0 The severity rating is based on the following outcomes: GAL Noms Swallow Level 7 Clinical Judgement After treatment:  
[]              Patient left in no apparent distress sitting up in chair 
[x]              Patient left in no apparent distress in bed 
[x]              Call bell left within reach [x]              Nursing notified 
[]              Caregiver present 
[]              Bed alarm activated COMMUNICATION/EDUCATION:  
[x] Safe swallowing guidelines; compensatory techniques [x]        Patient unable to participate in education; education ongoing with staff 
[]  Posted safety precautions in patient's room. [] Oral-motor/laryngeal strengthening exercises NADIRA Chacon Time Calculation: 12 mins

## 2019-02-28 NOTE — PROGRESS NOTES
Cough assist given I:40/E:40 5 breaths x 5 cycles. Suctioned mod amount, thick white secretions. Vitals stable throughout therapy.

## 2019-02-28 NOTE — PROGRESS NOTES
Problem: Falls - Risk of 
Goal: *Absence of Falls Document [facility] Fall Risk and appropriate interventions in the flowsheet. Outcome: Progressing Towards Goal 
Fall Risk Interventions: 
  
 
Mentation Interventions: Bed/chair exit alarm, Door open when patient unattended, Evaluate medications/consider consulting pharmacy, Eyeglasses and hearing aids, Familiar objects from home, Increase mobility, More frequent rounding, Reorient patient, Room close to nurse's station, Toileting rounds, Update white board Medication Interventions: Bed/chair exit alarm, Evaluate medications/consider consulting pharmacy Elimination Interventions: Call light in reach, Toileting schedule/hourly rounds, Toilet paper/wipes in reach Problem: Pressure Injury - Risk of 
Goal: *Prevention of pressure injury Document Sarwat Scale and appropriate interventions in the flowsheet. Outcome: Progressing Towards Goal 
Pressure Injury Interventions: 
Sensory Interventions: Assess changes in LOC, Assess need for specialty bed, Avoid rigorous massage over bony prominences, Maintain/enhance activity level, Keep linens dry and wrinkle-free, Pad between skin to skin, Pressure redistribution bed/mattress (bed type), Turn and reposition approx. every two hours (pillows and wedges if needed) Moisture Interventions: Absorbent underpads, Apply protective barrier, creams and emollients, Contain wound drainage, Assess need for specialty bed, Maintain skin hydration (lotion/cream), Minimize layers, Moisture barrier, Offer toileting Q_hr Activity Interventions: Assess need for specialty bed, Pressure redistribution bed/mattress(bed type) Mobility Interventions: Assess need for specialty bed, Pressure redistribution bed/mattress (bed type), Turn and reposition approx. every two hours(pillow and wedges) Nutrition Interventions: Document food/fluid/supplement intake, Discuss nutritional consult with provider, Offer support with meals,snacks and hydration Friction and Shear Interventions: Apply protective barrier, creams and emollients, Foam dressings/transparent film/skin sealants, Minimize layers, Lift team/patient mobility team, Lift sheet, Transferring/repositioning devices Problem: Impaired Skin Integrity/Pressure Injury Treatment Goal: *Prevention of pressure injury Document Sarwat Scale and appropriate interventions in the flowsheet. Outcome: Progressing Towards Goal 
Pressure Injury Interventions: 
Sensory Interventions: Assess changes in LOC, Assess need for specialty bed, Avoid rigorous massage over bony prominences, Maintain/enhance activity level, Keep linens dry and wrinkle-free, Pad between skin to skin, Pressure redistribution bed/mattress (bed type), Turn and reposition approx. every two hours (pillows and wedges if needed) Moisture Interventions: Absorbent underpads, Apply protective barrier, creams and emollients, Contain wound drainage, Assess need for specialty bed, Maintain skin hydration (lotion/cream), Minimize layers, Moisture barrier, Offer toileting Q_hr Activity Interventions: Assess need for specialty bed, Pressure redistribution bed/mattress(bed type) Mobility Interventions: Assess need for specialty bed, Pressure redistribution bed/mattress (bed type), Turn and reposition approx. every two hours(pillow and wedges) Nutrition Interventions: Document food/fluid/supplement intake, Discuss nutritional consult with provider, Offer support with meals,snacks and hydration Friction and Shear Interventions: Apply protective barrier, creams and emollients, Foam dressings/transparent film/skin sealants, Minimize layers, Lift team/patient mobility team, Lift sheet, Transferring/repositioning devices
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
## 2019-02-28 NOTE — DIABETES MGMT
NUTRITIONAL ASSESSMENT GLYCEMIC CONTROL/ PLAN OF CARE Sofie [facility]           34 y.o.           2/26/2019 1. Sepsis, due to unspecified organism ([facility] [address] 2. Hypotension, unspecified hypotension type Patient Active Problem List  
Diagnosis Code     
 UTI (urinary tract infection) N39.0  DM (diabetes mellitus) ([facility] [address] E11.9  Acute on chronic respiratory failure with hypoxia (HCC) J96.21  
 GERD (gastroesophageal reflux disease) K21.9  Quadriplegia ([facility] [address] G82.50 INTERVENTIONS/PLAN:  
Will add Strawberry Ensure Enlive tid. Encouraged increased intake at meals to meet calorie and protein requirements. ASSESSMENT:  
Nutritional Status: 
Pt is underweight related to inadequate caloric intake as evidenced by 85% ideal weight and BMI= 19.7kg/m2 . Pt with increased calorie and protein requirements related to multiple stage 3 and one stage 4 sacral ulcers. Pt with trach collar. Diabetes Management:  
Recent blood glucose: 
Lab Results Component Value Date/Time GLU 96 02/27/2019 10:25 AM  
  (H) 02/26/2019 11:30 PM  
 GLUCPOC 163 (H) 02/27/2019 04:00 PM  
 GLUCPOC 92 02/27/2019 12:09 PM  
 GLUCPOC 122 (H) 02/27/2019 08:21 AM  
 
 
Within target range (non-ICU: <140; ICU<180): [x] Yes   []  No 
 
Current Insulin regimen: corrective lispro Home medication/insulin regimen:   
Key Antihyperglycemic Medications   
    
  
 glipiZIDE SR (GLUCOTROL XL) 10 mg CR tablet   
 metFORMIN (GLUCOPHAGE) 500 mg tablet Take  by mouth two (2) times daily (with meals). HbA1c: 6%  equivalent  to ave Blood Glucose of 120 mg/dl for 2-3 months prior to admission Adequate glycemic control PTA:  [x] Yes  [] No 
  
 
SUBJECTIVE/OBJECTIVE:  
 
Diet: Diabetic Patient Vitals for the past 100 hrs: 
 % Diet Eaten 02/27/19 1600 95 % Medications: [x]                Reviewed  
 multivitamin Labs:  
Lab Results Component Value Date/Time Hemoglobin A1c 6.0 (H) 02/23/2019 07:25 AM  
 
Lab Results Component Value Date/Time Sodium 145 02/27/2019 10:25 AM  
 Potassium 3.6 02/27/2019 10:25 AM  
 Chloride 111 (H) 02/27/2019 10:25 AM  
 CO2 27 02/27/2019 10:25 AM  
 Anion gap 7 02/27/2019 10:25 AM  
 Glucose 96 02/27/2019 10:25 AM  
 BUN 9 02/27/2019 10:25 AM  
 Creatinine 0.84 02/27/2019 10:25 AM  
 Calcium 7.5 (L) 02/27/2019 10:25 AM  
 Magnesium 2.2 02/27/2019 10:25 AM  
 Phosphorus 2.6 02/27/2019 10:25 AM  
 Albumin 2.8 (L) 02/26/2019 11:30 PM  
 
 
Anthropometrics: IBW : 67.1 kg (148 lb), % IBW (Calculated): 85.14 %, BMI (calculated): 19.7 Wt Readings from Last 1 Encounters:  
02/27/19 57.2 kg (126 lb) Ht Readings from Last 1 Encounters:  
02/27/19 5' 7\" (1.702 m) Estimated Nutrition Needs:  1995 Kcals/day, Protein (g): 86 g Fluid (ml): 2000 ml Based on:   [x]          Actual BW    []          ABW   []            Adjusted BW   
 
Nutrition Diagnoses:  
    
Meets Criteria for Chronic Malnutrition  
[x]Moderate Malnutrition, as evidenced by: 
 [x] Mild muscle wasting, loss of subcutaneous fat 
 [x] Nutritional intake <75% of recommended intake for >1 month 
 [] Weight loss of  5% in 1 month, 7.5% in 3 months, 10% in 6 months, or 20% in 1 year Nutrition Interventions: Ensure Enlive Supplements to provide additional calories and protein. Goal:  
Blood glucose will be within target range of  mg/dL by 3/2. Weight gain 1 lb. per week 3/9/19. Nutrition Monitoring and Evaluation   
 
[x]     Monitor po intake on meal rounds 
[x]     Continue inpatient monitoring and intervention 
[]     Other: 
 
 
Nutrition Risk:  [x]   High     []  Moderate    []  Minimal/Uncompromised Brijesh Melendez RD 
pgr [phone]

## 2019-02-28 NOTE — PROGRESS NOTES
Problem: Falls - Risk of 
Goal: *Absence of Falls Document [facility] Fall Risk and appropriate interventions in the flowsheet. Outcome: Progressing Towards Goal 
Fall Risk Interventions: 
  
 
Mentation Interventions: Bed/chair exit alarm, Door open when patient unattended Medication Interventions: Bed/chair exit alarm Elimination Interventions: Bed/chair exit alarm Problem: Pressure Injury - Risk of 
Goal: *Prevention of pressure injury Document Sarwat Scale and appropriate interventions in the flowsheet. Outcome: Progressing Towards Goal 
Pressure Injury Interventions: 
Sensory Interventions: Assess changes in LOC, Float heels Moisture Interventions: Check for incontinence Q2 hours and as needed, Internal/External fecal devices, Internal/External urinary devices Activity Interventions: Assess need for specialty bed Mobility Interventions: Assess need for specialty bed Nutrition Interventions: Document food/fluid/supplement intake Friction and Shear Interventions: HOB 30 degrees or less, Minimize layers Problem: Impaired Skin Integrity/Pressure Injury Treatment Goal: *Prevention of pressure injury Document Sarwat Scale and appropriate interventions in the flowsheet. Outcome: Progressing Towards Goal 
Pressure Injury Interventions: 
Sensory Interventions: Assess changes in LOC, Float heels Moisture Interventions: Check for incontinence Q2 hours and as needed, Internal/External fecal devices, Internal/External urinary devices Activity Interventions: Assess need for specialty bed Mobility Interventions: Assess need for specialty bed Nutrition Interventions: Document food/fluid/supplement intake Friction and Shear Interventions: HOB 30 degrees or less, Minimize layers

## 2019-02-28 NOTE — PROGRESS NOTES
Pharmacy Dosing Services: Vancomycin Indication: Sepsis (recently admitted) Day of therapy: 2 (vanc + zosyn x 3 days on previous admission) Other Antimicrobials (Include dose, start day & day of therapy): 
Pip/tazo 4.5 gm IV every 8 hours extended infusion Bactrim SS PO daily for HAP (stenotrophomonas) Loading dose (date given): 1500 mg 
Current Maintenance dose: 1000 mg IV every 8 hours Goal Vancomycin Level: 15-20 
(Trough 15-20 for most infections, 20 for meningitis/osteomyelitis, pre-HD level ~25) Vancomycin Level (if drawn):  
 - 18.7 (10.4 hrs post dose) Significant Cultures:  
 - blood culture - pending  - urine culture - >100K yeast  
 
Renal function stable? (unstable defined as SCr increase of 0.5 mg/dL or > 50% increase from baseline, whichever is greater) (Y/N): N  
(Quadraplegia) CAPD, Hemodialysis or Renal Replacement Therapy (Y/N): N Recent Labs  
  19 
0400 19 
1025 19 
2330 CREA 0.68 0.84 1.26  
BUN 6* 9 16 WBC 8.2 9.5 9.7 Temp (24hrs), Av.7 °F (37.1 °C), Min:98.1 °F (36.7 °C), Max:99.7 °F (37.6 °C) Creatinine Clearance (Creatinine Clearance (ml/min)): 116.8 ml/min Regimen assessment:  
- 12hr extrapolated level therapeutic at 16.2 
- Will continue current maintenance dose Maintenance dose: 1000 mg IV every 12 hours Next scheduled level: Trough on 3/2 at 1530 Pharmacy will follow daily and adjust medications as appropriate for renal function and/or serum levels. Thank you, Kalpana Otoole

## 2019-03-01 ENCOUNTER — APPOINTMENT (OUTPATIENT)
Dept: GENERAL RADIOLOGY | Age: 39
DRG: 870 | End: 2019-03-01
Attending: NURSE PRACTITIONER
Payer: MEDICARE

## 2019-03-01 PROBLEM — E44.0 MODERATE MALNUTRITION (HCC): Status: ACTIVE | Noted: 2019-03-01

## 2019-03-01 PROBLEM — R41.89 UNRESPONSIVENESS: Status: ACTIVE | Noted: 2019-03-01

## 2019-03-01 PROBLEM — G93.41 METABOLIC ENCEPHALOPATHY: Status: ACTIVE | Noted: 2019-03-01

## 2019-03-01 LAB
ANION GAP SERPL CALC-SCNC: 7 MMOL/L (ref 3–18)
BACTERIA SPEC CULT: NORMAL
BACTERIA SPEC CULT: NORMAL
BASOPHILS # BLD: 0 K/UL (ref 0–0.1)
BASOPHILS NFR BLD: 0 % (ref 0–2)
BUN SERPL-MCNC: 8 MG/DL (ref 7–18)
BUN/CREAT SERPL: 10 (ref 12–20)
CA-I SERPL-SCNC: 1.23 MMOL/L (ref 1.12–1.32)
CALCIUM SERPL-MCNC: 8.7 MG/DL (ref 8.5–10.1)
CHLORIDE SERPL-SCNC: 105 MMOL/L (ref 100–108)
CO2 SERPL-SCNC: 27 MMOL/L (ref 21–32)
CREAT SERPL-MCNC: 0.78 MG/DL (ref 0.6–1.3)
DIFFERENTIAL METHOD BLD: ABNORMAL
EOSINOPHIL # BLD: 0.4 K/UL (ref 0–0.4)
EOSINOPHIL NFR BLD: 5 % (ref 0–5)
ERYTHROCYTE [DISTWIDTH] IN BLOOD BY AUTOMATED COUNT: 17.2 % (ref 11.6–14.5)
GLUCOSE BLD STRIP.AUTO-MCNC: 109 MG/DL (ref 70–110)
GLUCOSE BLD STRIP.AUTO-MCNC: 148 MG/DL (ref 70–110)
GLUCOSE BLD STRIP.AUTO-MCNC: 185 MG/DL (ref 70–110)
GLUCOSE BLD STRIP.AUTO-MCNC: 99 MG/DL (ref 70–110)
GLUCOSE SERPL-MCNC: 114 MG/DL (ref 74–99)
HCT VFR BLD AUTO: 32.3 % (ref 36–48)
HGB BLD-MCNC: 9.6 G/DL (ref 13–16)
LYMPHOCYTES # BLD: 1 K/UL (ref 0.9–3.6)
LYMPHOCYTES NFR BLD: 13 % (ref 21–52)
MAGNESIUM SERPL-MCNC: 1.8 MG/DL (ref 1.6–2.6)
MCH RBC QN AUTO: 24 PG (ref 24–34)
MCHC RBC AUTO-ENTMCNC: 29.7 G/DL (ref 31–37)
MCV RBC AUTO: 80.8 FL (ref 74–97)
MONOCYTES # BLD: 1 K/UL (ref 0.05–1.2)
MONOCYTES NFR BLD: 13 % (ref 3–10)
NEUTS SEG # BLD: 5.2 K/UL (ref 1.8–8)
NEUTS SEG NFR BLD: 69 % (ref 40–73)
PHOSPHATE SERPL-MCNC: 2.2 MG/DL (ref 2.5–4.9)
PLATELET # BLD AUTO: 379 K/UL (ref 135–420)
PMV BLD AUTO: 9.6 FL (ref 9.2–11.8)
POTASSIUM SERPL-SCNC: 3.8 MMOL/L (ref 3.5–5.5)
RBC # BLD AUTO: 4 M/UL (ref 4.7–5.5)
RBC MORPH BLD: ABNORMAL
SERVICE CMNT-IMP: NORMAL
SERVICE CMNT-IMP: NORMAL
SODIUM SERPL-SCNC: 139 MMOL/L (ref 136–145)
WBC # BLD AUTO: 7.6 K/UL (ref 4.6–13.2)

## 2019-03-01 PROCEDURE — 74011000258 HC RX REV CODE- 258: Performed by: HOSPITALIST

## 2019-03-01 PROCEDURE — 94640 AIRWAY INHALATION TREATMENT: CPT

## 2019-03-01 PROCEDURE — 77030037878 HC DRSG MEPILEX >48IN BORD MOLN -B

## 2019-03-01 PROCEDURE — 74011000250 HC RX REV CODE- 250: Performed by: FAMILY MEDICINE

## 2019-03-01 PROCEDURE — 74011250637 HC RX REV CODE- 250/637: Performed by: FAMILY MEDICINE

## 2019-03-01 PROCEDURE — 74011250636 HC RX REV CODE- 250/636: Performed by: HOSPITALIST

## 2019-03-01 PROCEDURE — 82962 GLUCOSE BLOOD TEST: CPT

## 2019-03-01 PROCEDURE — 36415 COLL VENOUS BLD VENIPUNCTURE: CPT

## 2019-03-01 PROCEDURE — 5A1955Z RESPIRATORY VENTILATION, GREATER THAN 96 CONSECUTIVE HOURS: ICD-10-PCS | Performed by: HOSPITALIST

## 2019-03-01 PROCEDURE — 74011250637 HC RX REV CODE- 250/637: Performed by: INTERNAL MEDICINE

## 2019-03-01 PROCEDURE — 85025 COMPLETE CBC W/AUTO DIFF WBC: CPT

## 2019-03-01 PROCEDURE — 89220 SPUTUM SPECIMEN COLLECTION: CPT

## 2019-03-01 PROCEDURE — 83735 ASSAY OF MAGNESIUM: CPT

## 2019-03-01 PROCEDURE — 80048 BASIC METABOLIC PNL TOTAL CA: CPT

## 2019-03-01 PROCEDURE — 99218 HC RM OBSERVATION: CPT

## 2019-03-01 PROCEDURE — 71045 X-RAY EXAM CHEST 1 VIEW: CPT

## 2019-03-01 PROCEDURE — 74011250636 HC RX REV CODE- 250/636: Performed by: NURSE PRACTITIONER

## 2019-03-01 PROCEDURE — 74011636637 HC RX REV CODE- 636/637: Performed by: FAMILY MEDICINE

## 2019-03-01 PROCEDURE — 82330 ASSAY OF CALCIUM: CPT

## 2019-03-01 PROCEDURE — 65610000006 HC RM INTENSIVE CARE

## 2019-03-01 PROCEDURE — 84100 ASSAY OF PHOSPHORUS: CPT

## 2019-03-01 PROCEDURE — 77030010547 HC BG URIN W/UMETER -A

## 2019-03-01 PROCEDURE — 77010033678 HC OXYGEN DAILY

## 2019-03-01 RX ORDER — DIPHENHYDRAMINE HCL 25 MG
25 CAPSULE ORAL
Status: DISCONTINUED | OUTPATIENT
Start: 2019-03-01 | End: 2019-03-13 | Stop reason: HOSPADM

## 2019-03-01 RX ADMIN — MIDODRINE HYDROCHLORIDE 5 MG: 2.5 TABLET ORAL at 16:12

## 2019-03-01 RX ADMIN — MIDODRINE HYDROCHLORIDE 5 MG: 2.5 TABLET ORAL at 09:55

## 2019-03-01 RX ADMIN — DULOXETINE 60 MG: 60 CAPSULE, DELAYED RELEASE ORAL at 09:00

## 2019-03-01 RX ADMIN — IPRATROPIUM BROMIDE AND ALBUTEROL SULFATE 3 ML: .5; 3 SOLUTION RESPIRATORY (INHALATION) at 07:44

## 2019-03-01 RX ADMIN — PANTOPRAZOLE SODIUM 20 MG: 20 TABLET, DELAYED RELEASE ORAL at 09:55

## 2019-03-01 RX ADMIN — GABAPENTIN 600 MG: 300 CAPSULE ORAL at 16:13

## 2019-03-01 RX ADMIN — MIRTAZAPINE 7.5 MG: 15 TABLET, FILM COATED ORAL at 21:35

## 2019-03-01 RX ADMIN — INSULIN LISPRO 2 UNITS: 100 INJECTION, SOLUTION INTRAVENOUS; SUBCUTANEOUS at 12:45

## 2019-03-01 RX ADMIN — CYCLOBENZAPRINE HYDROCHLORIDE 10 MG: 10 TABLET, FILM COATED ORAL at 18:27

## 2019-03-01 RX ADMIN — IPRATROPIUM BROMIDE AND ALBUTEROL SULFATE 3 ML: .5; 3 SOLUTION RESPIRATORY (INHALATION) at 11:20

## 2019-03-01 RX ADMIN — SODIUM CHLORIDE AND POTASSIUM CHLORIDE: 9; 1.49 INJECTION, SOLUTION INTRAVENOUS at 16:12

## 2019-03-01 RX ADMIN — BACLOFEN 10 MG: 10 TABLET ORAL at 18:27

## 2019-03-01 RX ADMIN — ACETYLCYSTEINE 400 MG: 100 SOLUTION ORAL; RESPIRATORY (INHALATION) at 07:44

## 2019-03-01 RX ADMIN — SODIUM CHLORIDE 1000 MG: 900 INJECTION, SOLUTION INTRAVENOUS at 04:00

## 2019-03-01 RX ADMIN — MIDODRINE HYDROCHLORIDE 5 MG: 2.5 TABLET ORAL at 12:26

## 2019-03-01 RX ADMIN — ACETYLCYSTEINE 400 MG: 100 SOLUTION ORAL; RESPIRATORY (INHALATION) at 20:15

## 2019-03-01 RX ADMIN — IPRATROPIUM BROMIDE AND ALBUTEROL SULFATE 3 ML: .5; 3 SOLUTION RESPIRATORY (INHALATION) at 00:50

## 2019-03-01 RX ADMIN — MULTIVITAMIN TABLET 1 TABLET: TABLET at 09:55

## 2019-03-01 RX ADMIN — IPRATROPIUM BROMIDE AND ALBUTEROL SULFATE 3 ML: .5; 3 SOLUTION RESPIRATORY (INHALATION) at 04:18

## 2019-03-01 RX ADMIN — PIPERACILLIN SODIUM,TAZOBACTAM SODIUM 4.5 G: 4; .5 INJECTION, POWDER, FOR SOLUTION INTRAVENOUS at 04:05

## 2019-03-01 RX ADMIN — ACETYLCYSTEINE 400 MG: 100 SOLUTION ORAL; RESPIRATORY (INHALATION) at 00:50

## 2019-03-01 RX ADMIN — IPRATROPIUM BROMIDE AND ALBUTEROL SULFATE 3 ML: .5; 3 SOLUTION RESPIRATORY (INHALATION) at 20:15

## 2019-03-01 RX ADMIN — BUPROPION HYDROCHLORIDE 150 MG: 150 TABLET, EXTENDED RELEASE ORAL at 09:56

## 2019-03-01 RX ADMIN — LACTOBACILLUS TAB 1 TABLET: TAB at 09:56

## 2019-03-01 RX ADMIN — CYCLOBENZAPRINE HYDROCHLORIDE 10 MG: 10 TABLET, FILM COATED ORAL at 01:24

## 2019-03-01 RX ADMIN — GABAPENTIN 600 MG: 300 CAPSULE ORAL at 21:33

## 2019-03-01 RX ADMIN — PIPERACILLIN SODIUM,TAZOBACTAM SODIUM 4.5 G: 4; .5 INJECTION, POWDER, FOR SOLUTION INTRAVENOUS at 12:26

## 2019-03-01 RX ADMIN — BACLOFEN 10 MG: 10 TABLET ORAL at 09:56

## 2019-03-01 RX ADMIN — LEVETIRACETAM 500 MG: 500 TABLET, EXTENDED RELEASE ORAL at 09:56

## 2019-03-01 RX ADMIN — SULFAMETHOXAZOLE AND TRIMETHOPRIM 1 TABLET: 400; 80 TABLET ORAL at 21:37

## 2019-03-01 RX ADMIN — SULFAMETHOXAZOLE AND TRIMETHOPRIM 1 TABLET: 400; 80 TABLET ORAL at 09:56

## 2019-03-01 RX ADMIN — PIPERACILLIN SODIUM,TAZOBACTAM SODIUM 4.5 G: 4; .5 INJECTION, POWDER, FOR SOLUTION INTRAVENOUS at 18:27

## 2019-03-01 RX ADMIN — GABAPENTIN 600 MG: 300 CAPSULE ORAL at 09:56

## 2019-03-01 NOTE — PROGRESS NOTES
Progress Note Patient: Lisha Lewis               Sex: male          DOA: 2/26/2019 YOB: 1980      Age:  34 y.o.        LOS:  LOS: 0 days CHIEF COMPLAINT:  Unresponsive Subjective:  
 
Pt seen and examined at bedside No major events overnight Pt offers no complaints this AM 
Denies CP or SOB Denies abd pain Pt said he feels he is getting better Objective:  
  
Visit Vitals /78 Pulse 77 Temp 97.8 °F (36.6 °C) Resp 15 Ht 5' 7\" (1.702 m) Wt 60.3 kg (133 lb) SpO2 99% BMI 20.83 kg/m² Physical Exam: 
Physical Exam  
Constitutional: He is oriented to person, place, and time and well-developed, well-nourished, and in no distress. HENT:  
Head: Normocephalic and atraumatic. Eyes: Conjunctivae and EOM are normal. Pupils are equal, round, and reactive to light. Neck: Normal range of motion. +Tracheostomy with collar Cardiovascular: Normal rate, regular rhythm and normal heart sounds. Pulmonary/Chest: Effort normal.  
+coarse breath sounds Abdominal: Soft. Bowel sounds are normal.  
Musculoskeletal: Normal range of motion. Neurological: He is alert and oriented to person, place, and time. Skin: Skin is warm and dry. Nursing note and vitals reviewed. Lab/Data Reviewed: 
BMP:  
Lab Results Component Value Date/Time  03/01/2019 05:00 AM  
 K 3.8 03/01/2019 05:00 AM  
  03/01/2019 05:00 AM  
 CO2 27 03/01/2019 05:00 AM  
 AGAP 7 03/01/2019 05:00 AM  
  (H) 03/01/2019 05:00 AM  
 BUN 8 03/01/2019 05:00 AM  
 CREA 0.78 03/01/2019 05:00 AM  
 GFRAA >60 03/01/2019 05:00 AM  
 GFRNA >60 03/01/2019 05:00 AM  
 
CBC:  
Lab Results Component Value Date/Time WBC 7.6 03/01/2019 05:00 AM  
 HGB 9.6 (L) 03/01/2019 05:00 AM  
 HCT 32.3 (L) 03/01/2019 05:00 AM  
  03/01/2019 05:00 AM  
 
 
Imaging Reviewed: 
[facility]se Castro Chest Kari Mings Result Date: 3/1/2019 Chest, single view Indication: Respiratory distress Comparison: Several prior exams, most recently 2/28/2019 Findings:  Portable upright AP view of the chest was obtained. Tracheostomy catheter present with tip projecting just over the thoracic inlet. Persistent medial left retrocardiac density and right sided retrocardiac densities without significant change in appearance. Faint opacity over the right upper lobe unchanged. No pneumothorax or pleural effusion. Stable cardiac size and mediastinal contours. No acute osseous abnormality. Impression: Combination of atelectasis and airspace disease at the lung bases and right upper lobe without significant change in appearance from prior days exam. 
 
 
Assessment:  
 
Principal Problem: 
  Unresponsive episode (2/27/2019) Active Problems: Hypotension (2/27/2019) Moderate malnutrition ([facility] [address] (3/1/2019) Metabolic encephalopathy (9/3/4961) PLAN: 
· Appreciate PCCM assistance, cont antibiotics for likely aspiration PNA · SLP consulted · Cont acceptable home medications for chronic conditions · DVT protocol I have personally reviewed all pertinent labs and films that have officially resulted over the last 24 hours. I have personally checked for all pending labs that are awaiting final results. Signed By: Dipak Fairchild MD   
 March 1, 2019

## 2019-03-01 NOTE — ASSESSMENT & PLAN NOTE
Risk:Pt is underweight related to inadequate caloric intake as evidenced by 85% ideal weight and BMI= 19.7kg/m2 Pt with increased calorie and protein requirements related to multiple stage 3 and one stage 4 sacral ulcers.

## 2019-03-01 NOTE — CDMP QUERY
Please clarify if this patient is being treated/managed for: 
 
=>Moderate Malnutrition =>Other Explanation of clinical findings =>Unable to Determine (no explanation of clinical findings) The medical record reflects the following: 
 
Risk:Pt is underweight related to inadequate caloric intake as evidenced by 85% ideal weight and BMI= 19.7kg/m2 Pt with increased calorie and protein requirements related to multiple stage 3 and one stage 4 sacral ulcers. Clinical Indicators: Moderate Malnutrition, as evidenced by: 
                Mild muscle wasting, loss of subcutaneous fat Nutritional intake <75% of recommended intake for >1 month Treatment:  Ensure Enlive Supplements to provide additional calories and protein. RD- Continue inpatient monitoring and intervention. Please clarify and document your clinical opinion in the progress notes and discharge summary. Thank you, [address] [facility],[address],  [address]

## 2019-03-01 NOTE — MANAGEMENT PLAN
Discharge/Transition Planning Pt will return to [facility] when medically stable. I have asked a speciality, low flow mattress bed be ordered and cough assist device. Wilbur Coto RN BSN Outcomes Manager Pager # [phone]

## 2019-03-01 NOTE — PROGRESS NOTES
Pulmonary progress note History 34year-old quadriplegic presented to the emergency room on 2/26/19 secondary to unresponsiveness. Chest CT showed bilateral lower lobe infiltrates left greater than right with a subtle right upper lobe infiltrate. Sputum collected on 2/23/19 grew Stenotrophomonas and Providencia. The patient's antibiotics were changed from Levaquin to Bactrim. Vancomycin was discontinued, and Zosyn was continued. Sputum production has decreased. He is tolerating CoughAssist 
 
Exam 
He is quadriplegic. He can speak understandably Blood pressure 110/73, pulse 94, temperature 97.4 °F (36.3 °C), resp. rate 16, height 5' 7\" (1.702 m), weight 60.3 kg (133 lb), SpO2 100 %. Gaze is conjugate. Extraocular muscles are intact. No drainage from tracheostomy tube site Lungs are clear Heart has a regular rate and rhythm Abdomen is nondistended Spastic tremors of the extremities with suctioning No significant edema Stenotrophomonas is sensitive to Bactrim. Providencia is susceptible to Zosyn, third generation cephalosporins and carbopenems. There is no oral or nebulized therapies that will be clearly effective against the Providencia WBC is normal and there are no bands. Creatinine 0.78, calcium 8.7, phosphorus 2.2 and magnesium 1.8. Subtle improvement in the retrocardiac density. The right lower lobe appears unchanged Assessment Quadriplegic with chronic vent dependency Bilateral lower lobe infiltrates with subtle right upper lobe infiltrate Stenotrophomonas in Providencia positive sputum now on Bactrim and Zosyn Plan Continue Bactrim for 7 days Discontinue vancomycin Zosyn for minimum of 5 days. Continue CoughAssist device Continued nebs therapies as necessary Electrolyte replacement scales

## 2019-03-01 NOTE — PROGRESS NOTES
Problem: Falls - Risk of 
Goal: *Absence of Falls Document Woodrow Craft Fall Risk and appropriate interventions in the flowsheet. Outcome: Progressing Towards Goal 
Fall Risk Interventions: 
  
 
Mentation Interventions: Bed/chair exit alarm Medication Interventions: Bed/chair exit alarm Elimination Interventions: Bed/chair exit alarm Problem: Pressure Injury - Risk of 
Goal: *Prevention of pressure injury Document Sarwat Scale and appropriate interventions in the flowsheet. Outcome: Progressing Towards Goal 
Pressure Injury Interventions: 
Sensory Interventions: Assess need for specialty bed, Minimize linen layers, Keep linens dry and wrinkle-free Moisture Interventions: Check for incontinence Q2 hours and as needed Activity Interventions: Assess need for specialty bed Mobility Interventions: Pressure redistribution bed/mattress (bed type), Turn and reposition approx. every two hours(pillow and wedges), HOB 30 degrees or less Nutrition Interventions: Document food/fluid/supplement intake Friction and Shear Interventions: HOB 30 degrees or less, Lift sheet Problem: Impaired Skin Integrity/Pressure Injury Treatment Goal: *Prevention of pressure injury Document Sarwat Scale and appropriate interventions in the flowsheet. Outcome: Progressing Towards Goal 
Pressure Injury Interventions: 
Sensory Interventions: Assess need for specialty bed, Minimize linen layers, Keep linens dry and wrinkle-free Moisture Interventions: Check for incontinence Q2 hours and as needed Activity Interventions: Assess need for specialty bed Mobility Interventions: Pressure redistribution bed/mattress (bed type), Turn and reposition approx. every two hours(pillow and wedges), HOB 30 degrees or less Nutrition Interventions: Document food/fluid/supplement intake Friction and Shear Interventions: HOB 30 degrees or less, Lift sheet

## 2019-03-01 NOTE — PROGRESS NOTES
Cough assist given, I:40/E:40 5 breaths x 3 cycles. Suctioned mod amount, thick white secretions. HR increased to the 120's after initiating therapy, but went back down to the low 90's after therapy. All other vitals remained stable throughout therapy.

## 2019-03-01 NOTE — CDMP QUERY
Dx of Acute Encephalopathy per [facility]. If you concur with this dx could you please further specify dx of encephalopathy. Please clarify if  this patient is being treated/managed for: 
 
=>Metabolic Encephalopathy 
=>Other Explanation of clinical findings =>Unable to Determine (no explanation of clinical findings) The medical record reflects the following: 
 
Risk: Hypotension, aspiration pneumonia,  ARF Clinical Indicators:2/26- ED- was noted to have ams per nh. Upon ems arrival pt was noted to be hypotensive, alert to voice. ED NN- EMS from [facility] for chief c/o Unresponsive. Patient withdrawals from pain, trach in place. According to EMS patient normally speaks and is oriented. Treatment: Abx tx, IVF, ICU Please clarify and document your clinical opinion in the progress notes and discharge summary. Thank you, [address] [facility], [address]

## 2019-03-01 NOTE — PROGRESS NOTES
[facility] ICU Progress Note Name: Loan Smith : 1980 MRN: [id number] Date: 3/1/2019 3:07 PM 
  
[]I have reviewed the flowsheet and previous days notes. Events overnight reviewed and discussed with nursing staff. Vital signs and records reviewed. Subjective: 
19: 
 
- No new events overnight. *** 
 
[]The patient is unable to give any meaningful history or review of systems because the patient is: 
[]Intubated []Sedated  
[]Unresponsive []The patient is critically ill on     
[]Mechanical ventilation []Pressors []BiPAP [] ROS:{Review of Systems:00935::\"Review of systems not obtained due to patient factors. \"} Medication Review: · Pressors - None · Sedation - None · Antibiotics - None · Pain - Fentanyl Patch · GI/ DVT - Protonix · Others (other gtts) Vital Signs:   
Visit Vitals /73 Pulse 94 Temp 97.4 °F (36.3 °C) Resp 16 Ht 5' 7\" (1.702 m) Wt 60.3 kg (133 lb) SpO2 100% BMI 20.83 kg/m² O2 Device: Tracheal collar O2 Flow Rate (L/min): 8 l/min Temp (24hrs), Av °F (36.7 °C), Min:97.4 °F (36.3 °C), Max:98.9 °F (37.2 °C) Intake/Output:  
Last shift:       07 -  190 In: 980 [P.O.:480; I.V.:500] Out: 400 [Urine:100] Last 3 shifts: 1901 -  0700 In: 3973.3 [P.O.:1020; I.V.:2953.3] Out: 3125 [Urine:2525] Intake/Output Summary (Last 24 hours) at 3/1/2019 1507 Last data filed at 3/1/2019 1500 Gross per 24 hour Intake 2620 ml Output 1325 ml Net 1295 ml Ventilator Settings: 
Ventilator Mode: Assist control, VC+ Respiratory Rate Back-Up Rate: 14 Insp Time (sec): 1 sec Ventilator Volumes Vt Set (ml): 450 ml Vt Exhaled (Machine Breath) (ml): 328 ml Ve Observed (l/min): 5.7 l/min Ventilator Pressures PIP Observed (cm H2O): 15 cm H2O 
MAP (cm H2O): 7.6 PEEP/VENT (cm H2O): 5 cm H20 Physical Exam: 
 
Exam 
 He is quadriplegic. He can speak understandably Blood pressure (!) 168/104, pulse 96, temperature 99.7 °F (37.6 °C), resp. rate 22, height 5' 7\" (1.702 m), weight 57.2 kg (126 lb), SpO2 100 %. Gaze is conjugate. Extraocular muscles are intact. No drainage from tracheostomy tube site Lungs are clear Heart has a regular rate and rhythm Abdomen is nondistended Spastic tremors of the extremities with suctioning No significant edema DATA:  
 
Current Facility-Administered Medications Medication Dose Route Frequency  0.9% sodium chloride with KCl 20 mEq/L infusion   IntraVENous CONTINUOUS  cyclobenzaprine (FLEXERIL) tablet 10 mg  10 mg Oral TID PRN  
 midodrine (PROAMITINE) tablet 5 mg  5 mg Oral TID WITH MEALS  
 acetaminophen (TYLENOL) tablet 650 mg  650 mg Oral Q4H PRN  
 baclofen (LIORESAL) tablet 10 mg  10 mg Oral BID  buPROPion SR (WELLBUTRIN SR) tablet 150 mg  150 mg Oral DAILY  DULoxetine (CYMBALTA) capsule 60 mg  60 mg Oral DAILY  fentaNYL (DURAGESIC) 25 mcg/hr patch 1 Patch  1 Patch TransDERmal Q72H  
 gabapentin (NEURONTIN) capsule 600 mg  600 mg Oral TID  Lactobacillus Acidoph & Bulgar [facility]) tablet 1 Tab  1 Tab Oral DAILY  levETIRAcetam (KEPPRA XR) ER tablet 500 mg  500 mg Oral DAILY  mirtazapine (REMERON) tablet 7.5 mg  7.5 mg Oral QHS  multivitamin (ONE A DAY) tablet 1 Tab  1 Tab Oral DAILY  pantoprazole (PROTONIX) tablet 20 mg  20 mg Oral DAILY  ondansetron (ZOFRAN) injection 4 mg  4 mg IntraVENous Q4H PRN  
 insulin lispro (HUMALOG) injection   SubCUTAneous AC&HS  
 glucose chewable tablet 16 g  4 Tab Oral PRN  
 glucagon (GLUCAGEN) injection 1 mg  1 mg IntraMUSCular PRN  
 dextrose (D50) infusion 12.5-25 g  25-50 mL IntraVENous PRN  
 albuterol-ipratropium (DUO-NEB) 2.5 MG-0.5 MG/3 ML  3 mL Nebulization Q4H RT  
 acetylcysteine (MUCOMYST) 100 mg/mL (10 %) nebulizer solution 400 mg  4 mL Nebulization Q8H RT  
  piperacillin-tazobactam (ZOSYN) 4.5 g in 0.9% sodium chloride (MBP/ADV) 100 mL MBP  #EXTENDED 4-HOUR INFUSION##  4.5 g IntraVENous Q8H  
 trimethoprim-sulfamethoxazole (BACTRIM, SEPTRA)  mg per tablet 1 Tab  1 Tab Oral Q12H  
 sodium chloride (NS) flush 5-10 mL  5-10 mL IntraVENous PRN Labs: Results:  
   
Chemistry Recent Labs 03/01/19 
0500 02/28/19 
0400 02/27/19 
1025 02/26/19 
2330 * 82 96 110*  141 145 132* K 3.8 3.7 3.6 3.1*  
 109* 111* 94* CO2 27 27 27 28 BUN 8 6* 9 16 CREA 0.78 0.68 0.84 1.26  
CA 8.7 8.0* 7.5* 7.7* AGAP 7 5 7 10 BUCR 10* 9* 11* 13  
AP  --   --   --  99  
TP  --   --   --  6.6 ALB  --   --   --  2.8*  
GLOB  --   --   --  3.8 AGRAT  --   --   --  0.7* CBC w/Diff Recent Labs 03/01/19 
0500 02/28/19 
0400 02/27/19 
1025 WBC 7.6 8.2 9.5  
RBC 4.00* 3.61* 3.68* HGB 9.6* 8.9* 9.1*  
HCT 32.3* 29.4* 30.3*  327 316 GRANS 69 73 78* LYMPH 13* 16* 9*  
EOS 5 4 1 Coagulation No results for input(s): PTP, INR, APTT in the last 72 hours. No lab exists for component: INREXT Liver Enzymes Recent Labs  
  02/26/19 
2330 TP 6.6 ALB 2.8* AP 99 SGOT 14* ABG No results found for: PH, PHI, PCO2, PCO2I, PO2, PO2I, HCO3, HCO3I, FIO2, FIO2I Microbiology Recent Labs  
  02/28/19 
1339 02/27/19 
1031 02/26/19 
2359 CULT MRSA target DNA is not detected (presumptive not colonized with MRSA) RARE GRAM NEGATIVE RODS*  RARE NORMAL RESPIRATORY GARY >100,000 COLONIES/mL YEAST* Telemetry: []Sinus []A-flutter []Paced []A-fib []Multiple PVCs Imaging: CXR [date]: CT HEAD/CHEST/ABD/PELVIS [date]: 
[]I have personally reviewed the patients radiographs []Radiographs reviewed with radiologist 
 []No change from prior, tubes and lines in adequate position []Improved   []Worsening IMPRESSION:  
 
· Quadriplegic with chronic vent dependency · Bilateral lower lobe infiltrates with subtle right upper lobe infiltrate likely representing pneumonia and cannot exclude aspiration · Stenotrophomonas positive sputum now on Bactrim · Acute Encephalopathy- likely 2/2 to above- Resolved · SONDRA likely due to above- Resolved · Electrolyte derangements 
  
Hx: 
· Quadriplegia · Tracheostomy- Arrived with sz. 6 cuffless trach, trach changed to sz 6 Fenestrated trach · Cecostomy · DM 
  
RECOMMENDATIONS:  
· Resp: Titrate FiO2/ supp O2 for SpO2 >90%; · I/D: Afebrile; aleukocytosis; Sepsis bundle per hospital protocol, f/u BxCx/UC, Sputum Cx's. LA ordered- initial and repeat Q4hrs till normalized. ABX :*** Deescalate ABX once Cx's finalize. · Hem/Onc: Daily CBC; H/H, and plts are stable · CVS: HD stable; Monitor CVP, Actively titrate vasopressors aim MAP >65mmHg, Check CE'sl, ECHO results. · Metabolic: Daily BMP; monitor e-lytes; replace PRN 
· Renal: Trend Renal indices; Diuresis, Cabrera to BSD, · Endocrine: POC Glucose q6; Check TSH level · GI: SUP, Trend LFTs, Zofran PRN for N/V  
· Musc/Skin: No acute issues, wound care · Neuro: PRN pain medications, +/- sedation · Fluids: *** 
· Discussed in Interdisciplinary Rounds Best Practices/ Safety Bundles: 
· Sepsis Bundle per Hospital Protocol · CAUTI Bundle · Electrolyte Replacement Bundle · Glycemic control goal <180; avoid Hypoglycemia · IHI ICU Bundles: ·  Cabrera Bundle Followed · Mech Vent patients: · VAP bundle, Aim to keep peak plateau pressure [id number] H2O 
· Aspiration Precautions - HOB >30' · Daily sedation holiday as indicated · SBT as tolerated/appropriate · Titrate FiO2 for SpO2 >94% · Aggressive Pulmonary Hygeiene · Stress ulcer prophylaxis. Protonix · DVT prophylaxis. · Need for Lines, cabrera assessed. · Restraints need. · Palliative care evaluation. The patient is: [x] acutely ill Risk of deterioration: [x] moderate  
 [] critically ill  [] high []See my orders for details My assessment/plan was discussed with: 
[x]Nursing []PT/OT [x]Respiratory therapy [x]Dr. Rory James []Family [] Angie Mckee NP-BC Pulmonary, Critical Care Medicine [facility]

## 2019-03-01 NOTE — PROGRESS NOTES
2000 Rec'd pt resting in bed, currently denies pain. Continuing to turn and reposiiton Q 1-2 hour. Attempted to replace condom catheter after found was off. B/P 051-337 systolic-changed out cuff/repositioned and will continue to monitor. 2100  Notified supervisor that pt has no call light (due to [address]) -maintenance notified. 2130-  Updated Dr. Sergey Rico of pt status, labile b/p but mostly running higher side, sys 170 at times. Also updated pt having spasms to all ext (which is normal for pt baseline) but does not have flexeril ordered which pt takes tid normally for spasms. New orders rec'd including adjustment for midodrine. 2300  Maintenance placed new call light for pt due to pts status/quad-now will be able to turn head to call. 0100-200  Pt bath completed, dressing change completed to sacrum decub's. Reviewed last administration of fentanyl patch - which was ordered but not started on the 27th. Notiifed pharmacy and started patch 0100. New colostomy bag placed./ site pink WNL. 0400  B/P stabilized averaging 400 range systolically. SCD's placed.

## 2019-03-01 NOTE — PROGRESS NOTES
Cough assist given, I:40/E:40 5 breaths x 3 cycles. Suctioned small amount, thick white secretions. HR increased to 120's at the beginning of therapy, but stabilized back down to mid to upper 80's after therapy. No resp distress or complications noted.

## 2019-03-01 NOTE — PROGRESS NOTES
0700: Report received from Agapito Foster, [facility]. Pt is resting in bed, trach collar at 8L/min, VSS. 0800: RT in room performing cough assist with patient 
0900: RN assists pt with breakfast.  Ate 100% plus an ensure. Tolerated well, VSS. 1000: Rounded on pt with care team  
1100: Incontinence care performed, new condom catheter applied. 1300: Pt family in the room assist pt with lunch. Pt ate 100%, entire ensure. 1600: Visitors in the room, pt in good spirits. 1800:  Pt left eye is swollen, red, pt says it is itching and has happened before on antibiotics, he can't remember which ones. NP aware. RN applied warm compress. 1900: Bedside and Verbal shift change report given to Agapito Foster RN (oncoming nurse) by Mackenzie Pearce 
 (offgoing nurse). Report included the following information SBAR, Kardex, Procedure Summary, Intake/Output, MAR and Cardiac Rhythm NSR.

## 2019-03-02 LAB
ANION GAP SERPL CALC-SCNC: 7 MMOL/L (ref 3–18)
BACTERIA SPEC CULT: ABNORMAL
BASOPHILS # BLD: 0 K/UL (ref 0–0.1)
BASOPHILS NFR BLD: 0 % (ref 0–2)
BUN SERPL-MCNC: 13 MG/DL (ref 7–18)
BUN/CREAT SERPL: 15 (ref 12–20)
CA-I SERPL-SCNC: 1.11 MMOL/L (ref 1.12–1.32)
CALCIUM SERPL-MCNC: 8.5 MG/DL (ref 8.5–10.1)
CHLORIDE SERPL-SCNC: 107 MMOL/L (ref 100–108)
CO2 SERPL-SCNC: 25 MMOL/L (ref 21–32)
CREAT SERPL-MCNC: 0.84 MG/DL (ref 0.6–1.3)
DIFFERENTIAL METHOD BLD: ABNORMAL
EOSINOPHIL # BLD: 0.7 K/UL (ref 0–0.4)
EOSINOPHIL NFR BLD: 9 % (ref 0–5)
ERYTHROCYTE [DISTWIDTH] IN BLOOD BY AUTOMATED COUNT: 17.4 % (ref 11.6–14.5)
GLUCOSE BLD STRIP.AUTO-MCNC: 106 MG/DL (ref 70–110)
GLUCOSE BLD STRIP.AUTO-MCNC: 120 MG/DL (ref 70–110)
GLUCOSE BLD STRIP.AUTO-MCNC: 123 MG/DL (ref 70–110)
GLUCOSE BLD STRIP.AUTO-MCNC: 152 MG/DL (ref 70–110)
GLUCOSE SERPL-MCNC: 153 MG/DL (ref 74–99)
GRAM STN SPEC: ABNORMAL
GRAM STN SPEC: ABNORMAL
HCT VFR BLD AUTO: 30.6 % (ref 36–48)
HGB BLD-MCNC: 9.2 G/DL (ref 13–16)
LYMPHOCYTES # BLD: 1.8 K/UL (ref 0.9–3.6)
LYMPHOCYTES NFR BLD: 24 % (ref 21–52)
MAGNESIUM SERPL-MCNC: 1.7 MG/DL (ref 1.6–2.6)
MCH RBC QN AUTO: 24.7 PG (ref 24–34)
MCHC RBC AUTO-ENTMCNC: 30.1 G/DL (ref 31–37)
MCV RBC AUTO: 82.3 FL (ref 74–97)
MONOCYTES # BLD: 0.5 K/UL (ref 0.05–1.2)
MONOCYTES NFR BLD: 7 % (ref 3–10)
NEUTS SEG # BLD: 4.5 K/UL (ref 1.8–8)
NEUTS SEG NFR BLD: 60 % (ref 40–73)
PHOSPHATE SERPL-MCNC: 2.4 MG/DL (ref 2.5–4.9)
PLATELET # BLD AUTO: 348 K/UL (ref 135–420)
PMV BLD AUTO: 9.3 FL (ref 9.2–11.8)
POTASSIUM SERPL-SCNC: 4.3 MMOL/L (ref 3.5–5.5)
RBC # BLD AUTO: 3.72 M/UL (ref 4.7–5.5)
SERVICE CMNT-IMP: ABNORMAL
SERVICE CMNT-IMP: ABNORMAL
SODIUM SERPL-SCNC: 139 MMOL/L (ref 136–145)
WBC # BLD AUTO: 7.6 K/UL (ref 4.6–13.2)

## 2019-03-02 PROCEDURE — 84100 ASSAY OF PHOSPHORUS: CPT

## 2019-03-02 PROCEDURE — 94668 MNPJ CHEST WALL SBSQ: CPT

## 2019-03-02 PROCEDURE — 74011250637 HC RX REV CODE- 250/637: Performed by: INTERNAL MEDICINE

## 2019-03-02 PROCEDURE — 83735 ASSAY OF MAGNESIUM: CPT

## 2019-03-02 PROCEDURE — 77010033678 HC OXYGEN DAILY

## 2019-03-02 PROCEDURE — 74011636637 HC RX REV CODE- 636/637: Performed by: FAMILY MEDICINE

## 2019-03-02 PROCEDURE — 74011250636 HC RX REV CODE- 250/636: Performed by: HOSPITALIST

## 2019-03-02 PROCEDURE — 82962 GLUCOSE BLOOD TEST: CPT

## 2019-03-02 PROCEDURE — 74011250637 HC RX REV CODE- 250/637: Performed by: FAMILY MEDICINE

## 2019-03-02 PROCEDURE — 85025 COMPLETE CBC W/AUTO DIFF WBC: CPT

## 2019-03-02 PROCEDURE — 74011000250 HC RX REV CODE- 250: Performed by: FAMILY MEDICINE

## 2019-03-02 PROCEDURE — 74011000258 HC RX REV CODE- 258: Performed by: HOSPITALIST

## 2019-03-02 PROCEDURE — 36415 COLL VENOUS BLD VENIPUNCTURE: CPT

## 2019-03-02 PROCEDURE — 80048 BASIC METABOLIC PNL TOTAL CA: CPT

## 2019-03-02 PROCEDURE — 82330 ASSAY OF CALCIUM: CPT

## 2019-03-02 PROCEDURE — 94640 AIRWAY INHALATION TREATMENT: CPT

## 2019-03-02 PROCEDURE — 77030018836 HC SOL IRR NACL ICUM -A

## 2019-03-02 PROCEDURE — 65270000029 HC RM PRIVATE

## 2019-03-02 RX ADMIN — LACTOBACILLUS TAB 1 TABLET: TAB at 09:30

## 2019-03-02 RX ADMIN — IPRATROPIUM BROMIDE AND ALBUTEROL SULFATE 3 ML: .5; 3 SOLUTION RESPIRATORY (INHALATION) at 00:15

## 2019-03-02 RX ADMIN — BACLOFEN 10 MG: 10 TABLET ORAL at 17:37

## 2019-03-02 RX ADMIN — SULFAMETHOXAZOLE AND TRIMETHOPRIM 1 TABLET: 400; 80 TABLET ORAL at 21:18

## 2019-03-02 RX ADMIN — MIDODRINE HYDROCHLORIDE 5 MG: 2.5 TABLET ORAL at 09:49

## 2019-03-02 RX ADMIN — PIPERACILLIN SODIUM,TAZOBACTAM SODIUM 4.5 G: 4; .5 INJECTION, POWDER, FOR SOLUTION INTRAVENOUS at 02:52

## 2019-03-02 RX ADMIN — BACLOFEN 10 MG: 10 TABLET ORAL at 09:30

## 2019-03-02 RX ADMIN — GABAPENTIN 600 MG: 300 CAPSULE ORAL at 22:15

## 2019-03-02 RX ADMIN — BUPROPION HYDROCHLORIDE 150 MG: 150 TABLET, EXTENDED RELEASE ORAL at 09:29

## 2019-03-02 RX ADMIN — PIPERACILLIN SODIUM,TAZOBACTAM SODIUM 3.38 G: 3; .375 INJECTION, POWDER, FOR SOLUTION INTRAVENOUS at 14:04

## 2019-03-02 RX ADMIN — ACETYLCYSTEINE 400 MG: 100 SOLUTION ORAL; RESPIRATORY (INHALATION) at 00:15

## 2019-03-02 RX ADMIN — PIPERACILLIN SODIUM,TAZOBACTAM SODIUM 3.38 G: 3; .375 INJECTION, POWDER, FOR SOLUTION INTRAVENOUS at 21:18

## 2019-03-02 RX ADMIN — CYCLOBENZAPRINE HYDROCHLORIDE 10 MG: 10 TABLET, FILM COATED ORAL at 22:16

## 2019-03-02 RX ADMIN — PANTOPRAZOLE SODIUM 20 MG: 20 TABLET, DELAYED RELEASE ORAL at 09:29

## 2019-03-02 RX ADMIN — MIDODRINE HYDROCHLORIDE 5 MG: 2.5 TABLET ORAL at 11:50

## 2019-03-02 RX ADMIN — GABAPENTIN 600 MG: 300 CAPSULE ORAL at 09:29

## 2019-03-02 RX ADMIN — INSULIN LISPRO 2 UNITS: 100 INJECTION, SOLUTION INTRAVENOUS; SUBCUTANEOUS at 17:35

## 2019-03-02 RX ADMIN — LEVETIRACETAM 500 MG: 500 TABLET, EXTENDED RELEASE ORAL at 09:31

## 2019-03-02 RX ADMIN — MULTIVITAMIN TABLET 1 TABLET: TABLET at 09:30

## 2019-03-02 RX ADMIN — DULOXETINE 60 MG: 60 CAPSULE, DELAYED RELEASE ORAL at 09:30

## 2019-03-02 RX ADMIN — MIRTAZAPINE 7.5 MG: 15 TABLET, FILM COATED ORAL at 22:15

## 2019-03-02 RX ADMIN — SULFAMETHOXAZOLE AND TRIMETHOPRIM 1 TABLET: 400; 80 TABLET ORAL at 09:30

## 2019-03-02 RX ADMIN — GABAPENTIN 600 MG: 300 CAPSULE ORAL at 16:43
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
## 2019-03-02 NOTE — PROGRESS NOTES
Problem: Falls - Risk of 
Goal: *Absence of Falls Document Anel Riddle Fall Risk and appropriate interventions in the flowsheet. Outcome: Progressing Towards Goal 
Fall Risk Interventions: 
  
 
Mentation Interventions: Bed/chair exit alarm Medication Interventions: Bed/chair exit alarm Elimination Interventions: Call light in reach, Bed/chair exit alarm Problem: Pressure Injury - Risk of 
Goal: *Prevention of pressure injury Document Sarwat Scale and appropriate interventions in the flowsheet. Outcome: Progressing Towards Goal 
Pressure Injury Interventions: 
Sensory Interventions: Assess changes in LOC, Minimize linen layers Moisture Interventions: Minimize layers Activity Interventions: Assess need for specialty bed Mobility Interventions: Assess need for specialty bed Nutrition Interventions: Document food/fluid/supplement intake Friction and Shear Interventions: Apply protective barrier, creams and emollients, Minimize layers Problem: Impaired Skin Integrity/Pressure Injury Treatment Goal: *Prevention of pressure injury Document Sarwat Scale and appropriate interventions in the flowsheet. Outcome: Progressing Towards Goal 
Pressure Injury Interventions: 
Sensory Interventions: Assess changes in LOC, Minimize linen layers Moisture Interventions: Minimize layers Activity Interventions: Assess need for specialty bed Mobility Interventions: Assess need for specialty bed Nutrition Interventions: Document food/fluid/supplement intake Friction and Shear Interventions: Apply protective barrier, creams and emollients, Minimize layers

## 2019-03-02 NOTE — PROGRESS NOTES
0720 Bedside verbal report received from luis Francisco. Pt alert, resting in bed. 
0800 Physical assessment completed at this time. 0930 received transfer orders. 1130 TRANSFER - OUT REPORT: 
 
Verbal report given to Taylor Serrano (name) on Sameer Martineztor  being transferred to [facility] (unit) for routine progression of care Report consisted of patients Situation, Background, Assessment and  
Recommendations(SBAR). Information from the following report(s) SBAR, Kardex, Intake/Output and MAR was reviewed with the receiving nurse. Lines:  
Peripheral IV 02/26/19 Right Wrist (Active) Site Assessment Clean, dry, & intact 3/2/2019  8:00 AM  
Phlebitis Assessment 0 3/2/2019  8:00 AM  
Infiltration Assessment 0 3/2/2019  8:00 AM  
Dressing Status Clean, dry, & intact 3/2/2019  8:00 AM  
Dressing Type Tape 3/2/2019  8:00 AM  
Hub Color/Line Status End cap changed 3/2/2019  8:00 AM  
Action Taken Open ports on tubing capped 3/2/2019  8:00 AM  
Alcohol Cap Used Yes 3/2/2019  8:00 AM  
   
Peripheral IV 02/26/19 Left Hand (Active) Site Assessment Clean, dry, & intact 3/2/2019  8:00 AM  
Phlebitis Assessment 0 3/2/2019  8:00 AM  
Infiltration Assessment 0 3/2/2019  8:00 AM  
Dressing Status Clean, dry, & intact 3/2/2019  8:00 AM  
Dressing Type Tape 3/2/2019  8:00 AM  
Hub Color/Line Status End cap changed 3/2/2019  8:00 AM  
Action Taken Open ports on tubing capped 3/2/2019  8:00 AM  
Alcohol Cap Used Yes 3/2/2019  8:00 AM  
   
Peripheral IV 02/27/19 Left Antecubital (Active) Site Assessment Clean, dry, & intact 3/2/2019  8:00 AM  
Phlebitis Assessment 0 3/2/2019  8:00 AM  
Infiltration Assessment 0 3/2/2019  8:00 AM  
Dressing Status Clean, dry, & intact 3/2/2019  8:00 AM  
Dressing Type Tape 3/2/2019  8:00 AM  
Hub Color/Line Status End cap changed 3/2/2019  8:00 AM  
Action Taken Open ports on tubing capped 3/2/2019  8:00 AM  
Alcohol Cap Used Yes 3/2/2019  8:00 AM  
  
 
 Opportunity for questions and clarification was provided. Patient transported with: 
 Registered Nurse

## 2019-03-02 NOTE — PROGRESS NOTES
Progress Note Patient: Clarence Barajas               Sex: male          DOA: 2/26/2019 YOB: 1980      Age:  34 y.o.        LOS:  LOS: 1 day CHIEF COMPLAINT:  Unresponsive Subjective:  
 
Pt seen and examined at bedside No major events overnight Pt said he is doing well Objective:  
  
Visit Vitals /77 Pulse 98 Temp 97.6 °F (36.4 °C) Resp 17 Ht 5' 7\" (1.702 m) Wt 62.1 kg (137 lb) SpO2 94% BMI 21.46 kg/m² Physical Exam: 
Physical Exam  
Constitutional: He is oriented to person, place, and time and well-developed, well-nourished, and in no distress. HENT:  
Head: Normocephalic and atraumatic. Eyes: Conjunctivae and EOM are normal. Pupils are equal, round, and reactive to light. Neck: Normal range of motion. +Tracheostomy with collar Cardiovascular: Normal rate, regular rhythm and normal heart sounds. Pulmonary/Chest: Effort normal.  
+coarse breath sounds Abdominal: Soft. Bowel sounds are normal.  
Musculoskeletal: Normal range of motion. Neurological: He is alert and oriented to person, place, and time. Skin: Skin is warm and dry. Nursing note and vitals reviewed. Lab/Data Reviewed: 
BMP:  
Lab Results Component Value Date/Time  03/02/2019 03:00 AM  
 K 4.3 03/02/2019 03:00 AM  
  03/02/2019 03:00 AM  
 CO2 25 03/02/2019 03:00 AM  
 AGAP 7 03/02/2019 03:00 AM  
  (H) 03/02/2019 03:00 AM  
 BUN 13 03/02/2019 03:00 AM  
 CREA 0.84 03/02/2019 03:00 AM  
 GFRAA >60 03/02/2019 03:00 AM  
 GFRNA >60 03/02/2019 03:00 AM  
 
CBC:  
Lab Results Component Value Date/Time WBC 7.6 03/02/2019 03:00 AM  
 HGB 9.2 (L) 03/02/2019 03:00 AM  
 HCT 30.6 (L) 03/02/2019 03:00 AM  
  03/02/2019 03:00 AM  
 
 
Imaging Reviewed: 
No results found. Assessment:  
 
Principal Problem: 
  Unresponsive episode (2/27/2019) Active Problems: Hypotension (2/27/2019) [facility] (3/1/2019) Metabolic encephalopathy (5/7/3421) Unresponsiveness (3/1/2019) PLAN: 
· Appreciate PCCM assistance, cont antibiotics for likely aspiration PNA. Sputum cx with Stenotrophomonas, Providencia, Morganella · SLP consulted:  Pt safe for regular/thin diet with PMV · Cont acceptable home medications for chronic conditions · DVT protocol · Moving out of ICU today I have personally reviewed all pertinent labs and films that have officially resulted over the last 24 hours. I have personally checked for all pending labs that are awaiting final results. Signed By: Bogdan Grider MD   
 March 2, 2019

## 2019-03-03 LAB
ANION GAP SERPL CALC-SCNC: 8 MMOL/L (ref 3–18)
BUN SERPL-MCNC: 21 MG/DL (ref 7–18)
BUN/CREAT SERPL: 21 (ref 12–20)
CALCIUM SERPL-MCNC: 9.4 MG/DL (ref 8.5–10.1)
CHLORIDE SERPL-SCNC: 103 MMOL/L (ref 100–108)
CO2 SERPL-SCNC: 25 MMOL/L (ref 21–32)
CREAT SERPL-MCNC: 1.01 MG/DL (ref 0.6–1.3)
GLUCOSE BLD STRIP.AUTO-MCNC: 113 MG/DL (ref 70–110)
GLUCOSE BLD STRIP.AUTO-MCNC: 128 MG/DL (ref 70–110)
GLUCOSE BLD STRIP.AUTO-MCNC: 139 MG/DL (ref 70–110)
GLUCOSE BLD STRIP.AUTO-MCNC: 179 MG/DL (ref 70–110)
GLUCOSE SERPL-MCNC: 123 MG/DL (ref 74–99)
POTASSIUM SERPL-SCNC: 4.2 MMOL/L (ref 3.5–5.5)
SODIUM SERPL-SCNC: 136 MMOL/L (ref 136–145)

## 2019-03-03 PROCEDURE — 74011250636 HC RX REV CODE- 250/636: Performed by: HOSPITALIST

## 2019-03-03 PROCEDURE — 74011250637 HC RX REV CODE- 250/637: Performed by: INTERNAL MEDICINE

## 2019-03-03 PROCEDURE — 77030037878 HC DRSG MEPILEX >48IN BORD MOLN -B

## 2019-03-03 PROCEDURE — 36415 COLL VENOUS BLD VENIPUNCTURE: CPT

## 2019-03-03 PROCEDURE — 65270000029 HC RM PRIVATE

## 2019-03-03 PROCEDURE — 74011000258 HC RX REV CODE- 258: Performed by: HOSPITALIST

## 2019-03-03 PROCEDURE — 82962 GLUCOSE BLOOD TEST: CPT

## 2019-03-03 PROCEDURE — 74011250637 HC RX REV CODE- 250/637: Performed by: FAMILY MEDICINE

## 2019-03-03 PROCEDURE — 80048 BASIC METABOLIC PNL TOTAL CA: CPT

## 2019-03-03 PROCEDURE — 74011636637 HC RX REV CODE- 636/637: Performed by: FAMILY MEDICINE

## 2019-03-03 PROCEDURE — 77010033678 HC OXYGEN DAILY

## 2019-03-03 RX ADMIN — DULOXETINE 60 MG: 60 CAPSULE, DELAYED RELEASE ORAL at 09:46

## 2019-03-03 RX ADMIN — MULTIVITAMIN TABLET 1 TABLET: TABLET at 09:44

## 2019-03-03 RX ADMIN — GABAPENTIN 600 MG: 300 CAPSULE ORAL at 21:38

## 2019-03-03 RX ADMIN — PIPERACILLIN SODIUM,TAZOBACTAM SODIUM 3.38 G: 3; .375 INJECTION, POWDER, FOR SOLUTION INTRAVENOUS at 05:53

## 2019-03-03 RX ADMIN — GABAPENTIN 600 MG: 300 CAPSULE ORAL at 16:57

## 2019-03-03 RX ADMIN — LEVETIRACETAM 500 MG: 500 TABLET, EXTENDED RELEASE ORAL at 09:46

## 2019-03-03 RX ADMIN — BACLOFEN 10 MG: 10 TABLET ORAL at 17:30

## 2019-03-03 RX ADMIN — PIPERACILLIN SODIUM,TAZOBACTAM SODIUM 3.38 G: 3; .375 INJECTION, POWDER, FOR SOLUTION INTRAVENOUS at 12:05

## 2019-03-03 RX ADMIN — LACTOBACILLUS TAB 1 TABLET: TAB at 09:44

## 2019-03-03 RX ADMIN — CYCLOBENZAPRINE HYDROCHLORIDE 10 MG: 10 TABLET, FILM COATED ORAL at 09:56

## 2019-03-03 RX ADMIN — SULFAMETHOXAZOLE AND TRIMETHOPRIM 1 TABLET: 400; 80 TABLET ORAL at 09:46

## 2019-03-03 RX ADMIN — SULFAMETHOXAZOLE AND TRIMETHOPRIM 1 TABLET: 400; 80 TABLET ORAL at 21:38

## 2019-03-03 RX ADMIN — GABAPENTIN 600 MG: 300 CAPSULE ORAL at 09:44

## 2019-03-03 RX ADMIN — PIPERACILLIN SODIUM,TAZOBACTAM SODIUM 3.38 G: 3; .375 INJECTION, POWDER, FOR SOLUTION INTRAVENOUS at 21:38

## 2019-03-03 RX ADMIN — MIRTAZAPINE 7.5 MG: 15 TABLET, FILM COATED ORAL at 21:38

## 2019-03-03 RX ADMIN — INSULIN LISPRO 2 UNITS: 100 INJECTION, SOLUTION INTRAVENOUS; SUBCUTANEOUS at 12:22

## 2019-03-03 RX ADMIN — PANTOPRAZOLE SODIUM 20 MG: 20 TABLET, DELAYED RELEASE ORAL at 09:46

## 2019-03-03 RX ADMIN — BACLOFEN 10 MG: 10 TABLET ORAL at 09:47

## 2019-03-03 RX ADMIN — MIDODRINE HYDROCHLORIDE 5 MG: 2.5 TABLET ORAL at 17:01

## 2019-03-03 RX ADMIN — CYCLOBENZAPRINE HYDROCHLORIDE 10 MG: 10 TABLET, FILM COATED ORAL at 17:29

## 2019-03-03 RX ADMIN — MIDODRINE HYDROCHLORIDE 5 MG: 2.5 TABLET ORAL at 12:03

## 2019-03-03 RX ADMIN — MIDODRINE HYDROCHLORIDE 5 MG: 2.5 TABLET ORAL at 09:43

## 2019-03-03 RX ADMIN — BUPROPION HYDROCHLORIDE 150 MG: 150 TABLET, EXTENDED RELEASE ORAL at 09:46

## 2019-03-03 NOTE — PROGRESS NOTES
Assumed patient care. Received patient awake, alert, oriented X4. Patient denies pain. On tracheostomy , Portex #6, area is dry, intact. Bed is locked and call bell is within reach. Not in acute distress.

## 2019-03-03 NOTE — PROGRESS NOTES
Received patient from 2700 on a special bed accompanied by RN's. Patient is awake, alert, oriented X4. Patient denies pain. On trach collar. No s/s of respiratory distress. Bed is locked and call bell is within reach. Not in acute distress.

## 2019-03-03 NOTE — PROGRESS NOTES
Problem: Falls - Risk of 
Goal: *Absence of Falls Document Claudia Synagogue Fall Risk and appropriate interventions in the flowsheet. Outcome: Progressing Towards Goal 
Fall Risk Interventions: 
  
 
Mentation Interventions: Bed/chair exit alarm Medication Interventions: Bed/chair exit alarm Elimination Interventions: Call light in reach, Bed/chair exit alarm Problem: Pressure Injury - Risk of 
Goal: *Prevention of pressure injury Document Sarwat Scale and appropriate interventions in the flowsheet. Outcome: Progressing Towards Goal 
Pressure Injury Interventions: 
Sensory Interventions: Assess changes in LOC, Minimize linen layers Moisture Interventions: Minimize layers Activity Interventions: Assess need for specialty bed Mobility Interventions: Assess need for specialty bed Nutrition Interventions: Document food/fluid/supplement intake Friction and Shear Interventions: Apply protective barrier, creams and emollients, Minimize layers Problem: Impaired Skin Integrity/Pressure Injury Treatment Goal: *Prevention of pressure injury Document Sarwat Scale and appropriate interventions in the flowsheet. Outcome: Progressing Towards Goal 
Pressure Injury Interventions: 
Sensory Interventions: Assess changes in LOC, Minimize linen layers Moisture Interventions: Minimize layers Activity Interventions: Assess need for specialty bed Mobility Interventions: Assess need for specialty bed Nutrition Interventions: Document food/fluid/supplement intake Friction and Shear Interventions: Apply protective barrier, creams and emollients, Minimize layers

## 2019-03-03 NOTE — PROGRESS NOTES
Progress Note Patient: Alyssa Olivier               Sex: male          DOA: 2/26/2019 YOB: 1980      Age:  34 y.o.        LOS:  LOS: 2 days CHIEF COMPLAINT:  Unresponsive Subjective:  
 
Pt seen and examined at bedside No major events overnight Pt said he is doing well Objective:  
  
Visit Vitals BP 95/61 Pulse (!) 103 Temp 97.9 °F (36.6 °C) Resp 16 Ht 5' 7\" (1.702 m) Wt 62.1 kg (137 lb) SpO2 95% BMI 21.46 kg/m² Physical Exam: 
Physical Exam  
Constitutional: He is oriented to person, place, and time and well-developed, well-nourished, and in no distress. HENT:  
Head: Normocephalic and atraumatic. Eyes: Conjunctivae and EOM are normal. Pupils are equal, round, and reactive to light. Neck: Normal range of motion. +Tracheostomy with collar Cardiovascular: Normal rate, regular rhythm and normal heart sounds. Pulmonary/Chest: Effort normal.  
Abdominal: Soft. Bowel sounds are normal.  
Musculoskeletal: Normal range of motion. Neurological: He is alert and oriented to person, place, and time. Skin: Skin is warm and dry. Nursing note and vitals reviewed. Lab/Data Reviewed: 
BMP:  
Lab Results Component Value Date/Time  03/03/2019 05:00 AM  
 K 4.2 03/03/2019 05:00 AM  
  03/03/2019 05:00 AM  
 CO2 25 03/03/2019 05:00 AM  
 AGAP 8 03/03/2019 05:00 AM  
  (H) 03/03/2019 05:00 AM  
 BUN 21 (H) 03/03/2019 05:00 AM  
 CREA 1.01 03/03/2019 05:00 AM  
 GFRAA >60 03/03/2019 05:00 AM  
 GFRNA >60 03/03/2019 05:00 AM  
 
CBC:  
No results found for: WBC, HGB, HGBEXT, HCT, HCTEXT, PLT, PLTEXT, HGBEXT, HCTEXT, PLTEXT Imaging Reviewed: 
No results found. Assessment:  
 
Principal Problem: 
  Unresponsive episode (2/27/2019) Active Problems: Hypotension (2/27/2019) Moderate malnutrition ([address] (3/1/2019) Metabolic encephalopathy (3/6/7721) Unresponsiveness (3/1/2019) PLAN: 
 · Appreciate [facility] assistance, cont antibiotics for likely aspiration PNA. Sputum cx with Stenotrophomonas, Providencia, Morganella. Can stop Bactrim on 3/7 to complete 7 day course. Can stop Zosyn on 3/4 or 3/5 to complete 6 or 7 day course (per PCCM rec zosyn for at least 5 days). · SLP consulted:  Pt safe for regular/thin diet with PMV · Cont acceptable home medications for chronic conditions · DVT protocol · Dispo: patient does not want to go back to [facility] as they suction only 3 times, wants to return to [facility], [facility] discussed with CM who sent referral today. Likely d/c to SNF in 1-2 days. I have personally reviewed all pertinent labs and films that have officially resulted over the last 24 hours. I have personally checked for all pending labs that are awaiting final results. Signed By: Paulette Lopez MD   
 March 3, 2019

## 2019-03-03 NOTE — PROGRESS NOTES
Problem: Falls - Risk of 
Goal: *Absence of Falls Document Sarah Jesus Fall Risk and appropriate interventions in the flowsheet. Outcome: Progressing Towards Goal 
Fall Risk Interventions: 
  
 
Mentation Interventions: Bed/chair exit alarm, Adequate sleep, hydration, pain control, More frequent rounding, Toileting rounds Medication Interventions: Evaluate medications/consider consulting pharmacy Elimination Interventions: Call light in reach, Patient to call for help with toileting needs, Toileting schedule/hourly rounds Problem: Pressure Injury - Risk of 
Goal: *Prevention of pressure injury Document Sarwat Scale and appropriate interventions in the flowsheet. Outcome: Progressing Towards Goal 
Pressure Injury Interventions: 
Sensory Interventions: Assess changes in LOC, Check visual cues for pain, Float heels, Keep linens dry and wrinkle-free, Minimize linen layers, Pressure redistribution bed/mattress (bed type), Turn and reposition approx. every two hours (pillows and wedges if needed) Moisture Interventions: Absorbent underpads, Check for incontinence Q2 hours and as needed, Internal/External urinary devices, Offer toileting Q_hr Activity Interventions: Pressure redistribution bed/mattress(bed type), PT/OT evaluation Mobility Interventions: Pressure redistribution bed/mattress (bed type), PT/OT evaluation, Turn and reposition approx. every two hours(pillow and wedges) Nutrition Interventions: Document food/fluid/supplement intake Friction and Shear Interventions: Foam dressings/transparent film/skin sealants, HOB 30 degrees or less, Minimize layers Problem: Impaired Skin Integrity/Pressure Injury Treatment Goal: *Prevention of pressure injury Document Sarwat Scale and appropriate interventions in the flowsheet.  
Outcome: Progressing Towards Goal 
Pressure Injury Interventions: 
Sensory Interventions: Assess changes in LOC, Check visual cues for pain, Float heels, Keep linens dry and wrinkle-free, Minimize linen layers, Pressure redistribution bed/mattress (bed type), Turn and reposition approx. every two hours (pillows and wedges if needed) Moisture Interventions: Absorbent underpads, Check for incontinence Q2 hours and as needed, Internal/External urinary devices, Offer toileting Q_hr Activity Interventions: Pressure redistribution bed/mattress(bed type), PT/OT evaluation Mobility Interventions: Pressure redistribution bed/mattress (bed type), PT/OT evaluation, Turn and reposition approx. every two hours(pillow and wedges) Nutrition Interventions: Document food/fluid/supplement intake Friction and Shear Interventions: Foam dressings/transparent film/skin sealants, HOB 30 degrees or less, Minimize layers

## 2019-03-03 NOTE — PROGRESS NOTES
Cough assist therapy was performed. Sxn moderate amount white secretions. Changed inner cannula. Emptied drain bag. Ambu bag and sxn catheters are at bedside. 3/3/2019 10:40 AM. 3/3/2019 15:10 PM : 
RN sxn patient at this time. 3/3/2019 1600: 
Cough assist therapy was performed. Sxn moderate amount white secretions.

## 2019-03-03 NOTE — PROGRESS NOTES
Informed by MD that pt does not want to return to [facility] as they will only suction him 3 times a day & wants to go to [facility], [facility]., referral sent in cc link in epic. Mari Cheney RN,ext [phone].

## 2019-03-03 NOTE — PROGRESS NOTES
1935: Assumed pt care. Received pt resting in bed, pt is awake, alert and oriented x 4. Denies any pain at this time. No signs of distress. With trach collar, clean, dry and intact. Cecostomy is clean, dry and intact, with a scant brown liquid output. Pt on a specialty bed. Call bell within reach. 
 
3-3-19 
 
7620: Bedside and Verbal shift change report given to Yovana Shell (oncoming nurse) by Jad Saunders 
 (offgoing nurse). Report included the following information SBAR, Intake/Output, MAR and Recent Results.

## 2019-03-03 NOTE — PROGRESS NOTES
Problem: Falls - Risk of 
Goal: *Absence of Falls Document [place] Fall Risk and appropriate interventions in the flowsheet. Outcome: Progressing Towards Goal 
Fall Risk Interventions: 
  
 
Mentation Interventions: Bed/chair exit alarm, Adequate sleep, hydration, pain control, More frequent rounding, Toileting rounds Medication Interventions: Evaluate medications/consider consulting pharmacy Elimination Interventions: Call light in reach, Patient to call for help with toileting needs, Toileting schedule/hourly rounds Problem: Pressure Injury - Risk of 
Goal: *Prevention of pressure injury Document Sarwat Scale and appropriate interventions in the flowsheet. Outcome: Progressing Towards Goal 
Pressure Injury Interventions: 
Sensory Interventions: Assess changes in LOC, Check visual cues for pain, Float heels, Keep linens dry and wrinkle-free, Minimize linen layers, Pressure redistribution bed/mattress (bed type), Turn and reposition approx. every two hours (pillows and wedges if needed) Moisture Interventions: Absorbent underpads, Check for incontinence Q2 hours and as needed, Internal/External urinary devices, Offer toileting Q_hr Activity Interventions: Pressure redistribution bed/mattress(bed type), PT/OT evaluation Mobility Interventions: Pressure redistribution bed/mattress (bed type), PT/OT evaluation, Turn and reposition approx. every two hours(pillow and wedges) Nutrition Interventions: Document food/fluid/supplement intake Friction and Shear Interventions: Foam dressings/transparent film/skin sealants, HOB 30 degrees or less, Minimize layers Problem: Impaired Skin Integrity/Pressure Injury Treatment Goal: *Prevention of pressure injury Document Sarwat Scale and appropriate interventions in the flowsheet.  
Outcome: Progressing Towards Goal 
Pressure Injury Interventions: 
Sensory Interventions: Assess changes in LOC, Check visual cues for pain, Float heels, Keep linens dry and wrinkle-free, Minimize linen layers, Pressure redistribution bed/mattress (bed type), Turn and reposition approx. every two hours (pillows and wedges if needed) Moisture Interventions: Absorbent underpads, Check for incontinence Q2 hours and as needed, Internal/External urinary devices, Offer toileting Q_hr Activity Interventions: Pressure redistribution bed/mattress(bed type), PT/OT evaluation Mobility Interventions: Pressure redistribution bed/mattress (bed type), PT/OT evaluation, Turn and reposition approx. every two hours(pillow and wedges) Nutrition Interventions: Document food/fluid/supplement intake Friction and Shear Interventions: Foam dressings/transparent film/skin sealants, HOB 30 degrees or less, Minimize layers

## 2019-03-04 LAB
GLUCOSE BLD STRIP.AUTO-MCNC: 114 MG/DL (ref 70–110)
GLUCOSE BLD STRIP.AUTO-MCNC: 151 MG/DL (ref 70–110)
GLUCOSE BLD STRIP.AUTO-MCNC: 91 MG/DL (ref 70–110)
GLUCOSE BLD STRIP.AUTO-MCNC: 97 MG/DL (ref 70–110)

## 2019-03-04 PROCEDURE — 74011250637 HC RX REV CODE- 250/637: Performed by: INTERNAL MEDICINE

## 2019-03-04 PROCEDURE — 77030018836 HC SOL IRR NACL ICUM -A

## 2019-03-04 PROCEDURE — 74011250637 HC RX REV CODE- 250/637: Performed by: FAMILY MEDICINE

## 2019-03-04 PROCEDURE — 82962 GLUCOSE BLOOD TEST: CPT

## 2019-03-04 PROCEDURE — 74011636637 HC RX REV CODE- 636/637: Performed by: FAMILY MEDICINE

## 2019-03-04 PROCEDURE — 94761 N-INVAS EAR/PLS OXIMETRY MLT: CPT

## 2019-03-04 PROCEDURE — 74011250636 HC RX REV CODE- 250/636: Performed by: HOSPITALIST

## 2019-03-04 PROCEDURE — 74011000258 HC RX REV CODE- 258: Performed by: HOSPITALIST

## 2019-03-04 PROCEDURE — 77030037878 HC DRSG MEPILEX >48IN BORD MOLN -B

## 2019-03-04 PROCEDURE — 65270000029 HC RM PRIVATE

## 2019-03-04 PROCEDURE — 77010033678 HC OXYGEN DAILY

## 2019-03-04 RX ORDER — SODIUM CHLORIDE 9 MG/ML
500 INJECTION, SOLUTION INTRAVENOUS ONCE
Status: COMPLETED | OUTPATIENT
Start: 2019-03-04 | End: 2019-03-04

## 2019-03-04 RX ADMIN — BUPROPION HYDROCHLORIDE 150 MG: 150 TABLET, EXTENDED RELEASE ORAL at 09:05

## 2019-03-04 RX ADMIN — PIPERACILLIN SODIUM,TAZOBACTAM SODIUM 3.38 G: 3; .375 INJECTION, POWDER, FOR SOLUTION INTRAVENOUS at 22:56

## 2019-03-04 RX ADMIN — MIDODRINE HYDROCHLORIDE 5 MG: 2.5 TABLET ORAL at 16:40

## 2019-03-04 RX ADMIN — SODIUM CHLORIDE 500 ML: 900 INJECTION, SOLUTION INTRAVENOUS at 09:46

## 2019-03-04 RX ADMIN — SULFAMETHOXAZOLE AND TRIMETHOPRIM 1 TABLET: 400; 80 TABLET ORAL at 09:04

## 2019-03-04 RX ADMIN — GABAPENTIN 600 MG: 300 CAPSULE ORAL at 23:11

## 2019-03-04 RX ADMIN — LEVETIRACETAM 500 MG: 500 TABLET, EXTENDED RELEASE ORAL at 09:05

## 2019-03-04 RX ADMIN — GABAPENTIN 600 MG: 300 CAPSULE ORAL at 16:40

## 2019-03-04 RX ADMIN — PANTOPRAZOLE SODIUM 20 MG: 20 TABLET, DELAYED RELEASE ORAL at 09:05

## 2019-03-04 RX ADMIN — INSULIN LISPRO 0.02 UNITS: 100 INJECTION, SOLUTION INTRAVENOUS; SUBCUTANEOUS at 12:07

## 2019-03-04 RX ADMIN — PIPERACILLIN SODIUM,TAZOBACTAM SODIUM 3.38 G: 3; .375 INJECTION, POWDER, FOR SOLUTION INTRAVENOUS at 13:29

## 2019-03-04 RX ADMIN — MIRTAZAPINE 7.5 MG: 15 TABLET, FILM COATED ORAL at 23:10

## 2019-03-04 RX ADMIN — PIPERACILLIN SODIUM,TAZOBACTAM SODIUM 3.38 G: 3; .375 INJECTION, POWDER, FOR SOLUTION INTRAVENOUS at 05:44

## 2019-03-04 RX ADMIN — MULTIVITAMIN TABLET 1 TABLET: TABLET at 09:05

## 2019-03-04 RX ADMIN — SULFAMETHOXAZOLE AND TRIMETHOPRIM 1 TABLET: 400; 80 TABLET ORAL at 23:11

## 2019-03-04 RX ADMIN — BACLOFEN 10 MG: 10 TABLET ORAL at 19:12

## 2019-03-04 RX ADMIN — MIDODRINE HYDROCHLORIDE 5 MG: 2.5 TABLET ORAL at 11:11

## 2019-03-04 RX ADMIN — GABAPENTIN 600 MG: 300 CAPSULE ORAL at 09:04

## 2019-03-04 RX ADMIN — DULOXETINE 60 MG: 60 CAPSULE, DELAYED RELEASE ORAL at 09:06

## 2019-03-04 RX ADMIN — LACTOBACILLUS TAB 1 TABLET: TAB at 09:05

## 2019-03-04 RX ADMIN — BACLOFEN 10 MG: 10 TABLET ORAL at 09:05

## 2019-03-04 NOTE — PROGRESS NOTES
NUTRITION follow-up/Plan of care RECOMMENDATIONS:  
 
1. Diabetic Consistent Carb diet 2. Ensure Enlive TID 3. Monitor weight, labs and PO intake 4. RD to follow GOALS:  
 
1. Met/Ongoing: PO intake meets >75% of protein/calorie needs by 3/9 
2. Ongoing: Maintain weight (+/- 1-2 lb by 3/9) ASSESSMENT:  
  
Weight status is classified as normal per BMI of 20.5. PO intake appears adequate. Continue Ensure Enlive supplements to help with wound healing. Labs noted. BG range from 113-179 over past 24 hours. Nutrition recommendations listed. RD to follow. Nutrition Risk:  []   High [x]  Moderate [] Low SUBJECTIVE/OBJECTIVE:  
 
Transferred from ICU. Patient admitted with sepsis and hypotension. PMHx including a tracheostomy and quadriplegia. Patient with unstageable pressure ulcer to sacral/coccyx documented by nursing. Denies having any food allergies or problems chewing/swallowing. Patient reports having a good appetite and eating most of meals. Observed 90% intake of lunch meal during visit and % intake of meals per vitals. States drinking Ensure supplements when he receives them and prefers vanilla flavor. Encouraged intake and will monitor.  
  
Information Obtained From:  
[x] Chart Review [x] Patient 
[] Family/Caregiver 
[] Nurse/Physician  
[] Patient Rounds/Interdisciplinary Meeting Diet: Diabetic Consistent Carb diet Patient Vitals for the past 100 hrs: 
 % Diet Eaten 03/04/19 0834 100 % 03/03/19 1745 100 % 03/03/19 1230 100 % 03/03/19 0825 100 % 03/02/19 1415 75 % 03/01/19 1600 100 % 03/01/19 1200 100 % 03/01/19 0800 100 % 02/28/19 1700 100 % 02/28/19 1200 100 % 02/28/19 0800 95 % Medications: [x] Reviewed Encounter Diagnoses ICD-10-CM ICD-9-CM 1. Sepsis, due to unspecified organism ([facility] A41.9 038.9  
  995.91  
2. Hypotension, unspecified hypotension type I95.9 458.9 Past Medical History:  
Diagnosis Date  Abnormal body position  Acquired hypotonia  Acute flaccid quadriplegia (HCC)  Acute hypokalemia  Acute left-sided muscle weakness  Cecostomy status ([facility]  Chronic idiopathic anal pain  Chronic kidney disease due to systemic infection ([facility]  Chronic major depressive disorder, recurrent episode [facility]  Decubitus ulcer  Diabetes mellitus ([facility]  Esophageal reflux  Seizures ([facility]  Tracheostomy status ([facility]  Urinary tract infection, site not specified Labs:   
Lab Results Component Value Date/Time Sodium 136 03/03/2019 05:00 AM  
 Potassium 4.2 03/03/2019 05:00 AM  
 Chloride 103 03/03/2019 05:00 AM  
 CO2 25 03/03/2019 05:00 AM  
 Anion gap 8 03/03/2019 05:00 AM  
 Glucose 123 (H) 03/03/2019 05:00 AM  
 BUN 21 (H) 03/03/2019 05:00 AM  
 Creatinine 1.01 03/03/2019 05:00 AM  
 Calcium 9.4 03/03/2019 05:00 AM  
 Magnesium 1.7 03/02/2019 03:00 AM  
 Phosphorus 2.4 (L) 03/02/2019 03:00 AM  
 Albumin 2.8 (L) 02/26/2019 11:30 PM  
 
Anthropometrics: BMI (calculated): 20.5 Last 3 Recorded Weights in this Encounter 02/28/19 1827 03/02/19 0252 03/04/19 5775 Weight: 60.3 kg (133 lb) 62.1 kg (137 lb) 59.2 kg (130 lb 9.6 oz) Ht Readings from Last 1 Encounters:  
02/27/19 5' 7\" (1.702 m) [x]  Variable weights 
[]  Weight Gain 
[]  Weight Stable  
[]  New wt n/a on record Estimated Nutrition Needs:  
1995 Kcals/day Protein (g): 86 g Nutrition Problems Identified:  
[] Suboptimal PO intake  
[] Food Allergies [] Difficulty chewing/swallowing/poor dentition 
[] Constipation/Diarrhea  
[] Nausea/Vomiting  
[] None 
[x] Other: Wound healing Plan:  
[x] Therapeutic Diet [x]  Obtained/adjusted food preferences/tolerances and/or snacks options [x]  Continue dietary supplements as prescribed 
[] Occupational therapy following for feeding techniques []  HS snack added  
[]  Modify diet texture  
[]  Modify diet for food allergies []  Assist with menu selection [x]  Monitor PO intake on meal rounds  
[x]  Continue inpatient monitoring and intervention  
[]  Participated in discharge planning/Interdisciplinary rounds/Team meetings  
[]  Other:  
 
Education Needs: 
 [] Not appropriate for teaching at this time due to: 
 [x] Identified and addressed Nutrition Monitoring and Evaluation: 
 [x] Continue inpatient monitoring and interventions [] Other:  
 
Chele Solorio

## 2019-03-04 NOTE — PROGRESS NOTES
-0800-Received patient on 30% Trach collar. O2 Sats-100% HR-96, RR-20. Alessia Aschoff noted to be patent and secure. Respiratory will continue to monitor. -[facility] 
 
 
-1545-Pt. Currently on 28% Trach collar. O2 sats-97% HR--101, R-22. Alessia Aschoff remains patent and secure. Trach care performed. Inner cannula changed. -[address]

## 2019-03-04 NOTE — PROGRESS NOTES
Progress Note Patient: Yusra Aly               Sex: male          DOA: 2/26/2019 YOB: 1980      Age:  34 y.o.        LOS:  LOS: 3 days CHIEF COMPLAINT:  Metabolic encephalopathy in the setting of chronic respiratory failure and Quadriplegia Subjective:  
 
Patient is awake and talking No distress Objective:  
  
Visit Vitals /73 (BP 1 Location: Right arm, BP Patient Position: At rest;Lying left side) Pulse 98 Temp 98.6 °F (37 °C) Resp 16 Ht 5' 7\" (1.702 m) Wt 59.2 kg (130 lb 9.6 oz) SpO2 99% BMI 20.45 kg/m² Physical Exam: 
Gen:  No distress, no complaint Lungs:  Clear bilaterally, no wheeze or rhonchi Heart:  Regular rate and rhythm, no murmurs or gallops Abdomen:  Soft, non-tender, normal bowel sounds Lab/Data Reviewed: 
 
Labs reviewed Assessment/Plan Principal Problem: 
  Unresponsive episode (2/27/2019) Active Problems: Hypotension (2/27/2019) Metabolic encephalopathy (8/4/4794) DM (diabetes mellitus) ([address] (2/23/2019) Seizures ([address] (2/23/2019) Moderate malnutrition ([address] (3/1/2019) Quadriplegia ([address] (2/23/2019) Plan: 
Continue with supportive care BS control BP control Follow mental status Working toward disposition.

## 2019-03-04 NOTE — ROUTINE PROCESS
Bedside and Verbal shift change report given to Luis Felipe Sotelo RN (oshncoming nurse) by Mono John RN (offgoing nurse). Report included the folowing information SBAR, Kardex, Intake/Output, MAR and Recent Results.

## 2019-03-04 NOTE — PROGRESS NOTES
Pulmonary progress note History 34year-old quadriplegic presented to the emergency room on 2/26/19 secondary to unresponsiveness. Chest CT showed bilateral lower lobe infiltrates left greater than right with a subtle right upper lobe infiltrate. Sputum collected on 2/23/19 grew Stenotrophomonas and Providencia. The patient's antibiotics were changed from Levaquin to Bactrim. Vancomycin was discontinued, and Zosyn was continued. Sputum production has clearly improved. Today he had one instance of a viscous sputum. Otherwise he is feeling close to baseline Exam 
He is quadriplegic. He can speak understandably. His voice projection is stronger Blood pressure 107/68, pulse (!) 105, temperature 98.3 °F (36.8 °C), resp. rate 16, height 5' 7\" (1.702 m), weight 59.2 kg (130 lb 9.6 oz), SpO2 100 %. Gaze is conjugate. Extraocular muscles are intact. No drainage from tracheostomy tube site Lungs are clear Heart has a regular rate and rhythm Abdomen is nondistended Spastic tremors of the extremities No significant edema 2/23/19 stenotrophomonas is sensitive to Bactrim and Providencia is susceptible to Zosyn, third generation cephalosporins and carbopenems. There is no oral or nebulized therapies that will be clearly effective against the Providencia. Morganella has grown from sputum 2/27/19; it is susceptible to Zosyn Assessment Quadriplegic with chronic vent dependency Bilateral lower lobe infiltrates with subtle right upper lobe infiltrate Morganella, Stenotrophomonas and Providencia positive sputum now on Bactrim and Zosyn Plan Continue Bactrim for 7 days. Day 5 Discontinue vancomycin Zosyn for 7 days. Day 6 Continue CoughAssist device Continued nebs therapies as necessary Consider guaifenesin Awaiting disposition. Pulmonary status appears at baseline.

## 2019-03-04 NOTE — PROGRESS NOTES
Problem: Falls - Risk of 
Goal: *Absence of Falls Document Polly Masters Fall Risk and appropriate interventions in the flowsheet. Outcome: Progressing Towards Goal 
Fall Risk Interventions: 
  
 
Mentation Interventions: Adequate sleep, hydration, pain control, Door open when patient unattended, More frequent rounding, Toileting rounds Medication Interventions: Evaluate medications/consider consulting pharmacy Elimination Interventions: Call light in reach, Patient to call for help with toileting needs Problem: Pressure Injury - Risk of 
Goal: *Prevention of pressure injury Document Sarwat Scale and appropriate interventions in the flowsheet. Outcome: Progressing Towards Goal 
Pressure Injury Interventions: 
Sensory Interventions: Assess changes in LOC, Float heels, Keep linens dry and wrinkle-free, Turn and reposition approx. every two hours (pillows and wedges if needed) Moisture Interventions: Absorbent underpads, Apply protective barrier, creams and emollients, Check for incontinence Q2 hours and as needed, Internal/External urinary devices, Maintain skin hydration (lotion/cream), Minimize layers Activity Interventions: Pressure redistribution bed/mattress(bed type), PT/OT evaluation Mobility Interventions: Pressure redistribution bed/mattress (bed type), PT/OT evaluation, Turn and reposition approx. every two hours(pillow and wedges) Nutrition Interventions: Document food/fluid/supplement intake Friction and Shear Interventions: Apply protective barrier, creams and emollients, Foam dressings/transparent film/skin sealants, Minimize layers Problem: Impaired Skin Integrity/Pressure Injury Treatment Goal: *Prevention of pressure injury Document Sarwat Scale and appropriate interventions in the flowsheet.  
Outcome: Progressing Towards Goal 
Pressure Injury Interventions: 
Sensory Interventions: Assess changes in LOC, Float heels, Keep linens dry and wrinkle-free, Turn and reposition approx. every two hours (pillows and wedges if needed) Moisture Interventions: Absorbent underpads, Apply protective barrier, creams and emollients, Check for incontinence Q2 hours and as needed, Internal/External urinary devices, Maintain skin hydration (lotion/cream), Minimize layers Activity Interventions: Pressure redistribution bed/mattress(bed type), PT/OT evaluation Mobility Interventions: Pressure redistribution bed/mattress (bed type), PT/OT evaluation, Turn and reposition approx. every two hours(pillow and wedges) Nutrition Interventions: Document food/fluid/supplement intake Friction and Shear Interventions: Apply protective barrier, creams and emollients, Foam dressings/transparent film/skin sealants, Minimize layers

## 2019-03-04 NOTE — PROGRESS NOTES
0820 This nurse at bedside along with Jens Grossman [facility] (Preceptor). Patient blood pressure low 75/48 taken with automatic blood pressure cuff patient is asymptomatic denies any dizziness, floaters or feeling faint. 5284 Patient awake blood pressure retaken manually by preceptor blood pressure 88/56. patient is asymptomatic denies any dizziness, floaters or feeling faint. This nurse and Preceptor remained at bedside. 3195 Dr. Haseeb Peterson was called to inform of patient status. 9057 Dr. Haseeb Peterson returned call and was given SBAR MD gave telephone order for 500 mL normal saline bolus; retake blood pressure and update of blood pressure. This nurse attempted to place second IV twice and unable to start IV Preceptor attempted to start IV twice unsuccessfully. Patient tolerated well. Patient currently has Zosyn infusing and will be done at 25 [id number] will start bolus of 500 mL normal saline at that time. Jose Morgan 2016 This nurse and preceptor remained at bedside monitoring patient. Blood pressure retaken 88/45 patient is asymptomatic denies any dizziness, floaters or feeling faint.

## 2019-03-04 NOTE — PROGRESS NOTES
conducted a Follow up consultation and Spiritual Assessment for Bishop Blank, who is a 34 y.o.,male. The  provided the following Interventions: 
Continued the relationship of care and support. Listened empathically. Offered prayer and assurance of continued prayer on patients behalf. Chart reviewed. The following outcomes were achieved: 
Patient expressed gratitude for pastoral care visit. Assessment: 
There are no further spiritual or Confucianist issues which require Spiritual Care Services interventions at this time. Plan: 
Chaplains will continue to follow and will provide pastoral care on an as needed/requested basis.  recommends bedside caregivers page  on duty if patient shows signs of acute spiritual or emotional distress. [address] [facility] Staff  Spiritual Care  
[phone]

## 2019-03-04 NOTE — MANAGEMENT PLAN
Discharge/Transition Planning Ailyn Ferguson is not likely to accept pt as he is long term care and they have waiting list for Medicaid beds. He does not have skilled need for SNF to go under [facility]. If does not want [facility], will need more options. Maynor Jackson RN BSN Outcomes Manager Pager # [phone]

## 2019-03-04 NOTE — PROGRESS NOTES
1940: Assumed pt care. Received pt resting in bed, pt is alert and oriented x 4. Denies any pain at this time. Trach collar, clean, dry and intact. Cecostomy is clean, dry and intact, with small brown watery output. Suctioned patient to the trach, suctioned moderate amount of thick white secretions. Patient stated he is breathing better after being suctioned. Call bell within reach. 
 
3-4-19 
 
0545: bathed patient, wound care and dressing change done on pt's sacrum and buttocks, pt tolerated procedure well. 0740: Bedside and Verbal shift change report given to Riley Cosby and Denise Sinha (oncoming nurse) by Mumtaz Maddox 
 (offgoing nurse). Report included the following information SBAR, Intake/Output, MAR and Recent Results.

## 2019-03-04 NOTE — PROGRESS NOTES
Problem: Falls - Risk of 
Goal: *Absence of Falls Document Maryam End Fall Risk and appropriate interventions in the flowsheet. Outcome: Progressing Towards Goal 
Fall Risk Interventions: 
  
 
Mentation Interventions: Adequate sleep, hydration, pain control Medication Interventions: Evaluate medications/consider consulting pharmacy Elimination Interventions: Call light in reach, Patient to call for help with toileting needs Problem: Pressure Injury - Risk of 
Goal: *Prevention of pressure injury Document Sarwat Scale and appropriate interventions in the flowsheet. Outcome: Progressing Towards Goal 
Pressure Injury Interventions: 
Sensory Interventions: Assess changes in LOC, Float heels, Keep linens dry and wrinkle-free, Turn and reposition approx. every two hours (pillows and wedges if needed) Moisture Interventions: Absorbent underpads, Apply protective barrier, creams and emollients, Check for incontinence Q2 hours and as needed, Internal/External urinary devices, Moisture barrier, Maintain skin hydration (lotion/cream), Minimize layers Activity Interventions: Pressure redistribution bed/mattress(bed type), PT/OT evaluation Mobility Interventions: Pressure redistribution bed/mattress (bed type) Nutrition Interventions: Document food/fluid/supplement intake Friction and Shear Interventions: Apply protective barrier, creams and emollients, Foam dressings/transparent film/skin sealants, Minimize layers Problem: Impaired Skin Integrity/Pressure Injury Treatment Goal: *Prevention of pressure injury Document Sarwat Scale and appropriate interventions in the flowsheet. Outcome: Progressing Towards Goal 
Pressure Injury Interventions: 
Sensory Interventions: Assess changes in LOC, Float heels, Keep linens dry and wrinkle-free, Turn and reposition approx. every two hours (pillows and wedges if needed) Moisture Interventions: Absorbent underpads, Apply protective barrier, creams and emollients, Check for incontinence Q2 hours and as needed, Internal/External urinary devices, Moisture barrier, Maintain skin hydration (lotion/cream), Minimize layers Activity Interventions: Pressure redistribution bed/mattress(bed type), PT/OT evaluation Mobility Interventions: Pressure redistribution bed/mattress (bed type) Nutrition Interventions: Document food/fluid/supplement intake Friction and Shear Interventions: Apply protective barrier, creams and emollients, Foam dressings/transparent film/skin sealants, Minimize layers

## 2019-03-05 LAB
BACTERIA SPEC CULT: NORMAL
BACTERIA SPEC CULT: NORMAL
GLUCOSE BLD STRIP.AUTO-MCNC: 119 MG/DL (ref 70–110)
GLUCOSE BLD STRIP.AUTO-MCNC: 140 MG/DL (ref 70–110)
GLUCOSE BLD STRIP.AUTO-MCNC: 88 MG/DL (ref 70–110)
GLUCOSE BLD STRIP.AUTO-MCNC: 89 MG/DL (ref 70–110)
SERVICE CMNT-IMP: NORMAL
SERVICE CMNT-IMP: NORMAL

## 2019-03-05 PROCEDURE — 74011250637 HC RX REV CODE- 250/637: Performed by: FAMILY MEDICINE

## 2019-03-05 PROCEDURE — 82962 GLUCOSE BLOOD TEST: CPT

## 2019-03-05 PROCEDURE — 74011250637 HC RX REV CODE- 250/637: Performed by: INTERNAL MEDICINE

## 2019-03-05 PROCEDURE — 74011250636 HC RX REV CODE- 250/636: Performed by: HOSPITALIST

## 2019-03-05 PROCEDURE — 74011000258 HC RX REV CODE- 258: Performed by: HOSPITALIST

## 2019-03-05 PROCEDURE — 89220 SPUTUM SPECIMEN COLLECTION: CPT

## 2019-03-05 PROCEDURE — 65270000029 HC RM PRIVATE

## 2019-03-05 PROCEDURE — 77010033678 HC OXYGEN DAILY

## 2019-03-05 RX ADMIN — BACLOFEN 10 MG: 10 TABLET ORAL at 17:35

## 2019-03-05 RX ADMIN — DULOXETINE 60 MG: 60 CAPSULE, DELAYED RELEASE ORAL at 09:37

## 2019-03-05 RX ADMIN — MIRTAZAPINE 7.5 MG: 15 TABLET, FILM COATED ORAL at 22:31

## 2019-03-05 RX ADMIN — BACLOFEN 10 MG: 10 TABLET ORAL at 09:34

## 2019-03-05 RX ADMIN — PIPERACILLIN SODIUM,TAZOBACTAM SODIUM 3.38 G: 3; .375 INJECTION, POWDER, FOR SOLUTION INTRAVENOUS at 22:30

## 2019-03-05 RX ADMIN — GABAPENTIN 600 MG: 300 CAPSULE ORAL at 17:34

## 2019-03-05 RX ADMIN — LACTOBACILLUS TAB 1 TABLET: TAB at 09:36

## 2019-03-05 RX ADMIN — SULFAMETHOXAZOLE AND TRIMETHOPRIM 1 TABLET: 400; 80 TABLET ORAL at 09:36

## 2019-03-05 RX ADMIN — PANTOPRAZOLE SODIUM 20 MG: 20 TABLET, DELAYED RELEASE ORAL at 09:36

## 2019-03-05 RX ADMIN — MULTIVITAMIN TABLET 1 TABLET: TABLET at 09:36

## 2019-03-05 RX ADMIN — BUPROPION HYDROCHLORIDE 150 MG: 150 TABLET, EXTENDED RELEASE ORAL at 09:36

## 2019-03-05 RX ADMIN — SULFAMETHOXAZOLE AND TRIMETHOPRIM 1 TABLET: 400; 80 TABLET ORAL at 22:31

## 2019-03-05 RX ADMIN — GABAPENTIN 600 MG: 300 CAPSULE ORAL at 09:36

## 2019-03-05 RX ADMIN — PIPERACILLIN SODIUM,TAZOBACTAM SODIUM 3.38 G: 3; .375 INJECTION, POWDER, FOR SOLUTION INTRAVENOUS at 05:47

## 2019-03-05 RX ADMIN — LEVETIRACETAM 500 MG: 500 TABLET, EXTENDED RELEASE ORAL at 09:36

## 2019-03-05 RX ADMIN — MIDODRINE HYDROCHLORIDE 5 MG: 2.5 TABLET ORAL at 17:33

## 2019-03-05 RX ADMIN — PIPERACILLIN SODIUM,TAZOBACTAM SODIUM 3.38 G: 3; .375 INJECTION, POWDER, FOR SOLUTION INTRAVENOUS at 13:31

## 2019-03-05 RX ADMIN — GABAPENTIN 600 MG: 300 CAPSULE ORAL at 22:30

## 2019-03-05 NOTE — PROGRESS NOTES
Problem: Falls - Risk of  Goal: *Absence of Falls  Document Savannah Fall Risk and appropriate interventions in the flowsheet. Outcome: Progressing Towards Goal  Fall Risk Interventions:       Mentation Interventions: Adequate sleep, hydration, pain control    Medication Interventions: Evaluate medications/consider consulting pharmacy    Elimination Interventions: Call light in reach             Problem: Pressure Injury - Risk of  Goal: *Prevention of pressure injury  Document Sarwat Scale and appropriate interventions in the flowsheet. Outcome: Progressing Towards Goal  Pressure Injury Interventions:  Sensory Interventions: Assess changes in LOC    Moisture Interventions: Absorbent underpads    Activity Interventions: Pressure redistribution bed/mattress(bed type)    Mobility Interventions: Pressure redistribution bed/mattress (bed type)    Nutrition Interventions: Document food/fluid/supplement intake    Friction and Shear Interventions: Apply protective barrier, creams and emollients               Problem: Impaired Skin Integrity/Pressure Injury Treatment  Goal: *Prevention of pressure injury  Document Sarwat Scale and appropriate interventions in the flowsheet.   Pressure Injury Interventions:  Sensory Interventions: Assess changes in LOC    Moisture Interventions: Absorbent underpads    Activity Interventions: Pressure redistribution bed/mattress(bed type)    Mobility Interventions: Pressure redistribution bed/mattress (bed type)    Nutrition Interventions: Document food/fluid/supplement intake    Friction and Shear Interventions: Apply protective barrier, creams and emollients

## 2019-03-05 NOTE — PROGRESS NOTES
Bedside and Verbal shift change report given to Kendell Mohan RN (oncoming nurse) by Alfonso Vargas RN (offgoing nurse). Report included the following information SBAR, Kardex, Intake/Output and MAR. Pt resting in bed, no acute concerns noted, vitals WNL, no distress noted, trach in place, pt denies pain, denied turn, bed locked and low, alarm in reach, will continue to monitor.     1500- full bed change and bath

## 2019-03-05 NOTE — PROGRESS NOTES
AM CPT held, patient asleep. No distress noted. Patient had been awake all evening and finally went to sleep after his room was reassigned.

## 2019-03-05 NOTE — ROUTINE PROCESS
Bedside and Verbal shift change report given to Sukhdev Spivey (oncoming nurse) by Aminata Castellon RN (offgoing nurse). Report included the following information SBAR, Kardex, Intake/Output, MAR and Recent Results.

## 2019-03-05 NOTE — PROGRESS NOTES
Progress Note      Patient: Carlos Gilliland               Sex: male          DOA: 2/26/2019       YOB: 1980      Age:  34 y.o.        LOS:  LOS: 6 days             CHIEF COMPLAINT:  Altered mental status improved    Subjective:     Patient is awake and talking  No distress    Objective:      Visit Vitals  /88   Pulse 91   Temp 98 °F (36.7 °C)   Resp 18   Ht 5' 7\" (1.702 m)   Wt 61 kg (134 lb 7.7 oz)   SpO2 95%   BMI 21.06 kg/m²       Physical Exam:  Gen:  No distress, no complaint  Lungs:  Clear bilaterally, no wheeze or rhonchi  Heart:  Regular rate and rhythm, no murmurs or gallops  Abdomen:  Soft, non-tender, normal bowel sounds        Lab/Data Reviewed:    Labs reviewed      Assessment/Plan     Principal Problem:    Unresponsive episode (2/27/2019)    Active Problems:    Hypotension (2/54/8620)      Metabolic encephalopathy (4/6/5618)      DM (diabetes mellitus) ([facility]) (2/23/2019)      Seizures ([address] (2/23/2019)      Moderate malnutrition ([address] (3/1/2019)      Quadriplegia ([address] (2/23/2019)      Septic shock ([address] (3/7/2019)      Overview: Present on admission, improved      Acute on chronic respiratory failure with hypoxia and hypercapnia (HCC) (3/7/2019)      Overview: Present on admission, improved        Plan:  Continue with supportive care  Follow respiratory status  BS control  Working toward disposition.

## 2019-03-05 NOTE — PROGRESS NOTES
[address] pt care from [place], [address] [facility]. Pt in bed, alert and oriented x 4. Not in any form of distress. Denies pain. Frequent use items and call bell within reach. Verbalized understanding to call for assistance. Bed locked in lowest position. 2000 - Patient's ostomy bag and condom cath leaking. Replaced ostomy wafer and bag. Incontinence care completed. Bed pad, linen and gown changed. New condom cath applied. 0600 - Ostomy bag replaced. Incontinence care completed. Condom cath replaced. 0745 - Bedside and Verbal shift change report given to Jackie Mack RN (oncoming nurse) by eJrzy Norris RN (offgoing nurse). Report included the following information SBAR, Kardex, Intake/Output, MAR and Recent Results.

## 2019-03-05 NOTE — PROGRESS NOTES
03/04/19 I talked with patient and he agreed to be posted to [facility] [place] area. He has a stable trach and is oriented. I am not sure if any snf/long term care do not take trachs. But they are sent short snf list that documents he has a trach, we will see if he gets acceptance. He again said he did not want to go to [facility]. He states his brothers have to be notified before any long term care accepted. Posted in [employer] and  link. [facility]. ARYA Garcia, [facility] Care Management [phone], Pager [phone]

## 2019-03-05 NOTE — PROGRESS NOTES
-1150-cough assist held. Pt. Is sleeping. No respiratory distress noted-[facility]    -1605-Pt. Remains on Trach collar 28% FIO2. Inner cannula, Drainage sponge changed.

## 2019-03-06 LAB
GLUCOSE BLD STRIP.AUTO-MCNC: 130 MG/DL (ref 70–110)
GLUCOSE BLD STRIP.AUTO-MCNC: 139 MG/DL (ref 70–110)
GLUCOSE BLD STRIP.AUTO-MCNC: 184 MG/DL (ref 70–110)
GLUCOSE BLD STRIP.AUTO-MCNC: 91 MG/DL (ref 70–110)

## 2019-03-06 PROCEDURE — 94668 MNPJ CHEST WALL SBSQ: CPT

## 2019-03-06 PROCEDURE — 74011250636 HC RX REV CODE- 250/636: Performed by: HOSPITALIST

## 2019-03-06 PROCEDURE — 77010033678 HC OXYGEN DAILY

## 2019-03-06 PROCEDURE — 94760 N-INVAS EAR/PLS OXIMETRY 1: CPT

## 2019-03-06 PROCEDURE — 65270000029 HC RM PRIVATE

## 2019-03-06 PROCEDURE — 74011636637 HC RX REV CODE- 636/637: Performed by: FAMILY MEDICINE

## 2019-03-06 PROCEDURE — 89220 SPUTUM SPECIMEN COLLECTION: CPT

## 2019-03-06 PROCEDURE — 82962 GLUCOSE BLOOD TEST: CPT

## 2019-03-06 PROCEDURE — 74011250637 HC RX REV CODE- 250/637: Performed by: FAMILY MEDICINE

## 2019-03-06 PROCEDURE — 74011000258 HC RX REV CODE- 258: Performed by: HOSPITALIST

## 2019-03-06 PROCEDURE — 74011250637 HC RX REV CODE- 250/637: Performed by: INTERNAL MEDICINE

## 2019-03-06 RX ADMIN — GABAPENTIN 600 MG: 300 CAPSULE ORAL at 23:16

## 2019-03-06 RX ADMIN — PIPERACILLIN SODIUM,TAZOBACTAM SODIUM 3.38 G: 3; .375 INJECTION, POWDER, FOR SOLUTION INTRAVENOUS at 12:40

## 2019-03-06 RX ADMIN — BACLOFEN 10 MG: 10 TABLET ORAL at 18:53

## 2019-03-06 RX ADMIN — GABAPENTIN 600 MG: 300 CAPSULE ORAL at 17:55

## 2019-03-06 RX ADMIN — MIDODRINE HYDROCHLORIDE 5 MG: 2.5 TABLET ORAL at 09:17

## 2019-03-06 RX ADMIN — PANTOPRAZOLE SODIUM 20 MG: 20 TABLET, DELAYED RELEASE ORAL at 09:17

## 2019-03-06 RX ADMIN — MIDODRINE HYDROCHLORIDE 5 MG: 2.5 TABLET ORAL at 12:39

## 2019-03-06 RX ADMIN — DULOXETINE 60 MG: 60 CAPSULE, DELAYED RELEASE ORAL at 09:17

## 2019-03-06 RX ADMIN — BUPROPION HYDROCHLORIDE 150 MG: 150 TABLET, EXTENDED RELEASE ORAL at 09:17

## 2019-03-06 RX ADMIN — LEVETIRACETAM 500 MG: 500 TABLET, EXTENDED RELEASE ORAL at 09:17

## 2019-03-06 RX ADMIN — PIPERACILLIN SODIUM,TAZOBACTAM SODIUM 3.38 G: 3; .375 INJECTION, POWDER, FOR SOLUTION INTRAVENOUS at 05:42

## 2019-03-06 RX ADMIN — SULFAMETHOXAZOLE AND TRIMETHOPRIM 1 TABLET: 400; 80 TABLET ORAL at 09:17

## 2019-03-06 RX ADMIN — MIDODRINE HYDROCHLORIDE 5 MG: 2.5 TABLET ORAL at 18:53

## 2019-03-06 RX ADMIN — GABAPENTIN 600 MG: 300 CAPSULE ORAL at 09:17

## 2019-03-06 RX ADMIN — INSULIN LISPRO 2 UNITS: 100 INJECTION, SOLUTION INTRAVENOUS; SUBCUTANEOUS at 12:39

## 2019-03-06 RX ADMIN — BACLOFEN 10 MG: 10 TABLET ORAL at 09:17

## 2019-03-06 RX ADMIN — MIRTAZAPINE 7.5 MG: 15 TABLET, FILM COATED ORAL at 23:16

## 2019-03-06 RX ADMIN — LACTOBACILLUS TAB 1 TABLET: TAB at 09:17

## 2019-03-06 RX ADMIN — MULTIVITAMIN TABLET 1 TABLET: TABLET at 09:18

## 2019-03-06 NOTE — PROGRESS NOTES
Problem: Falls - Risk of  Goal: *Absence of Falls  Document Savannah Fall Risk and appropriate interventions in the flowsheet. Outcome: Progressing Towards Goal  Fall Risk Interventions:       Mentation Interventions: Adequate sleep, hydration, pain control, Bed/chair exit alarm, More frequent rounding    Medication Interventions: Assess postural VS orthostatic hypotension    Elimination Interventions: Call light in reach             Problem: Pressure Injury - Risk of  Goal: *Prevention of pressure injury  Document Sarwat Scale and appropriate interventions in the flowsheet. Outcome: Progressing Towards Goal  Pressure Injury Interventions:  Sensory Interventions: Assess changes in LOC, Float heels, Minimize linen layers    Moisture Interventions: Assess need for specialty bed, Limit adult briefs, Minimize layers, Offer toileting Q_hr    Activity Interventions: Assess need for specialty bed, PT/OT evaluation, Pressure redistribution bed/mattress(bed type)    Mobility Interventions: HOB 30 degrees or less, Float heels, Assess need for specialty bed, Pressure redistribution bed/mattress (bed type), Turn and reposition approx. every two hours(pillow and wedges)    Nutrition Interventions: Document food/fluid/supplement intake    Friction and Shear Interventions: Apply protective barrier, creams and emollients, HOB 30 degrees or less, Lift sheet, Minimize layers               Problem: Impaired Skin Integrity/Pressure Injury Treatment  Goal: *Prevention of pressure injury  Document Sarwat Scale and appropriate interventions in the flowsheet.   Outcome: Progressing Towards Goal  Pressure Injury Interventions:  Sensory Interventions: Assess changes in LOC, Float heels, Minimize linen layers    Moisture Interventions: Assess need for specialty bed, Limit adult briefs, Minimize layers, Offer toileting Q_hr    Activity Interventions: Assess need for specialty bed, PT/OT evaluation, Pressure redistribution bed/mattress(bed type)    Mobility Interventions: HOB 30 degrees or less, Float heels, Assess need for specialty bed, Pressure redistribution bed/mattress (bed type), Turn and reposition approx.  every two hours(pillow and wedges)    Nutrition Interventions: Document food/fluid/supplement intake    Friction and Shear Interventions: Apply protective barrier, creams and emollients, HOB 30 degrees or less, Lift sheet, Minimize layers

## 2019-03-06 NOTE — PROGRESS NOTES
Problem: Falls - Risk of  Goal: *Absence of Falls  Document Savannah Fall Risk and appropriate interventions in the flowsheet. Outcome: Progressing Towards Goal  Fall Risk Interventions:       Mentation Interventions: Adequate sleep, hydration, pain control    Medication Interventions: Assess postural VS orthostatic hypotension    Elimination Interventions: Call light in reach             Problem: Pressure Injury - Risk of  Goal: *Prevention of pressure injury  Document Sarwat Scale and appropriate interventions in the flowsheet. Outcome: Progressing Towards Goal  Pressure Injury Interventions:  Sensory Interventions: Assess changes in LOC    Moisture Interventions: Assess need for specialty bed    Activity Interventions: Assess need for specialty bed    Mobility Interventions: HOB 30 degrees or less    Nutrition Interventions: Document food/fluid/supplement intake    Friction and Shear Interventions: Apply protective barrier, creams and emollients               Problem: Impaired Skin Integrity/Pressure Injury Treatment  Goal: *Prevention of pressure injury  Document Sarwat Scale and appropriate interventions in the flowsheet.   Outcome: Progressing Towards Goal  Pressure Injury Interventions:  Sensory Interventions: Assess changes in LOC    Moisture Interventions: Assess need for specialty bed    Activity Interventions: Assess need for specialty bed    Mobility Interventions: HOB 30 degrees or less    Nutrition Interventions: Document food/fluid/supplement intake    Friction and Shear Interventions: Apply protective barrier, creams and emollients

## 2019-03-06 NOTE — ROUTINE PROCESS
Assume care of patient from [facility]. Patient received in bed awake. Patient A&Ox4, denies pain and discomfort, patient has emergency trach supplies at bedside. No distress noted. Frequently use items within reach. Bed locked in low position. Call bell within reach and patient verbalized understanding of use for assistance and needs. 0657- Incontinent care performed, mepliex changed on sacrum area with use of DWC. No distress noted. 1752- Incontinent care performed, ileostomy bag emptied, 300 cc of stool noted. No distress noted.

## 2019-03-06 NOTE — PROGRESS NOTES
-0845-Received patient on 28% Trach collar. O2 Sats-100% HR-88, Rr-20. Alessia Aschoff is noted to be patent and secure. Respiratory will continue to monitor. -[facility]    -1640-Pt. Remains on 28% Trach collar. O2 Sats-99% HR-101, RR-20. Alessia Aschoff remains patent and secure. Drainage sponge and Inner cannula changed. -[address]

## 2019-03-06 NOTE — PROGRESS NOTES
Chart reviewed. Heather Hernandez in admission from [facility], [facility]. will be coming over this afternoon to meet with & assess for possible admission. Pt is aware & agreeable. Will cont to follow. Mari Cheney RN,ext [phone].

## 2019-03-06 NOTE — PROGRESS NOTES
Madison Aguiar in admissions from [address] [place] [address] came to assess pt for possible admission, he thinks pt will be good for admission but has to check with  & get back to me. Dr. Antwan Casanova made aware of above. Pat [address] [facility], ext [phone].

## 2019-03-06 NOTE — PROGRESS NOTES
[address] pt care from [facility]. Pt in bed, alert and oriented x 4. Not in any form of distress. Denies pain. Frequent use items and call bell within reach. Verbalized understanding to call for assistance. Bed locked in lowest position. Ostomy bag changed. Incontinence care completed. Condom cath replaced. 0720 - Bedside and Verbal shift change report given to ANGEL Philippe (oncoming nurse) by Richardson Mullins RN (offgoing nurse). Report included the following information SBAR, Kardex, Intake/Output, MAR and Recent Results.

## 2019-03-06 NOTE — CDMP QUERY
The medical record reflects the following:      PCCM documented on 2/27- the following dx   Pneumonia with septic shock. Acute Hypercarbic/Hypoxic Respiratory Failure-likely due to left basilar atelectasis and probable pneumonia     Neither of these 2 dx has been noted in the IM attending notes     Can you please document  if you  concur  with either/both   diagnoses  OR  Can you please document if either or both dx are ruled out and were not present    =>Other Explanation of clinical findings  =>Unable to Determine (no explanation of clinical findings)    Please clarify and document your clinical opinion in the progress notes and discharge summary.     Thank you,  [address] [facility], [address] [place] [address]

## 2019-03-06 NOTE — PROGRESS NOTES
3/5/19  2330-Cough Assist therapy was performed I:40/E:40 breaths x 5 cycles. Sxn Large white secretions. Patient's vitals were stable through out the procedure. 3/6/19  0500-Cough Assist therapy was performed I:40/E:40 breaths x 5 cycles. Sxn Large white secretions.   Patient's vitals were stable through out the procedure

## 2019-03-07 PROBLEM — R65.21 SEPTIC SHOCK (HCC): Status: ACTIVE | Noted: 2019-03-07

## 2019-03-07 PROBLEM — J96.22 ACUTE ON CHRONIC RESPIRATORY FAILURE WITH HYPOXIA AND HYPERCAPNIA (HCC): Status: ACTIVE | Noted: 2019-03-07

## 2019-03-07 PROBLEM — A41.9 SEPTIC SHOCK (HCC): Status: ACTIVE | Noted: 2019-03-07

## 2019-03-07 PROBLEM — J96.21 ACUTE ON CHRONIC RESPIRATORY FAILURE WITH HYPOXIA AND HYPERCAPNIA (HCC): Status: ACTIVE | Noted: 2019-03-07

## 2019-03-07 LAB
GLUCOSE BLD STRIP.AUTO-MCNC: 101 MG/DL (ref 70–110)
GLUCOSE BLD STRIP.AUTO-MCNC: 95 MG/DL (ref 70–110)
GLUCOSE BLD STRIP.AUTO-MCNC: 96 MG/DL (ref 70–110)
GLUCOSE BLD STRIP.AUTO-MCNC: 97 MG/DL (ref 70–110)

## 2019-03-07 PROCEDURE — 82962 GLUCOSE BLOOD TEST: CPT

## 2019-03-07 PROCEDURE — 89220 SPUTUM SPECIMEN COLLECTION: CPT

## 2019-03-07 PROCEDURE — 77030020186 HC BOOT HL PROTCT SAGE -B

## 2019-03-07 PROCEDURE — 74011250637 HC RX REV CODE- 250/637: Performed by: FAMILY MEDICINE

## 2019-03-07 PROCEDURE — 65270000029 HC RM PRIVATE

## 2019-03-07 PROCEDURE — 94760 N-INVAS EAR/PLS OXIMETRY 1: CPT

## 2019-03-07 PROCEDURE — 94668 MNPJ CHEST WALL SBSQ: CPT

## 2019-03-07 PROCEDURE — 77010033678 HC OXYGEN DAILY

## 2019-03-07 RX ORDER — FENTANYL 25 UG/1
1 PATCH TRANSDERMAL
Qty: 3 PATCH | Refills: 0 | Status: SHIPPED | OUTPATIENT
Start: 2019-03-10 | End: 2019-04-09

## 2019-03-07 RX ADMIN — PANTOPRAZOLE SODIUM 20 MG: 20 TABLET, DELAYED RELEASE ORAL at 10:26

## 2019-03-07 RX ADMIN — LEVETIRACETAM 500 MG: 500 TABLET, EXTENDED RELEASE ORAL at 10:25

## 2019-03-07 RX ADMIN — BACLOFEN 10 MG: 10 TABLET ORAL at 18:49

## 2019-03-07 RX ADMIN — MULTIVITAMIN TABLET 1 TABLET: TABLET at 10:28

## 2019-03-07 RX ADMIN — GABAPENTIN 600 MG: 300 CAPSULE ORAL at 21:43

## 2019-03-07 RX ADMIN — MIDODRINE HYDROCHLORIDE 5 MG: 2.5 TABLET ORAL at 10:26

## 2019-03-07 RX ADMIN — BACLOFEN 10 MG: 10 TABLET ORAL at 10:28

## 2019-03-07 RX ADMIN — GABAPENTIN 600 MG: 300 CAPSULE ORAL at 10:26

## 2019-03-07 RX ADMIN — MIDODRINE HYDROCHLORIDE 5 MG: 2.5 TABLET ORAL at 14:07

## 2019-03-07 RX ADMIN — BUPROPION HYDROCHLORIDE 150 MG: 150 TABLET, EXTENDED RELEASE ORAL at 10:26

## 2019-03-07 RX ADMIN — GABAPENTIN 600 MG: 300 CAPSULE ORAL at 18:49

## 2019-03-07 RX ADMIN — LACTOBACILLUS TAB 1 TABLET: TAB at 10:26

## 2019-03-07 RX ADMIN — DULOXETINE 60 MG: 60 CAPSULE, DELAYED RELEASE ORAL at 10:27

## 2019-03-07 RX ADMIN — MIRTAZAPINE 7.5 MG: 15 TABLET, FILM COATED ORAL at 21:43

## 2019-03-07 NOTE — PROGRESS NOTES
Patient currently on 28% heated TC at 6 lpm. Cough assist given I:40/E:40, 5 breaths x 9 cycles. Suctioned a copious amount of thick white/clear secretions. Trach care done: changed trach tie and inner cannula. No resp distress or complications noted.

## 2019-03-07 NOTE — PROGRESS NOTES
tolerated cough assist +[id number]/-[id number]. 5 cycles of 5 breaths with suction until breath sounds cleared.

## 2019-03-07 NOTE — DISCHARGE SUMMARY
Discharge Summary    Patient: Rocio Villa               Sex: male          DOA: 2/26/2019         YOB: 1980      Age:  34 y.o.        LOS:  LOS: 6 days                Admit Date: 2/26/2019    Discharge Date: 3/7/2019    Admission Diagnoses: Unresponsive episode [R41.89]  Hypotension [I95.9]  Unresponsive episode [R41.89]  Hypotension [I95.9]  Unresponsiveness [R41.89]    Discharge Diagnoses:    Problem List as of 3/7/2019 Never Reviewed          Codes Class Noted - Resolved    * (Principal) Unresponsive episode ICD-10-CM: R41.89  ICD-9-CM: 780.09  2/27/2019 - Present        Sepsis ([facility] ICD-10-CM: A41.9  ICD-9-CM: 038.9, 995.91  2/23/2019 - Present        Metabolic encephalopathy FRENCH-67-AA: G93.41  ICD-9-CM: 348.31  3/1/2019 - Present        Hypotension ICD-10-CM: I95.9  ICD-9-CM: 458.9  2/27/2019 - Present        UTI (urinary tract infection) ICD-10-CM: N39.0  ICD-9-CM: 599.0  2/23/2019 - Present        Acute on chronic respiratory failure with hypoxia (HCC) ICD-10-CM: J96.21  ICD-9-CM: 518.84, 799.02  2/23/2019 - Present        Moderate malnutrition ([facility] ICD-10-CM: E44.0  ICD-9-CM: 263.0  3/1/2019 - Present        DM (diabetes mellitus) ([facility] ICD-10-CM: E11.9  ICD-9-CM: 250.00  2/23/2019 - Present        Seizures ([facility] ICD-10-CM: R56.9  ICD-9-CM: 780.39  2/23/2019 - Present        Quadriplegia ([facility] ICD-10-CM: G82.50  ICD-9-CM: 344.00  2/23/2019 - Present        GERD (gastroesophageal reflux disease) ICD-10-CM: K21.9  ICD-9-CM: 530.81  2/23/2019 - Present        Septic shock ([address] ICD-10-CM: A41.9, R65.21  ICD-9-CM: 038.9, 785.52, 995.92  3/7/2019 - Present    Overview Signed 3/7/2019  8:59 AM by Harriet Castellanos MD     Present on admission, improved             Acute on chronic respiratory failure with hypoxia and hypercapnia (HCC) ICD-10-CM: J96.21, J96.22  ICD-9-CM: 518.84, 786.09, 799.02  3/7/2019 - Present    Overview Signed 3/7/2019  9:00 AM by Harriet Castellanos MD     Present on admission, improved             Unresponsiveness ICD-10-CM: R41.89  ICD-9-CM: 780.09  3/1/2019 - Present              Discharge Condition:  Improved    Hospital Course:  Mr Edgar Rangel is a 34year old male who presented to the ER with unresponsiveness and acute hypoxic respiratory failure. He had recently been hospitalized and there was potential for Aspiration with potential pneumonia on admission. He was admitted to the ICU for ongoing management. Mr Edgar Rangel improved after tracheostomy change. His respiratory status improved markedly. It is thought that he could potentially have had aspiration, but at the the time of discharge there is no evidence for active pneumonia. Atelectasis could also have contributed to his respiratory failure. At this point he is significantly improved and is appropriate for transfer to SNF. Consults: Critical care   Consulting Provider: Gurdeep Styles MD    Discharge Medications:     Current Discharge Medication List      CONTINUE these medications which have CHANGED    Details   !! fentaNYL (DURAGESIC) 25 mcg/hr PATCH 1 Patch by TransDERmal route every seventy-two (72) hours for 30 days. Max Daily Amount: 1 Patch. Qty: 3 Patch, Refills: 0    Associated Diagnoses: Quadriplegia ([facility]       ! ! - Potential duplicate medications found. Please discuss with provider. CONTINUE these medications which have NOT CHANGED    Details   multivitamin (DAILY MULTIPLE) tablet Take 1 Tab by mouth daily. !! collagenase (SANTYL) 250 unit/gram ointment Apply  to affected area daily. baclofen (LIORESAL) 20 mg tablet       buPROPion SR (WELLBUTRIN SR) 150 mg SR tablet       DULoxetine (CYMBALTA) 60 mg capsule       !! fentaNYL (DURAGESIC) 25 mcg/hr PATCH       gabapentin (NEURONTIN) 600 mg tablet       glipiZIDE SR (GLUCOTROL XL) 10 mg CR tablet       cyclobenzaprine (FLEXERIL) 10 mg tablet Take  by mouth three (3) times daily as needed for Muscle Spasm(s).       levETIRAcetam (KEPPRA XR) 500 mg ER tablet Take 500 mg by mouth daily. metFORMIN (GLUCOPHAGE) 500 mg tablet Take  by mouth two (2) times daily (with meals). omeprazole (PRILOSEC) 20 mg capsule Take 20 mg by mouth daily. amino acids/protein hydrolys (PRO-STAT SUGAR FREE PO) Take  by mouth. !! collagenase (SANTYL) 250 unit/gram ointment Apply  to affected area daily. acetaminophen (TYLENOL) 325 mg tablet Take 650 mg by mouth every four (4) hours as needed for Pain. lactobacillus acidoph-pectin (ACIDOPHILUS-PECTIN) 75 million cell -100 mg cap capsule Take 1 Cap by mouth daily. mirtazapine (REMERON) 7.5 mg tablet Take 7.5 mg by mouth nightly. !! - Potential duplicate medications found. Please discuss with provider.       STOP taking these medications       levoFLOXacin (LEVAQUIN) 750 mg tablet Comments:   Reason for Stopping:               Activity: Quadriplegic    Diet: Diabetic Diet    Wound Care: Ongoing skin care required for DTI's which were present on admission    Follow-up: Follow up with staff MD on arrival.

## 2019-03-07 NOTE — PROGRESS NOTES
Cough assist given, I:40/E:40 5 breaths x 6 cycles. Suctioned copious amount of thick, white/clear secretions. Vitals remained stable throughout therapy. No resp distress or complications noted.

## 2019-03-07 NOTE — PROGRESS NOTES
Problem: Falls - Risk of  Goal: *Absence of Falls  Document Savannah Fall Risk and appropriate interventions in the flowsheet. Outcome: Progressing Towards Goal  Fall Risk Interventions:       Mentation Interventions: Adequate sleep, hydration, pain control    Medication Interventions: Patient to call before getting OOB, Teach patient to arise slowly    Elimination Interventions: Call light in reach, Patient to call for help with toileting needs, Toileting schedule/hourly rounds             Problem: Pressure Injury - Risk of  Goal: *Prevention of pressure injury  Document Sarwat Scale and appropriate interventions in the flowsheet. Outcome: Progressing Towards Goal  Pressure Injury Interventions:  Sensory Interventions: Assess changes in LOC, Avoid rigorous massage over bony prominences, Float heels, Keep linens dry and wrinkle-free, Maintain/enhance activity level, Minimize linen layers, Monitor skin under medical devices, Pressure redistribution bed/mattress (bed type), Suspension boots, Turn and reposition approx. every two hours (pillows and wedges if needed)    Moisture Interventions: Absorbent underpads, Apply protective barrier, creams and emollients, Internal/External urinary devices, Minimize layers, Moisture barrier    Activity Interventions: Pressure redistribution bed/mattress(bed type)    Mobility Interventions: Pressure redistribution bed/mattress (bed type)    Nutrition Interventions: Document food/fluid/supplement intake    Friction and Shear Interventions: Apply protective barrier, creams and emollients, Foam dressings/transparent film/skin sealants, HOB 30 degrees or less, Minimize layers, Transferring/repositioning devices               Problem: Impaired Skin Integrity/Pressure Injury Treatment  Goal: *Prevention of pressure injury  Document Sarwat Scale and appropriate interventions in the flowsheet.   Outcome: Progressing Towards Goal  Pressure Injury Interventions:  Sensory Interventions: Assess changes in LOC, Avoid rigorous massage over bony prominences, Float heels, Keep linens dry and wrinkle-free, Maintain/enhance activity level, Minimize linen layers, Monitor skin under medical devices, Pressure redistribution bed/mattress (bed type), Suspension boots, Turn and reposition approx.  every two hours (pillows and wedges if needed)    Moisture Interventions: Absorbent underpads, Apply protective barrier, creams and emollients, Internal/External urinary devices, Minimize layers, Moisture barrier    Activity Interventions: Pressure redistribution bed/mattress(bed type)    Mobility Interventions: Pressure redistribution bed/mattress (bed type)    Nutrition Interventions: Document food/fluid/supplement intake    Friction and Shear Interventions: Apply protective barrier, creams and emollients, Foam dressings/transparent film/skin sealants, HOB 30 degrees or less, Minimize layers, Transferring/repositioning devices

## 2019-03-07 NOTE — PROGRESS NOTES
Cough assist given, I:40/E:40 5 breaths x 3 cycles. Suctioned large amount of thick, clear/white secretions. No resp distress or complications noted.

## 2019-03-07 NOTE — ROUTINE PROCESS
Assume care of patient from [place], [address] [place] [address]. Patient received in bed awake. Patient A&Ox4, denies pain and discomfort. No distress noted. Prevolon boots in place bilaterally. SCD's present. Frequently use items within reach. Bed locked in low position. Call bell within reach and patient verbalized understanding of use for assistance and needs. 0763- Informed MD of patient's blood pressure which was 156/106 manually and 146/95 with vital sign machine. Held Midodrine as this is the first time patient's blood pressure was above 150 for SBP. No distress noted.

## 2019-03-07 NOTE — PROGRESS NOTES
1930: Assumed pt care from [address]. Received pt resting in bed, pt is alert and oriented x 4. Denies any pain or discomfort at this time. No signs of distress. Trach collar, clean dry and intact. 3-7-19    3235: drained 100 mL of loose stool from colostomy bag, site care done, changed colostomy bag. Bathed patient. Gown changed, linens changed. New condom catheter applied. 4215: Bedside and Verbal shift change report given to Rola Shetty (oncoming nurse) by Denys Velasquez   (offgoing nurse). Report included the following information SBAR, Intake/Output, MAR and Recent Results.

## 2019-03-07 NOTE — ROUTINE PROCESS
Bedside and Verbal shift change report given to Horacio Carranza RN  (oncoming nurse) by Nasir Pedraza RN (offgoing nurse). Report included the following information SBAR, Kardex, Intake/Output, MAR and Recent Results.

## 2019-03-07 NOTE — PROGRESS NOTES
completed follow up visit with patient in room 2105 today and listened as he talked about his possibly leaving here today to go to a rehab place and a Spiritual assessment of patient was done. . Chaplains will continue to follow and will provide pastoral care on an as needed/requested basis    Chaplain Derrek Childress   Board Certified [facility]   [phone]

## 2019-03-07 NOTE — PROGRESS NOTES
Spoke with Parvez Zapien in admissions @ [facility], states his  has declined to accept pt. Left  for admissions @ [address] received message back declined pt stating too complex. [facility] with [facility] @ [address], states they normally do not accept trach pt's but as trach is 5 yrs old will check with nursing & get back to me. Met with pt & made him aware of above & that if unable to accept would have to return to [facility], states he understands & is agreeable. Mari Cheney,RN,ext [phone].

## 2019-03-07 NOTE — PROGRESS NOTES
Problem: Falls - Risk of  Goal: *Absence of Falls  Document [place] Fall Risk and appropriate interventions in the flowsheet. Outcome: Progressing Towards Goal  Fall Risk Interventions:       Mentation Interventions: Adequate sleep, hydration, pain control, Door open when patient unattended, More frequent rounding, Toileting rounds    Medication Interventions: Evaluate medications/consider consulting pharmacy    Elimination Interventions: Call light in reach, Patient to call for help with toileting needs, Toileting schedule/hourly rounds             Problem: Pressure Injury - Risk of  Goal: *Prevention of pressure injury  Document Sarwat Scale and appropriate interventions in the flowsheet. Outcome: Progressing Towards Goal  Pressure Injury Interventions:  Sensory Interventions: Assess changes in LOC, Pressure redistribution bed/mattress (bed type), Turn and reposition approx. every two hours (pillows and wedges if needed)    Moisture Interventions: Absorbent underpads, Check for incontinence Q2 hours and as needed, Maintain skin hydration (lotion/cream), Moisture barrier, Internal/External urinary devices    Activity Interventions: Pressure redistribution bed/mattress(bed type), PT/OT evaluation    Mobility Interventions: HOB 30 degrees or less, Pressure redistribution bed/mattress (bed type), PT/OT evaluation, Turn and reposition approx. every two hours(pillow and wedges)    Nutrition Interventions: Document food/fluid/supplement intake    Friction and Shear Interventions: HOB 30 degrees or less               Problem: Impaired Skin Integrity/Pressure Injury Treatment  Goal: *Prevention of pressure injury  Document Sarwat Scale and appropriate interventions in the flowsheet. Outcome: Progressing Towards Goal  Pressure Injury Interventions:  Sensory Interventions: Assess changes in LOC, Pressure redistribution bed/mattress (bed type), Turn and reposition approx.  every two hours (pillows and wedges if needed)    Moisture Interventions: Absorbent underpads, Check for incontinence Q2 hours and as needed, Maintain skin hydration (lotion/cream), Moisture barrier, Internal/External urinary devices    Activity Interventions: Pressure redistribution bed/mattress(bed type), PT/OT evaluation    Mobility Interventions: HOB 30 degrees or less, Pressure redistribution bed/mattress (bed type), PT/OT evaluation, Turn and reposition approx.  every two hours(pillow and wedges)    Nutrition Interventions: Document food/fluid/supplement intake    Friction and Shear Interventions: HOB 30 degrees or less

## 2019-03-07 NOTE — PROGRESS NOTES
Progress Note    Late Entry:  Patient seen and managed 3/6/19. Note entered as late entry. Patient: Clarence Barajas               Sex: male          DOA: 2/26/2019       YOB: 1980      Age:  34 y.o.        LOS:  LOS: 6 days             CHIEF COMPLAINT:  Pneumonia    Subjective:     Patient awake and interactive  No distress    Objective:      Visit Vitals  /74 (BP 1 Location: Right arm, BP Patient Position: Supine)   Pulse 92   Temp 98 °F (36.7 °C)   Resp 16   Ht 5' 7\" (1.702 m)   Wt 61 kg (134 lb 7.7 oz)   SpO2 96%   BMI 21.06 kg/m²       Physical Exam:  Gen:  No distress, no complaint  Lungs:  Clear bilaterally, no wheeze or rhonchi  Heart:  Regular rate and rhythm, no murmurs or gallops  Abdomen:  Soft, non-tender, normal bowel sounds        Lab/Data Reviewed: All lab results for the last 24 hours reviewed. Assessment/Plan     Principal Problem:    Unresponsive episode (2/27/2019)    Active Problems:    Hypotension (2/77/4205)      Metabolic encephalopathy (8/1/4178)      DM (diabetes mellitus) ([address] (2/23/2019)      Seizures ([address] (2/23/2019)      Moderate malnutrition ([address] (3/1/2019)      Quadriplegia ([address] (2/23/2019)      Septic shock ([address] (3/7/2019)      Overview: Present on admission, improved      Acute on chronic respiratory failure with hypoxia and hypercapnia ([address] (3/7/2019)      Overview: Present on admission, improved        Plan:  Follow respiratory status  BS control  BP control  Working toward disposition.

## 2019-03-08 LAB
ABO + RH BLD: NORMAL
BLOOD GROUP ANTIBODIES SERPL: NORMAL
GLUCOSE BLD STRIP.AUTO-MCNC: 121 MG/DL (ref 70–110)
GLUCOSE BLD STRIP.AUTO-MCNC: 157 MG/DL (ref 70–110)
GLUCOSE BLD STRIP.AUTO-MCNC: 83 MG/DL (ref 70–110)
SPECIMEN EXP DATE BLD: NORMAL

## 2019-03-08 PROCEDURE — 74011636637 HC RX REV CODE- 636/637: Performed by: FAMILY MEDICINE

## 2019-03-08 PROCEDURE — 82962 GLUCOSE BLOOD TEST: CPT

## 2019-03-08 PROCEDURE — 74011250637 HC RX REV CODE- 250/637: Performed by: FAMILY MEDICINE

## 2019-03-08 PROCEDURE — 77030037878 HC DRSG MEPILEX >48IN BORD MOLN -B

## 2019-03-08 PROCEDURE — 77030010545

## 2019-03-08 PROCEDURE — 36415 COLL VENOUS BLD VENIPUNCTURE: CPT

## 2019-03-08 PROCEDURE — 94760 N-INVAS EAR/PLS OXIMETRY 1: CPT

## 2019-03-08 PROCEDURE — 86900 BLOOD TYPING SEROLOGIC ABO: CPT

## 2019-03-08 PROCEDURE — 65270000029 HC RM PRIVATE

## 2019-03-08 PROCEDURE — 89220 SPUTUM SPECIMEN COLLECTION: CPT

## 2019-03-08 PROCEDURE — 77010033678 HC OXYGEN DAILY

## 2019-03-08 PROCEDURE — 94668 MNPJ CHEST WALL SBSQ: CPT

## 2019-03-08 RX ADMIN — DULOXETINE 60 MG: 60 CAPSULE, DELAYED RELEASE ORAL at 08:51

## 2019-03-08 RX ADMIN — MIDODRINE HYDROCHLORIDE 5 MG: 2.5 TABLET ORAL at 17:01

## 2019-03-08 RX ADMIN — PANTOPRAZOLE SODIUM 20 MG: 20 TABLET, DELAYED RELEASE ORAL at 08:52

## 2019-03-08 RX ADMIN — GABAPENTIN 600 MG: 300 CAPSULE ORAL at 08:51

## 2019-03-08 RX ADMIN — MIDODRINE HYDROCHLORIDE 5 MG: 2.5 TABLET ORAL at 12:43

## 2019-03-08 RX ADMIN — BUPROPION HYDROCHLORIDE 150 MG: 150 TABLET, EXTENDED RELEASE ORAL at 08:51

## 2019-03-08 RX ADMIN — GABAPENTIN 600 MG: 300 CAPSULE ORAL at 21:16

## 2019-03-08 RX ADMIN — GABAPENTIN 600 MG: 300 CAPSULE ORAL at 17:01

## 2019-03-08 RX ADMIN — LEVETIRACETAM 500 MG: 500 TABLET, EXTENDED RELEASE ORAL at 08:51

## 2019-03-08 RX ADMIN — LACTOBACILLUS TAB 1 TABLET: TAB at 08:51

## 2019-03-08 RX ADMIN — INSULIN LISPRO 2 UNITS: 100 INJECTION, SOLUTION INTRAVENOUS; SUBCUTANEOUS at 16:40

## 2019-03-08 RX ADMIN — MIRTAZAPINE 7.5 MG: 15 TABLET, FILM COATED ORAL at 21:15

## 2019-03-08 RX ADMIN — BACLOFEN 10 MG: 10 TABLET ORAL at 08:52

## 2019-03-08 RX ADMIN — MIDODRINE HYDROCHLORIDE 5 MG: 2.5 TABLET ORAL at 08:52

## 2019-03-08 RX ADMIN — BACLOFEN 10 MG: 10 TABLET ORAL at 17:25

## 2019-03-08 RX ADMIN — MULTIVITAMIN TABLET 1 TABLET: TABLET at 08:52

## 2019-03-08 NOTE — PROGRESS NOTES
Transition of Care/Discharge Planning    Referral to [facility] in [place].     Joanne Callejas RN    Outcomes Manager  [phone]  [phone]

## 2019-03-08 NOTE — PROGRESS NOTES
Pt in bed resting alert and oriented x4 denies pain at the moment. Assessement completed plan of care for the shift explained pt verbalized understanding. Pt quadriplegic and  on trach no s /s of distress. HOB elevated, bed low and in locked position. Call light and frequently used items within reach. 1944- RT at bedside. 0000- Pt sleeping.    0300- Pt incontinent of urine bed bath given, wound care done as per the order, wound looks pink no drainage, no odor. Ileostomy care completed, bag changed. Pt made comfortable in bed. Pt tolerated well. Blood bank arm band noted to be soiled with BM, was taken off and shreded. Will notify MD.                                                   Darrin Johns- Received order for type and screen to replace the  arm band only. Called blood bank and informed  Kristian Angeles verbalized understanding. 0720-Bedside and Verbal shift change report given to ANGEL Philippe (oncoming nurse) by Criselda Vargas RN (offgoing nurse). Report included the following information SBAR, Kardex, Intake/Output, MAR and Recent Results.

## 2019-03-08 NOTE — PROGRESS NOTES
NUTRITION follow-up/Plan of care    RECOMMENDATIONS:     1. Diabetic Consistent Carb diet  2. Ensure Enlive TID  3. Monitor weight, labs and PO intake  4. RD to follow    GOALS:     1. Met/Ongoing: PO intake meets >75% of protein/calorie needs by 3/15  2. Ongoing: Maintain weight (+/- 1-2 lb) by 3/15    ASSESSMENT:      Weight status is classified as normal per BMI of 21.1 PO intake adequate (%) per vials. Continue Ensure Enlive supplements to help with wound healing. Labs noted. BG range () over past 24 hours. Nutrition recommendations listed. RD to follow. Nutrition Risk:  []   High []  Moderate [x] Low    SUBJECTIVE/OBJECTIVE:   (3/8): Pt has a good appetite/PO intake (%) per vitals and weight has been stable with UBW in 130s. Pt seen in room at lunch. Reports he is doing well and consuming his meals and supplements. Will monitor. Below per previous RD note:   (3/4): Transferred from ICU. Patient admitted with sepsis and hypotension. PMHx including a tracheostomy and quadriplegia. Patient with unstageable pressure ulcer to sacral/coccyx documented by nursing. Denies having any food allergies or problems chewing/swallowing. Patient reports having a good appetite and eating most of meals. Observed 90% intake of lunch meal during visit and % intake of meals per vitals. States drinking Ensure supplements when he receives them and prefers vanilla flavor.  Encouraged intake and will monitor.      Information Obtained From:   [x] Chart Review  [x] Patient  [] Family/Caregiver  [] Nurse/Physician   [] Patient Rounds/Interdisciplinary Meeting    Diet: Diabetic Consistent Carb diet  Patient Vitals for the past 100 hrs:   % Diet Eaten   03/08/19 0818 95 %   03/07/19 1739 95 %   03/07/19 1200 80 %   03/06/19 1330 75 %   03/05/19 1500 100 %   03/05/19 1215 100 %   03/05/19 0900 100 %   03/04/19 1250 100 %   03/04/19 0834 100 %     Medications: [x] Reviewed   Encounter Diagnoses     ICD-10-CM ICD-9-CM   1. Sepsis, due to unspecified organism ([facility] A41.9 038.9     995.91   2. Hypotension, unspecified hypotension type I95.9 458.9   3.  Quadriplegia ([facility] G82.50 344.00     Past Medical History:   Diagnosis Date    Abnormal body position     Acquired hypotonia     Acute flaccid quadriplegia (HCC)     Acute hypokalemia     Acute left-sided muscle weakness     [facility] status ([facility]     Chronic idiopathic anal pain     Chronic kidney disease due to systemic infection ([facility])     Chronic major depressive disorder, recurrent episode ([facility]     Decubitus ulcer     Diabetes mellitus ([facility]     Esophageal reflux     Seizures ([facility]     Tracheostomy status ([facility]     Urinary tract infection, site not specified      Labs:    Lab Results   Component Value Date/Time    Sodium 136 03/03/2019 05:00 AM    Potassium 4.2 03/03/2019 05:00 AM    Chloride 103 03/03/2019 05:00 AM    CO2 25 03/03/2019 05:00 AM    Anion gap 8 03/03/2019 05:00 AM    Glucose 123 (H) 03/03/2019 05:00 AM    BUN 21 (H) 03/03/2019 05:00 AM    Creatinine 1.01 03/03/2019 05:00 AM    Calcium 9.4 03/03/2019 05:00 AM    Magnesium 1.7 03/02/2019 03:00 AM    Phosphorus 2.4 (L) 03/02/2019 03:00 AM    Albumin 2.8 (L) 02/26/2019 11:30 PM     Anthropometrics: BMI (calculated): 21.1   Last 3 Recorded Weights in this Encounter    03/02/19 0252 03/04/19 0648 03/06/19 0343   Weight: 62.1 kg (137 lb) 59.2 kg (130 lb 9.6 oz) 61 kg (134 lb 7.7 oz)      Ht Readings from Last 1 Encounters:   02/27/19 5' 7\" (1.702 m)     [x]  Variable weights  []  Weight Gain  []  Weight Stable   []  New wt n/a on record     Estimated Nutrition Needs:   1995 Kcals/day  Protein (g): 86 g    Nutrition Problems Identified:   [] Suboptimal PO intake   [] Food Allergies  [] Difficulty chewing/swallowing/poor dentition  [] Constipation/Diarrhea   [] Nausea/Vomiting   [] None  [x] Other: Wound healing    Plan:   [x] Therapeutic Diet  [x]  Obtained/adjusted food preferences/tolerances and/or snacks options   [x]  Continue dietary supplements as prescribed  [] Occupational therapy following for feeding techniques  []  HS snack added   []  Modify diet texture   []  Modify diet for food allergies   []  Assist with menu selection   [x]  Monitor PO intake on meal rounds   [x]  Continue inpatient monitoring and intervention   []  Participated in discharge planning/Interdisciplinary rounds/Team meetings   []  Other:     Education Needs:   [] Not appropriate for teaching at this time due to:   [x] Identified and addressed    Nutrition Monitoring and Evaluation:   [x] Continue inpatient monitoring and interventions    [] Other:     Yoav Toledo

## 2019-03-08 NOTE — PROGRESS NOTES
Problem: Falls - Risk of  Goal: *Absence of Falls  Document [place] Fall Risk and appropriate interventions in the flowsheet. Outcome: Progressing Towards Goal  Fall Risk Interventions:       Mentation Interventions: Bed/chair exit alarm    Medication Interventions: Patient to call before getting OOB, Teach patient to arise slowly    Elimination Interventions: Call light in reach, Toileting schedule/hourly rounds             Problem: Pressure Injury - Risk of  Goal: *Prevention of pressure injury  Document Sarwat Scale and appropriate interventions in the flowsheet. Outcome: Progressing Towards Goal  Pressure Injury Interventions:  Sensory Interventions: Assess changes in LOC, Assess need for specialty bed, Keep linens dry and wrinkle-free, Pressure redistribution bed/mattress (bed type), Sit a 90-degree angle/use footstool if needed, Turn and reposition approx. every two hours (pillows and wedges if needed)    Moisture Interventions: Absorbent underpads, Apply protective barrier, creams and emollients, Check for incontinence Q2 hours and as needed, Internal/External urinary devices, Moisture barrier, Minimize layers    Activity Interventions: Pressure redistribution bed/mattress(bed type)    Mobility Interventions: Pressure redistribution bed/mattress (bed type)    Nutrition Interventions: Document food/fluid/supplement intake    Friction and Shear Interventions: Apply protective barrier, creams and emollients, Foam dressings/transparent film/skin sealants, HOB 30 degrees or less, Minimize layers               Problem: Impaired Skin Integrity/Pressure Injury Treatment  Goal: *Prevention of pressure injury  Document Sarwat Scale and appropriate interventions in the flowsheet.   Outcome: Progressing Towards Goal  Pressure Injury Interventions:  Sensory Interventions: Assess changes in LOC, Assess need for specialty bed, Keep linens dry and wrinkle-free, Pressure redistribution bed/mattress (bed type), Sit a 90-degree angle/use footstool if needed, Turn and reposition approx.  every two hours (pillows and wedges if needed)    Moisture Interventions: Absorbent underpads, Apply protective barrier, creams and emollients, Check for incontinence Q2 hours and as needed, Internal/External urinary devices, Moisture barrier, Minimize layers    Activity Interventions: Pressure redistribution bed/mattress(bed type)    Mobility Interventions: Pressure redistribution bed/mattress (bed type)    Nutrition Interventions: Document food/fluid/supplement intake    Friction and Shear Interventions: Apply protective barrier, creams and emollients, Foam dressings/transparent film/skin sealants, HOB 30 degrees or less, Minimize layers

## 2019-03-08 NOTE — ROUTINE PROCESS
Bedside and Verbal shift change report given to Hortencia Blandon RN (oncoming nurse) by Jeannine Croft RN (offgoing nurse). Report included the following information SBAR, Kardex, Intake/Output, MAR and Recent Results.

## 2019-03-08 NOTE — PROGRESS NOTES
Spoke with Charissa Moore @ [address], states that nursing dept feels like they cannot properly care for pt @ this time & cannot accept pt back. They are rec LTACH level of care. Spoke with Dr. Olga Chase & made him aware of above. Spoke with Tanner Brandon, Director of Care management & made her aware of above, states to send him out to [facility], done. Spoke with pt & made him aware of above, verbalized understanding, states of to speak with his [id number], [phone], called & spoke with her & relayed above info. Available as needed. Mari CheneyRN,ext [phone].

## 2019-03-08 NOTE — PROGRESS NOTES
03/08/2019  Trini  [phone] From [address] [place] [address] in [place]  called and has requested current progress notes, medication sheets and other information. NBA Maurer has sent that information and I left message for [facility] about that. Case Management to follow. 3:57- [facility] and will review information sent. She should know by Monday if he would be a candidate for their facility. If so, we will have to submit [facility] authorization at that point. Any longer term placement would be by them at their [facility].  [address] Management  [phone], Pager [phone]

## 2019-03-08 NOTE — PROGRESS NOTES
Progress Note      Patient: Lissa Forde               Sex: male          DOA: 2/26/2019       YOB: 1980      Age:  34 y.o.        LOS:  LOS: 7 days             CHIEF COMPLAINT:  Acute on chronic respiratory failure improved    Subjective:     Patient awake and alert  Interactive  No distress    Objective:      Visit Vitals  /74 (BP 1 Location: Right arm, BP Patient Position: At rest)   Pulse 90   Temp 98.3 °F (36.8 °C)   Resp 17   Ht 5' 7\" (1.702 m)   Wt 61 kg (134 lb 7.7 oz)   SpO2 96%   BMI 21.06 kg/m²       Physical Exam:  Gen:  No distress, no complaint  Neck:  Trach in place, functioning well  Lungs:  Clear bilaterally, no wheeze or rhonchi  Heart:  Regular rate and rhythm, no murmurs or gallops  Abdomen:  Soft, non-tender, normal bowel sounds        Lab/Data Reviewed:    Labs reviewed        Assessment/Plan     Principal Problem:    Unresponsive episode (2/27/2019)    Active Problems:    Hypotension (1/44/3647)      Metabolic encephalopathy (0/2/9266)      DM (diabetes mellitus) ([address] (2/23/2019)      Seizures ([address] (2/23/2019)      Moderate malnutrition ([address] (3/1/2019)      Quadriplegia ([address] (2/23/2019)      Septic shock ([address] (3/7/2019)      Overview: Present on admission, improved      Acute on chronic respiratory failure with hypoxia and hypercapnia (HCC) (3/7/2019)      Overview: Present on admission, improved        Plan:  Continue with supportive care  His previous facility is currently declining to take him back  BS control  BP control  Working toward disposition.

## 2019-03-08 NOTE — ROUTINE PROCESS
Assume care of patient from Bety Pelayo RN. Patient received in bed resting. Patient A&Ox4, denies pain and discomfort. No distress noted. Frequently use items within reach. Bed locked in low position. Call bell within reach and patient verbalized understanding of use for assistance and needs. SCD's present, prevolon boots in place bilaterally. 8429- Incontinent care provided, condom cath placed, no distress noted.

## 2019-03-08 NOTE — PROGRESS NOTES
received his cough assist Q4 hours through the night. Settings: [id number]. He received 8-10 cycles of 5 breaths each treatment with suction until lungs cleared. He was suctioned for moderate amounts of thick, white-clear secretions each treatment. He tolerated the treatment well each time.  SPO2 97% on 28% trach collar at 6LPM.

## 2019-03-08 NOTE — PROGRESS NOTES
Problem: Falls - Risk of  Goal: *Absence of Falls  Document [place] Fall Risk and appropriate interventions in the flowsheet. Outcome: Progressing Towards Goal  Fall Risk Interventions:       Mentation Interventions: Adequate sleep, hydration, pain control, Door open when patient unattended, More frequent rounding, Toileting rounds    Medication Interventions: Patient to call before getting OOB, Teach patient to arise slowly    Elimination Interventions: Call light in reach, Toileting schedule/hourly rounds             Problem: Pressure Injury - Risk of  Goal: *Prevention of pressure injury  Document Sarwat Scale and appropriate interventions in the flowsheet. Outcome: Progressing Towards Goal  Pressure Injury Interventions:  Sensory Interventions: Assess changes in LOC, Keep linens dry and wrinkle-free, Turn and reposition approx.  every two hours (pillows and wedges if needed), Float heels, Assess need for specialty bed, Minimize linen layers, Monitor skin under medical devices, Pad between skin to skin, Pressure redistribution bed/mattress (bed type), Suspension boots    Moisture Interventions: Apply protective barrier, creams and emollients, Absorbent underpads, Check for incontinence Q2 hours and as needed, Internal/External urinary devices, Minimize layers, Moisture barrier, Internal/External fecal devices    Activity Interventions: Pressure redistribution bed/mattress(bed type)    Mobility Interventions: Pressure redistribution bed/mattress (bed type)    Nutrition Interventions: Document food/fluid/supplement intake    Friction and Shear Interventions: Apply protective barrier, creams and emollients, Foam dressings/transparent film/skin sealants, HOB 30 degrees or less, Lift sheet, Minimize layers, Transferring/repositioning devices               Problem: Impaired Skin Integrity/Pressure Injury Treatment  Goal: *Prevention of pressure injury  Document Sarwat Scale and appropriate interventions in the flowsheet. Outcome: Progressing Towards Goal  Pressure Injury Interventions:  Sensory Interventions: Assess changes in LOC, Keep linens dry and wrinkle-free, Turn and reposition approx.  every two hours (pillows and wedges if needed), Float heels, Assess need for specialty bed, Minimize linen layers, Monitor skin under medical devices, Pad between skin to skin, Pressure redistribution bed/mattress (bed type), Suspension boots    Moisture Interventions: Apply protective barrier, creams and emollients, Absorbent underpads, Check for incontinence Q2 hours and as needed, Internal/External urinary devices, Minimize layers, Moisture barrier, Internal/External fecal devices    Activity Interventions: Pressure redistribution bed/mattress(bed type)    Mobility Interventions: Pressure redistribution bed/mattress (bed type)    Nutrition Interventions: Document food/fluid/supplement intake    Friction and Shear Interventions: Apply protective barrier, creams and emollients, Foam dressings/transparent film/skin sealants, HOB 30 degrees or less, Lift sheet, Minimize layers, Transferring/repositioning devices

## 2019-03-08 NOTE — PROGRESS NOTES
Assumed care of pt from [address]  , RN pt awake in bed A&O x 4 , no distress noted and denies pain. Frequently used items and call bell within reach. Patient verbalized understanding to use call bell for any needs or assistance. Bed locked in lowest position. Jake Kill in place, SCD in place.

## 2019-03-08 NOTE — ROUTINE PROCESS
Bedside and Verbal shift change report given to ANGEL Summers (oncoming nurse) by Heike Morales RN (offgoing nurse). Report included the following information SBAR, Kardex, Intake/Output, MAR and Recent Results.

## 2019-03-09 LAB
GLUCOSE BLD STRIP.AUTO-MCNC: 101 MG/DL (ref 70–110)
GLUCOSE BLD STRIP.AUTO-MCNC: 103 MG/DL (ref 70–110)
GLUCOSE BLD STRIP.AUTO-MCNC: 126 MG/DL (ref 70–110)
GLUCOSE BLD STRIP.AUTO-MCNC: 152 MG/DL (ref 70–110)
GLUCOSE BLD STRIP.AUTO-MCNC: 97 MG/DL (ref 70–110)

## 2019-03-09 PROCEDURE — 77030018836 HC SOL IRR NACL ICUM -A

## 2019-03-09 PROCEDURE — 94761 N-INVAS EAR/PLS OXIMETRY MLT: CPT

## 2019-03-09 PROCEDURE — 94669 MECHANICAL CHEST WALL OSCILL: CPT

## 2019-03-09 PROCEDURE — 65270000029 HC RM PRIVATE

## 2019-03-09 PROCEDURE — 74011250637 HC RX REV CODE- 250/637: Performed by: FAMILY MEDICINE

## 2019-03-09 PROCEDURE — 77030037878 HC DRSG MEPILEX >48IN BORD MOLN -B

## 2019-03-09 PROCEDURE — 74011636637 HC RX REV CODE- 636/637: Performed by: FAMILY MEDICINE

## 2019-03-09 PROCEDURE — 77010033678 HC OXYGEN DAILY

## 2019-03-09 RX ADMIN — GABAPENTIN 600 MG: 300 CAPSULE ORAL at 08:39

## 2019-03-09 RX ADMIN — BACLOFEN 10 MG: 10 TABLET ORAL at 08:39

## 2019-03-09 RX ADMIN — DULOXETINE 60 MG: 60 CAPSULE, DELAYED RELEASE ORAL at 08:39

## 2019-03-09 RX ADMIN — LEVETIRACETAM 500 MG: 500 TABLET, EXTENDED RELEASE ORAL at 08:38

## 2019-03-09 RX ADMIN — INSULIN LISPRO 2 UNITS: 100 INJECTION, SOLUTION INTRAVENOUS; SUBCUTANEOUS at 12:24

## 2019-03-09 RX ADMIN — MIDODRINE HYDROCHLORIDE 5 MG: 2.5 TABLET ORAL at 08:37

## 2019-03-09 RX ADMIN — BACLOFEN 10 MG: 10 TABLET ORAL at 17:24

## 2019-03-09 RX ADMIN — GABAPENTIN 600 MG: 300 CAPSULE ORAL at 22:25

## 2019-03-09 RX ADMIN — BUPROPION HYDROCHLORIDE 150 MG: 150 TABLET, EXTENDED RELEASE ORAL at 08:38

## 2019-03-09 RX ADMIN — GABAPENTIN 600 MG: 300 CAPSULE ORAL at 16:55

## 2019-03-09 RX ADMIN — PANTOPRAZOLE SODIUM 20 MG: 20 TABLET, DELAYED RELEASE ORAL at 08:38

## 2019-03-09 RX ADMIN — MULTIVITAMIN TABLET 1 TABLET: TABLET at 08:38

## 2019-03-09 RX ADMIN — MIRTAZAPINE 7.5 MG: 15 TABLET, FILM COATED ORAL at 22:00

## 2019-03-09 RX ADMIN — MIDODRINE HYDROCHLORIDE 5 MG: 2.5 TABLET ORAL at 12:23

## 2019-03-09 RX ADMIN — LACTOBACILLUS TAB 1 TABLET: TAB at 08:37

## 2019-03-09 NOTE — PROGRESS NOTES
-2028-Received patient on 28% Trach collar. O2 sats-99% HR-89, Rr-20. Deloria Numbers noted to be patent and secure. Ambubag with mask and Spare trachs noted at bedside. Respiratory will continue to monitor. -[address]

## 2019-03-09 NOTE — PROGRESS NOTES
Progress Note      Patient: Emerita Rodriguez               Sex: male          DOA: 2/26/2019       YOB: 1980      Age:  34 y.o.        LOS:  LOS: 8 days             CHIEF COMPLAINT:  Acute on chronic respiratory failure improved    Subjective:     Rhonchorous . Need suctioning    Objective:      Visit Vitals  BP 92/62 (BP 1 Location: Right arm, BP Patient Position: At rest)   Pulse 100   Temp 98.4 °F (36.9 °C)   Resp 18   Ht 5' 7\" (1.702 m)   Wt 46.7 kg (103 lb)   SpO2 98%   BMI 16.13 kg/m²       Physical Exam:  Gen:  No distress, no complaint  Neck:  Trach in place, functioning well  Lungs:  B/l rhonchi  Heart:  Regular rate and rhythm, no murmurs or gallops  Abdomen:  Soft, non-tender, normal bowel sounds        Lab/Data Reviewed:    Labs reviewed        Assessment/Plan     Principal Problem:    Unresponsive episode (2/27/2019)    Active Problems:    DM (diabetes mellitus) ([address] (2/23/2019)      Quadriplegia ([address] (2/23/2019)      Seizures ([address] (2/23/2019)      Hypotension (2/27/2019)      Moderate malnutrition ([address] (2/6/3190)      Metabolic encephalopathy (5/7/5249)      Septic shock ([address] (3/7/2019)      Overview: Present on admission, improved      Acute on chronic respiratory failure with hypoxia and hypercapnia ([address] (3/7/2019)      Overview: Present on admission, improved        Plan:  Awaiting dispo plan  Has been seen by pulm and treated for aspiration pna , URTI, mucous plugging.

## 2019-03-09 NOTE — PROGRESS NOTES
Bedside and Verbal shift change report given to Iesha Cisneros RN  (oncoming nurse) by Sandra Lopez RN (offgoing nurse). Report included the following information SBAR, Kardex, Intake/Output, MAR and Recent Results.

## 2019-03-09 NOTE — PROGRESS NOTES
1925: Assumed patient care. Received report from [address] (offgoing nurse). Report included SBAR, Kardex, and MAR. Patient resting in bed, in no signs of pain or distress. Call light and possessions within reach. Bed in lowest setting.      2115: condom cath accidentally removed; gown changed; pericare performed- condom cath replaced     2130: Blood pressure slightly elevated 154/94- patient recently suctioned; will re-evaluate    2138: Blood sugar 101; confirmed on Accu-check with Regulo Cheema [address] [place] [address] Ne: patient suctioned   2308: heart rate slightly elevated, 108; patient recently suctioned; will re-evaluate     0028: heart rate 92    0535: weight taken; equipment removed from bed

## 2019-03-09 NOTE — PROGRESS NOTES
1015-Check on pt - stable - awake & alert--  --offered suction but pt just suctioned by RN  --verified spare trachs at bedside - BVM  --trach collar on pt, Fio2=28%, 6 lpm Flow - water bottle good  --will follow back for suction    1300-Attempt to administer CPT - pt beginning to eat  --no suction needed at this time - good water bottle    1630--Pt not avail - working with nurse    1800--trach care completed - run cough assist - suction - change inner cannula & drain bandage  --water bottle good

## 2019-03-09 NOTE — PROGRESS NOTES
Pt resting on Trach collar with no resp distress at this time. Alessia Aschoff is secure patent and midline. Pt does not want cough assist at this time. spo2 94% . emergency equipment is at the bedside. Will continue to monitor. 0430 pt awake and received cough assist at this time. Pt tolerated well. Moderate amount of white/clear thick secretions suctioned with CA. Inner cannula changed. Gauze changed. Stoma is clean, trach is secure patent and midline. spo2 96% HR 97 RR 20. Emergency equipmetn remains at the bedside.

## 2019-03-09 NOTE — PROGRESS NOTES
0740: Bedside and Verbal shift change report given to Joan Davidson RN (oncoming nurse) by Neil Frias RN (offgoing nurse). Report included the following information SBAR, Kardex and MAR.

## 2019-03-10 LAB
GLUCOSE BLD STRIP.AUTO-MCNC: 119 MG/DL (ref 70–110)
GLUCOSE BLD STRIP.AUTO-MCNC: 132 MG/DL (ref 70–110)
GLUCOSE BLD STRIP.AUTO-MCNC: 150 MG/DL (ref 70–110)
GLUCOSE BLD STRIP.AUTO-MCNC: 99 MG/DL (ref 70–110)

## 2019-03-10 PROCEDURE — 82962 GLUCOSE BLOOD TEST: CPT

## 2019-03-10 PROCEDURE — 94761 N-INVAS EAR/PLS OXIMETRY MLT: CPT

## 2019-03-10 PROCEDURE — 74011636637 HC RX REV CODE- 636/637: Performed by: FAMILY MEDICINE

## 2019-03-10 PROCEDURE — 77010033678 HC OXYGEN DAILY

## 2019-03-10 PROCEDURE — 74011250637 HC RX REV CODE- 250/637: Performed by: FAMILY MEDICINE

## 2019-03-10 PROCEDURE — 65270000029 HC RM PRIVATE

## 2019-03-10 PROCEDURE — 94668 MNPJ CHEST WALL SBSQ: CPT

## 2019-03-10 RX ADMIN — CYCLOBENZAPRINE HYDROCHLORIDE 10 MG: 10 TABLET, FILM COATED ORAL at 17:09

## 2019-03-10 RX ADMIN — MIDODRINE HYDROCHLORIDE 5 MG: 2.5 TABLET ORAL at 12:32

## 2019-03-10 RX ADMIN — MIDODRINE HYDROCHLORIDE 5 MG: 2.5 TABLET ORAL at 08:26

## 2019-03-10 RX ADMIN — BUPROPION HYDROCHLORIDE 150 MG: 150 TABLET, EXTENDED RELEASE ORAL at 08:27

## 2019-03-10 RX ADMIN — BACLOFEN 10 MG: 10 TABLET ORAL at 18:00

## 2019-03-10 RX ADMIN — INSULIN LISPRO 2 UNITS: 100 INJECTION, SOLUTION INTRAVENOUS; SUBCUTANEOUS at 12:31

## 2019-03-10 RX ADMIN — PANTOPRAZOLE SODIUM 20 MG: 20 TABLET, DELAYED RELEASE ORAL at 08:26

## 2019-03-10 RX ADMIN — MIDODRINE HYDROCHLORIDE 5 MG: 2.5 TABLET ORAL at 20:31

## 2019-03-10 RX ADMIN — GABAPENTIN 600 MG: 300 CAPSULE ORAL at 22:53

## 2019-03-10 RX ADMIN — LACTOBACILLUS TAB 1 TABLET: TAB at 08:27

## 2019-03-10 RX ADMIN — MIRTAZAPINE 7.5 MG: 15 TABLET, FILM COATED ORAL at 22:54

## 2019-03-10 RX ADMIN — GABAPENTIN 600 MG: 300 CAPSULE ORAL at 17:10

## 2019-03-10 RX ADMIN — LEVETIRACETAM 500 MG: 500 TABLET, EXTENDED RELEASE ORAL at 08:27

## 2019-03-10 RX ADMIN — BACLOFEN 10 MG: 10 TABLET ORAL at 08:28

## 2019-03-10 RX ADMIN — MULTIVITAMIN TABLET 1 TABLET: TABLET at 08:26

## 2019-03-10 RX ADMIN — DULOXETINE 60 MG: 60 CAPSULE, DELAYED RELEASE ORAL at 08:27

## 2019-03-10 RX ADMIN — GABAPENTIN 600 MG: 300 CAPSULE ORAL at 08:27

## 2019-03-10 NOTE — PROGRESS NOTES
Progress Note      Patient: Susie Barrera               Sex: male          DOA: 2/26/2019       YOB: 1980      Age:  34 y.o.        LOS:  LOS: 9 days             CHIEF COMPLAINT:  Acute on chronic respiratory failure improved    Subjective:     Feeling good this am, no complaints    Objective:      Visit Vitals  /88   Pulse 96   Temp 98.2 °F (36.8 °C)   Resp 18   Ht 5' 7\" (1.702 m)   Wt 46.7 kg (103 lb)   SpO2 96%   BMI 16.13 kg/m²       Physical Exam:  Gen:  No distress, no complaint  Neck:  Trach in place, functioning well  Lungs:  CTAB  Heart:  Regular rate and rhythm, no murmurs or gallops  Abdomen:  Soft, non-tender, normal bowel sounds        Lab/Data Reviewed:    Labs reviewed        Assessment/Plan     Principal Problem:    Unresponsive episode (2/27/2019)    Active Problems:    DM (diabetes mellitus) ([address] (2/23/2019)      Quadriplegia ([address] (2/23/2019)      Seizures ([address] (2/23/2019)      Hypotension (2/27/2019)      Moderate malnutrition ([address] (1/6/9566)      Metabolic encephalopathy (5/7/1287)      Septic shock ([address] (3/7/2019)      Overview: Present on admission, improved      Acute on chronic respiratory failure with hypoxia and hypercapnia (HCC) (3/7/2019)      Overview: Present on admission, improved        Plan:  Awaiting dispo plan  Has been seen by pulm and treated for aspiration pna , URTI, mucous plugging.

## 2019-03-10 NOTE — PROGRESS NOTES
3161  Assume care of patient from Robert Coto RN pt awake in bed, A&Ox4  ,no distress noted and denies pain. Frequently used items and call bell within reach. Pt verbalized understanding to use call bell for any needs or assistance. Bed lock in lowest position. [address]  Dr. Amena Galeana paged to obtain permission to place a cabrera catheter for incontinence and pressure wounds to his bottom. We were using a condom cath but the skin has become irritated. [address]  Dr. Amena Galeana returned call. Dr. Tabby Garcia a cabrera was not needed, no new orders at this time.

## 2019-03-10 NOTE — PROGRESS NOTES
Pt awake and comfortable on 28% trach collar with no resp distress at this time. Suctioned with cough assist moderated clear/white thick secretions. tolerated well by pt. spo2 99%  RR 18. Lehighton Kemps is secure patent and midline. Inner canula changed and stoma is clean. Back up trachs/emertgency equipment is at the bedside. Will continue to monitor.

## 2019-03-10 NOTE — PROGRESS NOTES
Problem: Falls - Risk of  Goal: *Absence of Falls  Document [place] Fall Risk and appropriate interventions in the flowsheet. Fall Risk Interventions:       Mentation Interventions: Door open when patient unattended    Medication Interventions: Patient to call before getting OOB    Elimination Interventions: Call light in reach             Problem: Pressure Injury - Risk of  Goal: *Prevention of pressure injury  Document Sarwat Scale and appropriate interventions in the flowsheet. Outcome: Progressing Towards Goal  Pressure Injury Interventions:  Sensory Interventions: Assess changes in LOC    Moisture Interventions: Absorbent underpads    Activity Interventions: Pressure redistribution bed/mattress(bed type)    Mobility Interventions: Pressure redistribution bed/mattress (bed type)    Nutrition Interventions: Document food/fluid/supplement intake    Friction and Shear Interventions: Foam dressings/transparent film/skin sealants               Problem: Impaired Skin Integrity/Pressure Injury Treatment  Goal: *Prevention of pressure injury  Document Sarwat Scale and appropriate interventions in the flowsheet.   Outcome: Progressing Towards Goal  Pressure Injury Interventions:  Sensory Interventions: Assess changes in LOC    Moisture Interventions: Absorbent underpads    Activity Interventions: Pressure redistribution bed/mattress(bed type)    Mobility Interventions: Pressure redistribution bed/mattress (bed type)    Nutrition Interventions: Document food/fluid/supplement intake    Friction and Shear Interventions: Foam dressings/transparent film/skin sealants

## 2019-03-10 NOTE — PROGRESS NOTES
Problem: Falls - Risk of  Goal: *Absence of Falls  Document [place] Fall Risk and appropriate interventions in the flowsheet. Outcome: Progressing Towards Goal  Fall Risk Interventions:       Mentation Interventions: Door open when patient unattended, Adequate sleep, hydration, pain control, Bed/chair exit alarm    Medication Interventions: Patient to call before getting OOB    Elimination Interventions: Call light in reach             Problem: Pressure Injury - Risk of  Goal: *Prevention of pressure injury  Document Sarwat Scale and appropriate interventions in the flowsheet. Outcome: Progressing Towards Goal  Pressure Injury Interventions:  Sensory Interventions: Assess changes in LOC    Moisture Interventions: Absorbent underpads    Activity Interventions: Pressure redistribution bed/mattress(bed type)    Mobility Interventions: Pressure redistribution bed/mattress (bed type)    Nutrition Interventions: Document food/fluid/supplement intake    Friction and Shear Interventions: Foam dressings/transparent film/skin sealants, Apply protective barrier, creams and emollients, Lift sheet               Problem: Impaired Skin Integrity/Pressure Injury Treatment  Goal: *Prevention of pressure injury  Document Sarwat Scale and appropriate interventions in the flowsheet.   Outcome: Progressing Towards Goal  Pressure Injury Interventions:  Sensory Interventions: Assess changes in LOC    Moisture Interventions: Absorbent underpads    Activity Interventions: Pressure redistribution bed/mattress(bed type)    Mobility Interventions: Pressure redistribution bed/mattress (bed type)    Nutrition Interventions: Document food/fluid/supplement intake    Friction and Shear Interventions: Foam dressings/transparent film/skin sealants, Apply protective barrier, creams and emollients, Lift sheet

## 2019-03-10 NOTE — ROUTINE PROCESS
Bedside and Verbal shift change report given to [id number] (oncoming nurse) by Meghna Jaffe RN   (offgoing nurse). Report included the following information SBAR, Kardex, MAR and Recent Results.

## 2019-03-10 NOTE — PROGRESS NOTES
Patient received in bed awake. Patient alert and oriented X4, denies pain and discomfort. Patient resting quietly. Frequent use items within reach. Bed locked in low position. Call bell within reach and patient verbalized understanding of use for assistance and needs. Dual skin assessment completed with Wilmer Talbert.   See previous skin man on

## 2019-03-10 NOTE — PROGRESS NOTES
1925: Assumed patient care. Received report from ANGEL tineo (offgoing nurse). Report included SBAR, Kardex, and MAR. Patient resting in bed, in no signs of pain or distress. Call light and possessions within reach. Bed in lowest setting.   - Patient turned; cleaned; linens changed   - suctioned per patient request    2130: Suctioned per patient request; moderate amount of thick clear secretions       2224: slightly elevated BP; denies headache, blurry vision; will continue to monitor     0345: Suctioned per patient request; moderate amount of thick clear secretions     0740: Bedside and Verbal shift change report given to Melodie Macias RN (oncoming nurse) by Gary Del Rio RN (offgoing nurse). Report included the following information SBAR, Kardex and MAR.

## 2019-03-11 LAB
GLUCOSE BLD STRIP.AUTO-MCNC: 114 MG/DL (ref 70–110)
GLUCOSE BLD STRIP.AUTO-MCNC: 153 MG/DL (ref 70–110)
GLUCOSE BLD STRIP.AUTO-MCNC: 81 MG/DL (ref 70–110)

## 2019-03-11 PROCEDURE — 74011636637 HC RX REV CODE- 636/637: Performed by: FAMILY MEDICINE

## 2019-03-11 PROCEDURE — 94640 AIRWAY INHALATION TREATMENT: CPT

## 2019-03-11 PROCEDURE — 65270000029 HC RM PRIVATE

## 2019-03-11 PROCEDURE — 74011250637 HC RX REV CODE- 250/637: Performed by: FAMILY MEDICINE

## 2019-03-11 PROCEDURE — 82962 GLUCOSE BLOOD TEST: CPT

## 2019-03-11 PROCEDURE — 77030037878 HC DRSG MEPILEX >48IN BORD MOLN -B

## 2019-03-11 PROCEDURE — 77010033678 HC OXYGEN DAILY

## 2019-03-11 PROCEDURE — 89220 SPUTUM SPECIMEN COLLECTION: CPT

## 2019-03-11 PROCEDURE — 77030011256 HC DRSG MEPILEX <16IN NO BORD MOLN -A

## 2019-03-11 PROCEDURE — 94668 MNPJ CHEST WALL SBSQ: CPT

## 2019-03-11 PROCEDURE — 77030018836 HC SOL IRR NACL ICUM -A

## 2019-03-11 PROCEDURE — 94760 N-INVAS EAR/PLS OXIMETRY 1: CPT

## 2019-03-11 RX ADMIN — BUPROPION HYDROCHLORIDE 150 MG: 150 TABLET, EXTENDED RELEASE ORAL at 08:13

## 2019-03-11 RX ADMIN — BACLOFEN 10 MG: 10 TABLET ORAL at 08:13

## 2019-03-11 RX ADMIN — INSULIN LISPRO 2 UNITS: 100 INJECTION, SOLUTION INTRAVENOUS; SUBCUTANEOUS at 12:07

## 2019-03-11 RX ADMIN — DULOXETINE 60 MG: 60 CAPSULE, DELAYED RELEASE ORAL at 08:13

## 2019-03-11 RX ADMIN — MULTIVITAMIN TABLET 1 TABLET: TABLET at 08:14

## 2019-03-11 RX ADMIN — LACTOBACILLUS TAB 1 TABLET: TAB at 08:13

## 2019-03-11 RX ADMIN — GABAPENTIN 600 MG: 300 CAPSULE ORAL at 22:35

## 2019-03-11 RX ADMIN — LEVETIRACETAM 500 MG: 500 TABLET, EXTENDED RELEASE ORAL at 08:14

## 2019-03-11 RX ADMIN — MIDODRINE HYDROCHLORIDE 5 MG: 2.5 TABLET ORAL at 18:16

## 2019-03-11 RX ADMIN — PANTOPRAZOLE SODIUM 20 MG: 20 TABLET, DELAYED RELEASE ORAL at 08:13

## 2019-03-11 RX ADMIN — BACLOFEN 10 MG: 10 TABLET ORAL at 18:16

## 2019-03-11 RX ADMIN — MIDODRINE HYDROCHLORIDE 5 MG: 2.5 TABLET ORAL at 08:13

## 2019-03-11 RX ADMIN — MIDODRINE HYDROCHLORIDE 5 MG: 2.5 TABLET ORAL at 12:07

## 2019-03-11 RX ADMIN — GABAPENTIN 600 MG: 300 CAPSULE ORAL at 18:16

## 2019-03-11 RX ADMIN — GABAPENTIN 600 MG: 300 CAPSULE ORAL at 08:13

## 2019-03-11 RX ADMIN — MIRTAZAPINE 7.5 MG: 15 TABLET, FILM COATED ORAL at 22:34

## 2019-03-11 NOTE — PROGRESS NOTES
Problem: Falls - Risk of  Goal: *Absence of Falls  Document [place] Fall Risk and appropriate interventions in the flowsheet. Outcome: Progressing Towards Goal  Fall Risk Interventions:       Mentation Interventions: Door open when patient unattended, HELP ([address]) if available, Toileting rounds    Medication Interventions: Patient to call before getting OOB, Teach patient to arise slowly    Elimination Interventions: Call light in reach, Toilet paper/wipes in reach, Toileting schedule/hourly rounds             Problem: Pressure Injury - Risk of  Goal: *Prevention of pressure injury  Document Sarwat Scale and appropriate interventions in the flowsheet.   Outcome: Progressing Towards Goal  Pressure Injury Interventions:  Sensory Interventions: Assess changes in LOC, Assess need for specialty bed    Moisture Interventions: Absorbent underpads, Check for incontinence Q2 hours and as needed, Maintain skin hydration (lotion/cream), Minimize layers    Activity Interventions: Assess need for specialty bed, Pressure redistribution bed/mattress(bed type), PT/OT evaluation    Mobility Interventions: Float heels, HOB 30 degrees or less, Pressure redistribution bed/mattress (bed type), PT/OT evaluation    Nutrition Interventions: Document food/fluid/supplement intake, Offer support with meals,snacks and hydration    Friction and Shear Interventions: Apply protective barrier, creams and emollients, Feet elevated on foot rest, Foam dressings/transparent film/skin sealants, HOB 30 degrees or less, Lift sheet

## 2019-03-11 NOTE — PROGRESS NOTES
Chart reviewed. Pt has been sent out to [facility]. Has been accepted to [facility], in [place], [place]. Called & spoke with Ingrid Hinkle in admissions, she was unaware pt had trach or was quadriplegic. States will have to review clinicals & get back to me. Received call from pt's [id number], states admissions from [facility] called them & asked them to call me & try [address], informed her that I have sent pt's info to [facility], I sent message via [facility] to [facility] to review & let me know if can accept. Still waiting on response. Will cont to follow. Mari CheneyRN,ext [phone].

## 2019-03-11 NOTE — PROGRESS NOTES
Bedside and verbal report given to ANGEL Kendrick, with use of kardex. Rounded on pt Q1 hour throughout shift, no s/s distress nor discomfort noted. Call bell in reach.

## 2019-03-11 NOTE — PROGRESS NOTES
Progress Note      Patient: Jose Alejandro Gonzales               Sex: male          DOA: 2/26/2019       YOB: 1980      Age:  34 y.o.        LOS:  LOS: 10 days             CHIEF COMPLAINT:  Acute on chronic respiratory failure improved    Subjective:     No complaints. Objective:      Visit Vitals  /75 (BP 1 Location: Right arm, BP Patient Position: At rest)   Pulse 79   Temp 98.8 °F (37.1 °C)   Resp 18   Ht 5' 7\" (1.702 m)   Wt 62.1 kg (136 lb 14.4 oz)   SpO2 99%   BMI 21.44 kg/m²       Physical Exam:  Gen:  No distress, no complaint  Neck:  Trach in place, functioning well  Lungs:  CTAB  Heart:  Regular rate and rhythm, no murmurs or gallops  Abdomen:  Soft, non-tender, normal bowel sounds        Lab/Data Reviewed:    Labs reviewed        Assessment/Plan     Principal Problem:    Unresponsive episode (2/27/2019)    Active Problems:    DM (diabetes mellitus) ([address] (2/23/2019)      Quadriplegia ([address] (2/23/2019)      Seizures ([address] (2/23/2019)      Hypotension (2/27/2019)      Moderate malnutrition ([address] (6/2/2332)      Metabolic encephalopathy (1/7/7822)      Septic shock ([address] (3/7/2019)      Overview: Present on admission, improved      Acute on chronic respiratory failure with hypoxia and hypercapnia (HCC) (3/7/2019)      Overview: Present on admission, improved        Plan:  Awaiting dispo plan  Has been seen by pulm and treated for aspiration pna , URTI, mucous plugging.

## 2019-03-11 NOTE — PROGRESS NOTES
2866 Patient requesting to hold cough assist at this time. Patient stated he just ate lunch. Assess and asked patient if he needed suctioning and he said no suction needed at this time. Patient said he would call if he needed anything. Will check on patient at a later time.

## 2019-03-11 NOTE — PROGRESS NOTES
Recd pt on Trach collar:  28% Fio2, 6 lpm flow  Change water bottle, change inner cannula  --Pt has spare trachs size 6 at bedside with BVM  --Pt currently using size 6 trach portex, cuff deflated  --pt reports no resp distress, initial sats 99%    -administer cough  Assist, suction  --Pt tolerated well, pt confirmed breathing well following treatment, sats 99%, cuff deflated & pt talking with me when left bedside    1635- Pt remains on trach collar - water bottle 3/4 full  --pt reports no distress  --administer cough assist & suction  --clean trach, change drain bandage  --sats 99% post treatment - tolerated well

## 2019-03-12 LAB
GLUCOSE BLD STRIP.AUTO-MCNC: 101 MG/DL (ref 70–110)
GLUCOSE BLD STRIP.AUTO-MCNC: 114 MG/DL (ref 70–110)
GLUCOSE BLD STRIP.AUTO-MCNC: 133 MG/DL (ref 70–110)
GLUCOSE BLD STRIP.AUTO-MCNC: 148 MG/DL (ref 70–110)
GLUCOSE BLD STRIP.AUTO-MCNC: 155 MG/DL (ref 70–110)

## 2019-03-12 PROCEDURE — 77010033678 HC OXYGEN DAILY

## 2019-03-12 PROCEDURE — 74011636637 HC RX REV CODE- 636/637: Performed by: FAMILY MEDICINE

## 2019-03-12 PROCEDURE — 65270000029 HC RM PRIVATE

## 2019-03-12 PROCEDURE — 94760 N-INVAS EAR/PLS OXIMETRY 1: CPT

## 2019-03-12 PROCEDURE — 74011250637 HC RX REV CODE- 250/637: Performed by: FAMILY MEDICINE

## 2019-03-12 PROCEDURE — 89220 SPUTUM SPECIMEN COLLECTION: CPT

## 2019-03-12 PROCEDURE — 77030038269 HC DRN EXT URIN PURWCK BARD -A

## 2019-03-12 PROCEDURE — 94668 MNPJ CHEST WALL SBSQ: CPT

## 2019-03-12 PROCEDURE — 94761 N-INVAS EAR/PLS OXIMETRY MLT: CPT

## 2019-03-12 PROCEDURE — 77030037878 HC DRSG MEPILEX >48IN BORD MOLN -B

## 2019-03-12 RX ADMIN — GABAPENTIN 600 MG: 300 CAPSULE ORAL at 22:18

## 2019-03-12 RX ADMIN — LEVETIRACETAM 500 MG: 500 TABLET, EXTENDED RELEASE ORAL at 08:46

## 2019-03-12 RX ADMIN — BACLOFEN 10 MG: 10 TABLET ORAL at 08:46

## 2019-03-12 RX ADMIN — GABAPENTIN 600 MG: 300 CAPSULE ORAL at 08:46

## 2019-03-12 RX ADMIN — MIDODRINE HYDROCHLORIDE 5 MG: 2.5 TABLET ORAL at 08:46

## 2019-03-12 RX ADMIN — GABAPENTIN 600 MG: 300 CAPSULE ORAL at 18:02

## 2019-03-12 RX ADMIN — MULTIVITAMIN TABLET 1 TABLET: TABLET at 08:46

## 2019-03-12 RX ADMIN — BACLOFEN 10 MG: 10 TABLET ORAL at 18:02

## 2019-03-12 RX ADMIN — BUPROPION HYDROCHLORIDE 150 MG: 150 TABLET, EXTENDED RELEASE ORAL at 08:47

## 2019-03-12 RX ADMIN — LACTOBACILLUS TAB 1 TABLET: TAB at 08:47

## 2019-03-12 RX ADMIN — MIDODRINE HYDROCHLORIDE 5 MG: 2.5 TABLET ORAL at 12:46

## 2019-03-12 RX ADMIN — MIRTAZAPINE 7.5 MG: 15 TABLET, FILM COATED ORAL at 22:18

## 2019-03-12 RX ADMIN — MIDODRINE HYDROCHLORIDE 5 MG: 2.5 TABLET ORAL at 18:02

## 2019-03-12 RX ADMIN — PANTOPRAZOLE SODIUM 20 MG: 20 TABLET, DELAYED RELEASE ORAL at 08:46

## 2019-03-12 RX ADMIN — DULOXETINE 60 MG: 60 CAPSULE, DELAYED RELEASE ORAL at 08:46

## 2019-03-12 RX ADMIN — INSULIN LISPRO 2 UNITS: 100 INJECTION, SOLUTION INTRAVENOUS; SUBCUTANEOUS at 12:46

## 2019-03-12 NOTE — PROGRESS NOTES
Problem: Falls - Risk of  Goal: *Absence of Falls  Document [place] Fall Risk and appropriate interventions in the flowsheet. Outcome: Progressing Towards Goal  Fall Risk Interventions:       Mentation Interventions: Door open when patient unattended    Medication Interventions: Patient to call before getting OOB    Elimination Interventions: Call light in reach             Problem: Pressure Injury - Risk of  Goal: *Prevention of pressure injury  Document Sarwat Scale and appropriate interventions in the flowsheet. Outcome: Progressing Towards Goal  Pressure Injury Interventions:  Sensory Interventions: Pressure redistribution bed/mattress (bed type)    Moisture Interventions: Absorbent underpads, Check for incontinence Q2 hours and as needed    Activity Interventions: Pressure redistribution bed/mattress(bed type)    Mobility Interventions: Suspension boots, Pressure redistribution bed/mattress (bed type)    Nutrition Interventions: Document food/fluid/supplement intake, Offer support with meals,snacks and hydration    Friction and Shear Interventions: Lift sheet, Apply protective barrier, creams and emollients, Foam dressings/transparent film/skin sealants               Problem: Impaired Skin Integrity/Pressure Injury Treatment  Goal: *Prevention of pressure injury  Document Sarwat Scale and appropriate interventions in the flowsheet.   Outcome: Progressing Towards Goal  Pressure Injury Interventions:  Sensory Interventions: Pressure redistribution bed/mattress (bed type)    Moisture Interventions: Absorbent underpads, Check for incontinence Q2 hours and as needed    Activity Interventions: Pressure redistribution bed/mattress(bed type)    Mobility Interventions: Suspension boots, Pressure redistribution bed/mattress (bed type)    Nutrition Interventions: Document food/fluid/supplement intake, Offer support with meals,snacks and hydration    Friction and Shear Interventions: Lift sheet, Apply protective barrier, creams and emollients, Foam dressings/transparent film/skin sealants

## 2019-03-12 NOTE — PROGRESS NOTES
0715 - Bedside shift report from Galina Aguilar RN.     0800 - Stable pt assessment. Hygiene and wound care performed, pt repositioned. Tolerated all of his breakfast.     0845 - PIV started. 1000 - No changes, pt resting in bed, repositioned. 1200 - Stable re-assessment, no changes. Pt waiting for lunch and watching tv.     1400 - Pt was incontinent of urine. Hygiene care performed, pt repositioned and resting in bed.     1427 - Bedside report to Gricel Garcia RN.

## 2019-03-12 NOTE — PROGRESS NOTES
Spoke with Wes Rivera in admissions @ [facility], states Ele, head of resp came over this morning to assess & meet with pt, she needs to speak with resp therapist from here. Spoke with Milka Lawton in resp & provided him with Ele's number, [phone], states he will call her. Spoke with Milka Lawton later, he states he did speak with Ele @ [facility]. Sent message to [facility] to see if will accept.   Mari Cheney,RN,ext [phone]

## 2019-03-12 NOTE — PROGRESS NOTES
Assumed care of patient from Claudia Sherita, [facility]. Pt in bed awake resting in no distress. Pt on tracheostomy mask, denies pain . Incontinence care done pt made comfortable. Assessment completed, ileostomy bag emptied by the off going nurse. HOB elevated bed low and in locked position call light within reach. Will continue with the care. 2240- Pt requested to be suctioned. Suction done small amount of secretions obtained. Pt stated \" I am comfortable. \"  0000- Pt given a bed bath, hair shampooed, ileostomy care done pt made comfortable in bed.  0600- uneventful night. Pt resting in bed watching tv  0810- Bedside and Verbal shift change report given to Convrrt  RN (oncoming nurse) by Tiffanie Lentz RN (offgoing nurse). Report included the following information SBAR, Kardex, Intake/Output, MAR and Recent Results.

## 2019-03-12 NOTE — PROGRESS NOTES
Patient received cough assist +[id number]/-[id number]. 10cycles of 5 breaths with suction until breath sounds cleared. Tolerated well. Suctioned for large amounts of clear/white thick secretions. SPO2 97% on 28% trach collar.

## 2019-03-12 NOTE — ROUTINE PROCESS
Bedside and Verbal shift change report given to [employer] (oncoming nurse) by Enrique Bird RN (offgoing nurse). Report included the following information SBAR, Kardex, Intake/Output and MAR.

## 2019-03-12 NOTE — ROUTINE PROCESS
Assume care of patient from [facility]. Patient received in bed awake. Patient A&Ox4, denies pain and discomfort. No distress noted. Wound care completed on sacrum. Ileostomy emptied. Incontinent care completed. Prevolon boots present bilateral. SCD's in place bilateral. Was informed that patient will be transferred to SNF tomorrow. Frequently use items within reach. Bed locked in low position. Call bell within reach and patient verbalized understanding of use for assistance and needs.

## 2019-03-12 NOTE — PROGRESS NOTES
Pt awake on 28% Trach collar with no respiratory distress at this time. Suctioned with cough assist 5 cycles x 5 breaths 40 cmH2O pt tolerated well. Moderate amount of white/clear secretions. Ricardo Olden is secure patent and midline. Inner canula changed. Emergency equipment is at the bedside. Suctioned with cough assist 5 cycles X 5 breaths 40 cmH2O pt tolerated. Moderate amount of white/clear secretions suctioned. Ricardo Olden remains secure/patent/midline. Gauze changed stoma no infection. Back up emergency equipment is at the bedside.

## 2019-03-12 NOTE — PROGRESS NOTES
Progress Note      Patient: Dom Mcghee               Sex: male          DOA: 2/26/2019       YOB: 1980      Age:  34 y.o.        LOS:  LOS: 11 days             CHIEF COMPLAINT:  Acute on chronic respiratory failure improved    Subjective:     No complaints. Awaiting placement. Objective:      Visit Vitals  /80   Pulse 74   Temp 98.4 °F (36.9 °C)   Resp 15   Ht 5' 7\" (1.702 m)   Wt 62.1 kg (136 lb 14.4 oz)   SpO2 94%   BMI 21.44 kg/m²       Physical Exam:  Gen:  No distress, no complaint  Neck:  Trach in place, functioning well  Lungs:  CTAB  Heart:  Regular rate and rhythm, no murmurs or gallops  Abdomen:  Soft, non-tender, normal bowel sounds        Lab/Data Reviewed:    Labs reviewed        Assessment/Plan     Principal Problem:    Unresponsive episode (2/27/2019)    Active Problems:    DM (diabetes mellitus) ([address] (2/23/2019)      Quadriplegia ([address] (2/23/2019)      Seizures ([address] (2/23/2019)      Hypotension (2/27/2019)      Moderate malnutrition ([address] (0/0/9129)      Metabolic encephalopathy (1/6/0668)      Septic shock ([address] (3/7/2019)      Overview: Present on admission, improved      Acute on chronic respiratory failure with hypoxia and hypercapnia (HCC) (3/7/2019)      Overview: Present on admission, improved        Plan:  Awaiting dispo plan  Has been seen by pulm and treated for aspiration pna , URTI, mucous plugging.

## 2019-03-12 NOTE — PROGRESS NOTES
Pt has been accepted to [facility] for tomorrow, 3/13/19. Met with pt & made him aware. Spoke with Family & made them aware. All agreeable to transfer. Mari Cheney RN,ext [phone].

## 2019-03-12 NOTE — PROGRESS NOTES
Problem: Falls - Risk of  Goal: *Absence of Falls  Document [place] Fall Risk and appropriate interventions in the flowsheet.   Outcome: Progressing Towards Goal  Fall Risk Interventions:       Mentation Interventions: Door open when patient unattended    Medication Interventions: Patient to call before getting OOB    Elimination Interventions: Call light in reach

## 2019-03-12 NOTE — PROGRESS NOTES
Problem: Falls - Risk of  Goal: *Absence of Falls  Document [place] Fall Risk and appropriate interventions in the flowsheet. Outcome: Progressing Towards Goal  Fall Risk Interventions:       Mentation Interventions: Door open when patient unattended    Medication Interventions: Bed/chair exit alarm    Elimination Interventions: Call light in reach, Toileting schedule/hourly rounds             Problem: Pressure Injury - Risk of  Goal: *Prevention of pressure injury  Document [place] Scale and appropriate interventions in the flowsheet. Outcome: Progressing Towards Goal  Pressure Injury Interventions:  Sensory Interventions: Pressure redistribution bed/mattress (bed type)    Moisture Interventions: Absorbent underpads    Activity Interventions: Pressure redistribution bed/mattress(bed type)    Mobility Interventions: HOB 30 degrees or less, Pressure redistribution bed/mattress (bed type), Suspension boots, Float heels, Turn and reposition approx. every two hours(pillow and wedges)    Nutrition Interventions: Document food/fluid/supplement intake    Friction and Shear Interventions: Lift sheet, Foam dressings/transparent film/skin sealants               Problem: Impaired Skin Integrity/Pressure Injury Treatment  Goal: *Prevention of pressure injury  Document Sarwat Scale and appropriate interventions in the flowsheet. Outcome: Progressing Towards Goal  Pressure Injury Interventions:  Sensory Interventions: Pressure redistribution bed/mattress (bed type)    Moisture Interventions: Absorbent underpads    Activity Interventions: Pressure redistribution bed/mattress(bed type)    Mobility Interventions: HOB 30 degrees or less, Pressure redistribution bed/mattress (bed type), Suspension boots, Float heels, Turn and reposition approx.  every two hours(pillow and wedges)    Nutrition Interventions: Document food/fluid/supplement intake    Friction and Shear Interventions: Lift sheet, Foam dressings/transparent film/skin sealants

## 2019-03-12 NOTE — PROGRESS NOTES
Harmeet tolerated his cough assist X2 overnight. Settings: +[id number]/-[id number] with suction until breath sounds cleared.

## 2019-03-12 NOTE — PROGRESS NOTES
conducted a Follow up consultation and Spiritual Assessment for Kerry Alberto, who is a 34 y.o.,male. The  provided the following Interventions:  Continued the relationship of care and support. Listened empathically. Offered prayer and assurance of continued prayer on patients behalf. Chart reviewed. The following outcomes were achieved:  Patient expressed gratitude for 's visit. Assessment:  There are no spiritual or Scientologist issues which require intervention at this time. Plan:  Chaplains will continue to follow and will provide pastoral care on an as needed/requested basis.  recommends bedside caregivers page  on duty if patient shows signs of acute spiritual or emotional distress. Claud Osgood, M.Div.   , [facility]: [phone]/NLJ: [phone]

## 2019-03-13 VITALS
DIASTOLIC BLOOD PRESSURE: 97 MMHG | OXYGEN SATURATION: 98 % | WEIGHT: 132.1 LBS | HEIGHT: 67 IN | SYSTOLIC BLOOD PRESSURE: 151 MMHG | BODY MASS INDEX: 20.73 KG/M2 | RESPIRATION RATE: 16 BRPM | HEART RATE: 98 BPM | TEMPERATURE: 98.2 F

## 2019-03-13 LAB
GLUCOSE BLD STRIP.AUTO-MCNC: 113 MG/DL (ref 70–110)
GLUCOSE BLD STRIP.AUTO-MCNC: 152 MG/DL (ref 70–110)

## 2019-03-13 PROCEDURE — 94761 N-INVAS EAR/PLS OXIMETRY MLT: CPT

## 2019-03-13 PROCEDURE — 74011636637 HC RX REV CODE- 636/637: Performed by: FAMILY MEDICINE

## 2019-03-13 PROCEDURE — 77030037878 HC DRSG MEPILEX >48IN BORD MOLN -B

## 2019-03-13 PROCEDURE — 94668 MNPJ CHEST WALL SBSQ: CPT

## 2019-03-13 PROCEDURE — 77030018836 HC SOL IRR NACL ICUM -A

## 2019-03-13 PROCEDURE — 89220 SPUTUM SPECIMEN COLLECTION: CPT

## 2019-03-13 PROCEDURE — 94760 N-INVAS EAR/PLS OXIMETRY 1: CPT

## 2019-03-13 PROCEDURE — 77010033678 HC OXYGEN DAILY

## 2019-03-13 PROCEDURE — 82962 GLUCOSE BLOOD TEST: CPT

## 2019-03-13 PROCEDURE — 74011250637 HC RX REV CODE- 250/637: Performed by: FAMILY MEDICINE

## 2019-03-13 RX ADMIN — GABAPENTIN 600 MG: 300 CAPSULE ORAL at 09:42

## 2019-03-13 RX ADMIN — DULOXETINE 60 MG: 60 CAPSULE, DELAYED RELEASE ORAL at 09:41

## 2019-03-13 RX ADMIN — CYCLOBENZAPRINE HYDROCHLORIDE 10 MG: 10 TABLET, FILM COATED ORAL at 08:53

## 2019-03-13 RX ADMIN — INSULIN LISPRO 2 UNITS: 100 INJECTION, SOLUTION INTRAVENOUS; SUBCUTANEOUS at 12:13

## 2019-03-13 RX ADMIN — LACTOBACILLUS TAB 1 TABLET: TAB at 09:43

## 2019-03-13 RX ADMIN — LEVETIRACETAM 500 MG: 500 TABLET, EXTENDED RELEASE ORAL at 09:42

## 2019-03-13 RX ADMIN — BUPROPION HYDROCHLORIDE 150 MG: 150 TABLET, EXTENDED RELEASE ORAL at 09:42

## 2019-03-13 RX ADMIN — PANTOPRAZOLE SODIUM 20 MG: 20 TABLET, DELAYED RELEASE ORAL at 09:42

## 2019-03-13 RX ADMIN — MULTIVITAMIN TABLET 1 TABLET: TABLET at 09:43

## 2019-03-13 RX ADMIN — BACLOFEN 10 MG: 10 TABLET ORAL at 09:43

## 2019-03-13 RX ADMIN — MIDODRINE HYDROCHLORIDE 5 MG: 2.5 TABLET ORAL at 08:53

## 2019-03-13 NOTE — PROGRESS NOTES
955 Patient resting and refused cough assist at this time.  Patient stated he will call if he needs it before his next scheduled time for cough assist.

## 2019-03-13 NOTE — ROUTINE PROCESS
Bedside and Verbal shift change report given to Ami Reynaga RN (oncoming nurse) by Ibeth Elam RN (offgoing nurse). Report included the following information SBAR, Kardex, Intake/Output, MAR and Recent Results.

## 2019-03-13 NOTE — DISCHARGE SUMMARY
Discharge Summary    Patient: Todd Goodson               Sex: male          DOA: 2/26/2019       YOB: 1980      Age:  34 y.o.        LOS:  LOS: 12 days                Admit Date: 2/26/2019    Discharge Date: 3/13/2019    Admission Diagnoses: Unresponsive episode [R41.89]  Hypotension [I95.9]  Unresponsive episode [R41.89]  Hypotension [I95.9]  Unresponsiveness [R41.89]    Discharge Diagnoses:    Problem List as of 3/13/2019 Never Reviewed          Codes Class Noted - Resolved    Septic shock ([facility] ICD-10-CM: A41.9, R65.21  ICD-9-CM: 038.9, 785.52, 995.92  3/7/2019 - Present    Overview Signed 3/7/2019  8:59 AM by Richi Elkins MD     Present on admission, improved             Acute on chronic respiratory failure with hypoxia and hypercapnia ([facility] ICD-10-CM: J96.21, J96.22  ICD-9-CM: 518.84, 786.09, 799.02  3/7/2019 - Present    Overview Signed 3/7/2019  9:00 AM by Richi Elkins MD     Present on admission, improved             Moderate malnutrition ([facility] ICD-10-CM: E44.0  ICD-9-CM: 263.0  3/1/2019 - Present        Metabolic encephalopathy TAN-82-UT: G93.41  ICD-9-CM: 348.31  3/1/2019 - Present        Unresponsiveness ICD-10-CM: R41.89  ICD-9-CM: 780.09  3/1/2019 - Present        Hypotension ICD-10-CM: I95.9  ICD-9-CM: 458.9  2/27/2019 - Present        * (Principal) Unresponsive episode ICD-10-CM: R41.89  ICD-9-CM: 780.09  2/27/2019 - Present        Sepsis ([facility] ICD-10-CM: A41.9  ICD-9-CM: 038.9, 995.91  2/23/2019 - Present        UTI (urinary tract infection) ICD-10-CM: N39.0  ICD-9-CM: 599.0  2/23/2019 - Present        DM (diabetes mellitus) ([facility] ICD-10-CM: E11.9  ICD-9-CM: 250.00  2/23/2019 - Present        Acute on chronic respiratory failure with hypoxia (HCC) ICD-10-CM: J96.21  ICD-9-CM: 518.84, 799.02  2/23/2019 - Present        GERD (gastroesophageal reflux disease) ICD-10-CM: K21.9  ICD-9-CM: 530.81  2/23/2019 - Present        Quadriplegia ([facility] ICD-10-CM: G82.50  ICD-9-CM: 344.00 2/23/2019 - Present        Seizures ([facility] ICD-10-CM: R56.9  ICD-9-CM: 780.39  2/23/2019 - Present              Discharge Condition:  Improved    Hospital Course:  34year-old quadriplegic with tracheostomy presented to the emergency room on 2/26/19 secondary to unresponsiveness. Pulmonary/CCM was consulted. Chest CT showed bilateral lower lobe infiltrates left greater than right with a subtle right upper lobe infiltrate. Sputum collected on 2/23/19 grew Stenotrophomonas and Providencia. The patient's antibiotics were changed from Levaquin to Bactrim. Vancomycin was discontinued, and Zosyn was continued. He was treated with 7 days of Bactrim and Zosyn. He was treated with nebulizers and CoughAssist device. He improved significantly and returned back to baseline. He was approved to go to [facility] for further care. Consults:    Pulmonary/Critical Care    Labs:  Labs: Results:       Chemistry No results for input(s): GLU, NA, K, CL, CO2, BUN, CREA, CA, AGAP, BUCR, TBIL, GPT, AP, TP, ALB, GLOB, AGRAT in the last 72 hours. CBC w/Diff No results for input(s): WBC, RBC, HGB, HCT, PLT, GRANS, LYMPH, EOS, HGBEXT, HCTEXT, PLTEXT in the last 72 hours. Cardiac Enzymes No results for input(s): CPK, CKND1, TWIN in the last 72 hours. No lab exists for component: CKRMB, TROIP   Coagulation No results for input(s): PTP, INR, APTT in the last 72 hours. No lab exists for component: INREXT    Lipid Panel No results found for: CHOL, CHOLPOCT, CHOLX, CHLST, CHOLV, 792162, HDL, LDL, LDLC, DLDLP, 815681, VLDLC, VLDL, TGLX, TRIGL, TRIGP, TGLPOCT, CHHD, CHHDX   BNP No results for input(s): BNPP in the last 72 hours. Liver Enzymes No results for input(s): TP, ALB, TBIL, AP, SGOT, GPT in the last 72 hours.     No lab exists for component: DBIL   Thyroid Studies No results found for: T4, T3U, TSH, TSHEXT         Significant Diagnostic Studies:   Cta Chest W Or W Wo Cont    Result Date: 2/27/2019  EXAM: CTA Chest INDICATION: Unresponsive patient with hypoxemia. COMPARISON: CT angiogram of the chest performed 2/23/2019 TECHNIQUE: Axial CT imaging from the thoracic inlet through the diaphragm with intravenous contrast utilizing CTA study for pulmonary artery evaluation. Coronal and sagittal MIP reformations were generated at a separate workstation. One or more dose reduction techniques were used on this CT: automated exposure control, adjustment of the mAs and/or kVp according to patient size, and iterative reconstruction techniques. The specific techniques used on this CT exam have been documented in the patient's electronic medical record. Digital Imaging and Communications in Medicine (DICOM) format image data are available to nonaffiliated external healthcare facilities or entities on a secure, media free, reciprocally searchable basis with patient authorization for at least a 12-month period after this study. _______________ FINDINGS: EXAM QUALITY: Overall exam quality is adequate. Pulmonary arterial enhancement is adequate. The breath hold is satisfactory. PULMONARY ARTERIES: No convincing evidence of pulmonary embolism. MEDIASTINUM: Included portions of the thyroid gland demonstrate no focal abnormality. Thoracic aorta is normal in course and caliber. Mild cardiac enlargement present without pericardial effusion. LYMPH NODES: No enlarged mediastinal or hilar nodes by size criteria. AIRWAY: Tracheostomy catheter in stable position. Airway secretions noted dependently within the trachea, as well as the right upper lobe and bilateral lower lobe segmental bronchi. LUNGS: Progressive groundglass density present within the right upper lobe noted with patchy peribronchial opacities throughout the right lower lobe with unchanged consolidation of the medial aspect left lower lobe. PLEURA: Trace left pleural effusion. No right pleural effusion. No pneumothorax. UPPER ABDOMEN: Visualized upper abdomen is unremarkable. Thien Lopez OTHER: No acute or aggressive osseous abnormalities identified. _______________     IMPRESSION: 1. No evidence of pulmonary embolism. 2.  Redemonstration of left lower lobe pneumonia with progressive peribronchial groundglass density noted in the right upper lobe and right lower lobe. Multiple foci of endobronchial secretions as above. Trace left pleural effusion. 3. Tracheostomy catheter in stable position. Note: Preliminary report sent to the Emergency Department by the radiology resident at the time of the study. Cta Chest W Or W Wo Cont    Result Date: 2/23/2019  EXAM: CTA Chest INDICATION: Shortness of breath, cough, evaluation for pulmonary embolism. Sepsis. COMPARISON: No prior study. TECHNIQUE: Axial CT imaging from the thoracic inlet through the diaphragm with intravenous contrast utilizing CTA study for pulmonary artery evaluation. Coronal and sagittal MIP reformations were generated at a separate workstation. One or more dose reduction techniques were used on this CT: automated exposure control, adjustment of the mAs and/or kVp according to patient size, and iterative reconstruction techniques. The specific techniques used on this CT exam have been documented in the patient's electronic medical record. Digital Imaging and Communications in Medicine (DICOM) format image data are available to nonaffiliated external healthcare facilities or entities on a secure, media free, reciprocally searchable basis with patient authorization for at least a 12-month period after this study. _______________ FINDINGS: EXAM QUALITY: Overall exam quality is adequate. Pulmonary arterial enhancement is adequate. The breath hold is satisfactory. PULMONARY ARTERIES: No convincing evidence of pulmonary embolism. MEDIASTINUM: Included portions of the thyroid gland demonstrate no focal abnormality. Cardiac size is normal. Thoracic aorta normal in course and caliber. No pericardial effusion.  LYMPH NODES: No enlarged mediastinal or hilar nodes by size criteria. AIRWAY: Tracheostomy catheter present within the trachea. Diffuse bronchial wall thickening noted with endobronchial secretions noted throughout the left mainstem bronchus and left lower lobe bronchi. LUNGS: Patchy peribronchial opacities noted throughout the left lower lobe with volume loss demonstrated. Additional linear areas of opacity noted at the lung bases bilaterally. No suspicious pulmonary nodule or mass. Accessory fissure for the right lower lobe noted. PLEURA: No pleural effusion or pneumothorax. UPPER ABDOMEN: Visualized upper abdomen is unremarkable. . OTHER: No acute or aggressive osseous abnormalities identified. _______________     IMPRESSION: 1. No evidence of pulmonary embolism. 2.  Tracheostomy catheter in expected position. 3. Considerable bronchial wall thickening and endobronchial secretions as above. Patchy peribronchial opacities throughout the left lower lobe may reflect a superimposed element of pneumonia, with overall volume loss in the left lower lobe suggesting some associated atelectasis. No pleural effusion. Note: Preliminary report sent to the Emergency Department by the radiology resident at the time of the study. Xr Chest Port    Result Date: 3/1/2019  Chest, single view Indication: Respiratory distress Comparison: Several prior exams, most recently 2/28/2019 Findings:  Portable upright AP view of the chest was obtained. Tracheostomy catheter present with tip projecting just over the thoracic inlet. Persistent medial left retrocardiac density and right sided retrocardiac densities without significant change in appearance. Faint opacity over the right upper lobe unchanged. No pneumothorax or pleural effusion. Stable cardiac size and mediastinal contours. No acute osseous abnormality.      Impression: Combination of atelectasis and airspace disease at the lung bases and right upper lobe without significant change in appearance from prior days exam.    Xr Chest Port    Result Date: 2/28/2019  Chest, single view Indication: Respiratory distress Comparison: Several prior exams, most recently 2/27/2019 Findings:  Portable upright AP view of the chest was obtained on 2 exposures. Tracheostomy catheter projects over the thoracic inlet. Lungs are mildly underexpanded by comparison to prior days examination. Left retrocardiac opacity without significant change from prior. Additional faint right upper lobe airspace disease demonstrated. No pneumothorax with small left pleural effusion. Cardiac size and mediastinal contours are stable. No acute osseous abnormality. IMPRESSION: 1. Tracheostomy catheter in stable position. 2. Left retrocardiac and right upper lobe airspace disease along with small left pleural effusion overall similar to preceding CT scan of the chest.    Xr Chest Port    Result Date: 2/27/2019  EXAM: CHEST RADIOGRAPH, SINGLE VIEW CLINICAL INDICATION/HISTORY: respiratory distress   >ADDITIONAL HISTORY: None. COMPARISON: 2/26/2019 TECHNIQUE: Portable frontal view of the chest was obtained. _______________ FINDINGS: SUPPORT DEVICES: Tracheostomy is again noted in the trachea. HEART AND MEDIASTINUM: Cardiomediastinal silhouette appears within normal limits. LUNGS AND PLEURAL SPACES: Pulmonary vessels are normal. Consolidation is again noted in the medial left lung base increasing in density with a straight lateral demarcation. Mild streaky density is present lateral left lung base without change in the may be some early streaky infiltrate developing right suprahilar and medial right basilar regions. Both costophrenic angles are minimally blunted. BONY THORAX AND SOFT TISSUES: No acute osseous abnormality. _______________     IMPRESSION: 1. Interval progression of left basilar atelectasis and probable pneumonia. 2. Developing right suprahilar and medial right basilar streaky opacities, suggesting additional pneumonia and/or atelectasis.  3. Minimal bilateral pleural effusions versus pleural thickening. Xr Chest Port    Result Date: 2/27/2019  EXAM: CHEST RADIOGRAPH, SINGLE VIEW CLINICAL INDICATION/HISTORY: meets SIRS criteria   >ADDITIONAL HISTORY: Altered mental status. COMPARISON: 2/23/2019 TECHNIQUE: Portable frontal view of the chest was obtained. _______________ FINDINGS: SUPPORT DEVICES: Tracheostomy remains in the trachea unchanged. HEART AND MEDIASTINUM: Cardiac silhouette is within normal range in size. Small amount of calcified plaque is present at the aortic arch. LUNGS AND PLEURAL SPACES: Pulmonary vessels are normal. Minimal streaking is present at the mid to lateral left lung base without significant changes. No pneumothorax or pleural effusion. BONY THORAX AND SOFT TISSUES: Mild degenerative changes again noted at both shoulders. _______________     IMPRESSION: Minimal streaking left lung base without significant change, atelectasis or scarring, streaky infiltrate not completely excluded. Xr Chest Port    Result Date: 2/23/2019  Chest, single view Indication: Shortness of breath and sepsis Comparison: 2/15/2019 Findings:  Portable upright AP view of the chest was obtained. Tracheostomy catheter projects in stable position. Mild interval increase in left retrocardiac opacity. No pneumothorax or pleural effusion. Stable cardiac size and mediastinal contours. No acute osseous abnormality. Impression: 1. Tracheostomy catheter in stable position. 2. Mild interval increase in left retrocardiac density potential atelectasis or airspace disease. Xr Chest Port    Result Date: 2/15/2019  Chest, single view Indication: Sepsis Comparison: 2/14/2019 Findings:  Portable upright AP view of the chest was obtained. There is a tracheostomy catheter which projects over the thoracic inlet in stable position. Faint linear opacity at the left lung base unchanged. No focal pneumonic consolidation, pneumothorax, or pleural effusion.  Cardiac size and mediastinal contours remain within normal limits. No acute osseous abnormality. Impression: 1. Tracheostomy catheter in stable position. 2. Faint linear area of atelectasis or scarring at the left lung base, unchanged without superimposed acute radiographic abnormality. Xr Chest Port    Result Date: 2/15/2019  EXAM: Portable frontal view of the chest. CLINICAL INDICATION/HISTORY: Difficulty suctioning tracheostomy, shortness of breath COMPARISON: _______________ FINDINGS: Tracheostomy tube is in satisfactory position. Normal mediastinal and cardiac silhouettes. Mild streaky density medial and lateral left lung base. Remainder of lungs clear. No pleural effusion. No significant bony finding. _______________     IMPRESSION: 1. Mild areas of atelectasis or scarring left base. 2. No other evidence of active cardiopulmonary disease. Discharge Medications:     Current Discharge Medication List      CONTINUE these medications which have CHANGED    Details   !! fentaNYL (DURAGESIC) 25 mcg/hr PATCH 1 Patch by TransDERmal route every seventy-two (72) hours for 30 days. Max Daily Amount: 1 Patch. Qty: 3 Patch, Refills: 0    Associated Diagnoses: Quadriplegia ([facility]       ! ! - Potential duplicate medications found. Please discuss with provider. CONTINUE these medications which have NOT CHANGED    Details   multivitamin (DAILY MULTIPLE) tablet Take 1 Tab by mouth daily. !! collagenase (SANTYL) 250 unit/gram ointment Apply  to affected area daily. baclofen (LIORESAL) 20 mg tablet       buPROPion SR (WELLBUTRIN SR) 150 mg SR tablet       DULoxetine (CYMBALTA) 60 mg capsule       !! fentaNYL (DURAGESIC) 25 mcg/hr PATCH       gabapentin (NEURONTIN) 600 mg tablet       glipiZIDE SR (GLUCOTROL XL) 10 mg CR tablet       cyclobenzaprine (FLEXERIL) 10 mg tablet Take  by mouth three (3) times daily as needed for Muscle Spasm(s). levETIRAcetam (KEPPRA XR) 500 mg ER tablet Take 500 mg by mouth daily. metFORMIN (GLUCOPHAGE) 500 mg tablet Take  by mouth two (2) times daily (with meals). omeprazole (PRILOSEC) 20 mg capsule Take 20 mg by mouth daily. amino acids/protein hydrolys (PRO-STAT SUGAR FREE PO) Take  by mouth. !! collagenase (SANTYL) 250 unit/gram ointment Apply  to affected area daily. acetaminophen (TYLENOL) 325 mg tablet Take 650 mg by mouth every four (4) hours as needed for Pain. lactobacillus acidoph-pectin (ACIDOPHILUS-PECTIN) 75 million cell -100 mg cap capsule Take 1 Cap by mouth daily. mirtazapine (REMERON) 7.5 mg tablet Take 7.5 mg by mouth nightly. !! - Potential duplicate medications found. Please discuss with provider. STOP taking these medications       levoFLOXacin (LEVAQUIN) 750 mg tablet Comments:   Reason for Stopping:               Activity: Activity as tolerated    Diet: Per SLP -  Pt safe for regular/thin diet with PMV. Follow-up: PCP in 1 week.          Total time spent including time spent on final examination and discharge discussion, discharge documentation and records reviewed and medication reconciliation: > 30 minutes    Iraida Mcintosh MD  [facility]

## 2019-03-13 NOTE — DISCHARGE INSTRUCTIONS
DISCHARGE SUMMARY from Nurse    PATIENT INSTRUCTIONS:        What to do at [address] [place] [address]: Activity: Activity as tolerated     Diet: Per SLP -  Pt safe for regular/thin diet with PMV.     Follow-up: PCP in 1 week. * Recommended activity: see above    * If you experience any of the following symptoms which led to your hospital admission, please follow up with your Primary Care Physician and/ or call 911. *  Please give a list of your current medications to your Primary Care Provider. *  Please update this list whenever your medications are discontinued, doses are      changed, or new medications (including over-the-counter products) are added. *  Please carry medication information at all times in case of emergency situations. These are general instructions for a healthy lifestyle:    No smoking/ No tobacco products/ Avoid exposure to second hand smoke  Surgeon General's Warning:  Quitting smoking now greatly reduces serious risk to your health. Obesity, smoking, and sedentary lifestyle greatly increases your risk for illness    A healthy diet, regular physical exercise & weight monitoring are important for maintaining a healthy lifestyle    You may be retaining fluid if you have a history of heart failure or if you experience any of the following symptoms:  Weight gain of 3 pounds or more overnight or 5 pounds in a week, increased swelling in our hands or feet or shortness of breath while lying flat in bed. Please call your doctor as soon as you notice any of these symptoms; do not wait until your next office visit. Recognize signs and symptoms of STROKE:    F-face looks uneven    A-arms unable to move or move unevenly    S-speech slurred or non-existent    T-time-call 911 as soon as signs and symptoms begin-DO NOT go       Back to bed or wait to see if you get better-TIME IS BRAIN. Warning Signs of HEART ATTACK     Call 911 if you have these symptoms:   Chest discomfort.  Most heart attacks involve discomfort in the center of the chest that lasts more than a few minutes, or that goes away and comes back. It can feel like uncomfortable pressure, squeezing, fullness, or pain.  Discomfort in other areas of the upper body. Symptoms can include pain or discomfort in one or both arms, the back, neck, jaw, or stomach.  Shortness of breath with or without chest discomfort.  Other signs may include breaking out in a cold sweat, nausea, or lightheadedness. Don't wait more than five minutes to call 911 - MINUTES MATTER! Fast action can save your life. Calling 911 is almost always the fastest way to get lifesaving treatment. Emergency Medical Services staff can begin treatment when they arrive -- up to an hour sooner than if someone gets to the hospital by car. Patient discharged without removing armband and transfered to another healthcare acute, sub acute , or extended care facility. Informed of privacy risks if armband lost or stolen     The discharge information has been reviewed with the patient. The patient verbalized understanding. Discharge medications reviewed with the patient and appropriate educational materials and side effects teaching were provided.   ___________________________________________________________________________________________________________________________________

## 2019-03-13 NOTE — PROGRESS NOTES
Cough assist therapy is performed. Sxn moderate amount white secretions. Ambu bag and inner cannulas are at bedside. Changed inner cannula. Cough assist therapy performed. Sxn small amount white secretions.  3/13/2019 0050 AM.

## 2019-03-13 NOTE — PROGRESS NOTES
Problem: Falls - Risk of  Goal: *Absence of Falls  Document [place] Fall Risk and appropriate interventions in the flowsheet. Outcome: Progressing Towards Goal  Fall Risk Interventions:       Mentation Interventions: Adequate sleep, hydration, pain control, Bed/chair exit alarm    Medication Interventions: Bed/chair exit alarm    Elimination Interventions: Call light in reach             Problem: Pressure Injury - Risk of  Goal: *Prevention of pressure injury  Document [place] Scale and appropriate interventions in the flowsheet. Outcome: Progressing Towards Goal  Pressure Injury Interventions:  Sensory Interventions: Pressure redistribution bed/mattress (bed type), Keep linens dry and wrinkle-free    Moisture Interventions: Absorbent underpads, Apply protective barrier, creams and emollients, Maintain skin hydration (lotion/cream)    Activity Interventions: Pressure redistribution bed/mattress(bed type)    Mobility Interventions: HOB 30 degrees or less, Pressure redistribution bed/mattress (bed type), Turn and reposition approx.  every two hours(pillow and wedges)    Nutrition Interventions: Document food/fluid/supplement intake    Friction and Shear Interventions: Foam dressings/transparent film/skin sealants, HOB 30 degrees or less, Minimize layers               Problem: Patient Education: Go to Patient Education Activity  Goal: Patient/Family Education  Outcome: Not Met  Pt quadriplegic and on bed complete bed rest.

## 2019-03-13 NOTE — PROGRESS NOTES
Problem: Falls - Risk of  Goal: *Absence of Falls  Document [place] Fall Risk and appropriate interventions in the flowsheet. Outcome: Progressing Towards Goal  Fall Risk Interventions:       Mentation Interventions: Adequate sleep, hydration, pain control, Bed/chair exit alarm    Medication Interventions: Bed/chair exit alarm    Elimination Interventions: Call light in reach             Problem: Patient Education: Go to Patient Education Activity  Goal: Patient/Family Education  Outcome: Not Met  Pt has pressure wound on the sacra area  POA. Problem: Pressure Injury - Risk of  Goal: *Prevention of pressure injury  Document Sarwat Scale and appropriate interventions in the flowsheet. Outcome: Not Met  Pressure Injury Interventions:  Sensory Interventions: Pressure redistribution bed/mattress (bed type), Keep linens dry and wrinkle-free    Moisture Interventions: Absorbent underpads, Apply protective barrier, creams and emollients, Maintain skin hydration (lotion/cream)    Activity Interventions: Pressure redistribution bed/mattress(bed type)    Mobility Interventions: HOB 30 degrees or less, Pressure redistribution bed/mattress (bed type), Turn and reposition approx. every two hours(pillow and wedges)    Nutrition Interventions: Document food/fluid/supplement intake    Friction and Shear Interventions: Foam dressings/transparent film/skin sealants, HOB 30 degrees or less, Minimize layers               Problem: Patient Education: Go to Patient Education Activity  Goal: Patient/Family Education  Outcome: Progressing Towards Goal  Pt quadraplegic    Problem: Impaired Skin Integrity/Pressure Injury Treatment  Goal: *Prevention of pressure injury  Document Sarwat Scale and appropriate interventions in the flowsheet.   Outcome: Progressing Towards Goal  Pressure Injury Interventions:  Sensory Interventions: Pressure redistribution bed/mattress (bed type), Keep linens dry and wrinkle-free    Moisture Interventions: Absorbent underpads, Apply protective barrier, creams and emollients, Maintain skin hydration (lotion/cream)    Activity Interventions: Pressure redistribution bed/mattress(bed type)    Mobility Interventions: HOB 30 degrees or less, Pressure redistribution bed/mattress (bed type), Turn and reposition approx.  every two hours(pillow and wedges)    Nutrition Interventions: Document food/fluid/supplement intake    Friction and Shear Interventions: Foam dressings/transparent film/skin sealants, HOB 30 degrees or less, Minimize layers

## 2019-03-13 NOTE — PROGRESS NOTES
Transportation at Discharge:  03/13/2019    Transport Company/Representative: RODOLFO Arellanoy from [employer] arranged transport with [employer] per the requirements of the pts [facility] Medicaid.      Transportation Phone number:   [employer]:  [phone]  [employer]:  [phone]    Method of Transport: Stretcher / BLS    Estimated pick-up time: 1:00P  Destination: [address] [place], [place] [address]way: [facility] / [facility]  Authorization: [id number] Per Tarah Fontana at [employer]    Requesting Outcomes Manager: Toribio Simpson, [address] [facility] Specialist ext [phone]

## 2019-03-13 NOTE — ANCILLARY DISCHARGE INSTRUCTIONS
Patient and/or next of kin has been given the [facility] Important Message From Medicare About Your Rights\" letter and all questions were answered. Patient unable to sign, patient and family is aware on Important Message From Medicare, copy given.

## 2019-07-26 ENCOUNTER — HOSPITAL ENCOUNTER (OUTPATIENT)
Dept: LAB | Age: 39
Discharge: HOME | End: 2019-07-26

## 2019-07-26 LAB
ANION GAP SERPL CALC-SCNC: 5 MMOL/L (ref 3–18)
BUN SERPL-MCNC: 14 MG/DL (ref 7–18)
BUN/CREAT SERPL: 25 (ref 12–20)
CALCIUM SERPL-MCNC: 9 MG/DL (ref 8.5–10.1)
CHLORIDE SERPL-SCNC: 104 MMOL/L (ref 100–111)
CO2 SERPL-SCNC: 30 MMOL/L (ref 21–32)
CREAT SERPL-MCNC: 0.55 MG/DL (ref 0.6–1.3)
ERYTHROCYTE [DISTWIDTH] IN BLOOD BY AUTOMATED COUNT: 16.7 % (ref 11.6–14.5)
GLUCOSE SERPL-MCNC: 218 MG/DL (ref 74–99)
HCT VFR BLD AUTO: 33.6 % (ref 36–48)
HGB BLD-MCNC: 9.3 G/DL (ref 13–16)
MCH RBC QN AUTO: 22.1 PG (ref 24–34)
MCHC RBC AUTO-ENTMCNC: 27.7 G/DL (ref 31–37)
MCV RBC AUTO: 80 FL (ref 74–97)
PLATELET # BLD AUTO: 361 K/UL (ref 135–420)
PMV BLD AUTO: 10.2 FL (ref 9.2–11.8)
POTASSIUM SERPL-SCNC: 4.3 MMOL/L (ref 3.5–5.5)
RBC # BLD AUTO: 4.2 M/UL (ref 4.7–5.5)
SODIUM SERPL-SCNC: 139 MMOL/L (ref 136–145)
WBC # BLD AUTO: 5.2 K/UL (ref 4.6–13.2)

## 2019-07-26 PROCEDURE — 85027 COMPLETE CBC AUTOMATED: CPT

## 2019-07-26 PROCEDURE — 80048 BASIC METABOLIC PNL TOTAL CA: CPT

## 2019-09-26 ENCOUNTER — HOSPITAL ENCOUNTER (OUTPATIENT)
Dept: LAB | Age: 39
Discharge: HOME | End: 2019-09-26

## 2019-09-26 PROCEDURE — 85025 COMPLETE CBC W/AUTO DIFF WBC: CPT

## 2019-09-26 PROCEDURE — 80048 BASIC METABOLIC PNL TOTAL CA: CPT

## 2019-09-27 LAB
ANION GAP SERPL CALC-SCNC: 12 MMOL/L (ref 3–18)
BASOPHILS # BLD: 0 K/UL (ref 0–0.1)
BASOPHILS NFR BLD: 0 % (ref 0–3)
BUN SERPL-MCNC: 26 MG/DL (ref 7–18)
BUN/CREAT SERPL: 31 (ref 12–20)
CALCIUM SERPL-MCNC: 10.7 MG/DL (ref 8.5–10.1)
CHLORIDE SERPL-SCNC: 97 MMOL/L (ref 100–111)
CO2 SERPL-SCNC: 23 MMOL/L (ref 21–32)
CREAT SERPL-MCNC: 0.83 MG/DL (ref 0.6–1.3)
DIFFERENTIAL METHOD BLD: ABNORMAL
EOSINOPHIL # BLD: 0.2 K/UL (ref 0–0.4)
EOSINOPHIL NFR BLD: 2 % (ref 0–5)
ERYTHROCYTE [DISTWIDTH] IN BLOOD BY AUTOMATED COUNT: 16.9 % (ref 11.6–14.5)
GLUCOSE SERPL-MCNC: 135 MG/DL (ref 74–99)
HCT VFR BLD AUTO: 38.2 % (ref 36–48)
HGB BLD-MCNC: 11.6 G/DL (ref 13–16)
LYMPHOCYTES # BLD: 1.6 K/UL (ref 0.8–3.5)
LYMPHOCYTES NFR BLD: 14 % (ref 20–51)
MCH RBC QN AUTO: 22.7 PG (ref 24–34)
MCHC RBC AUTO-ENTMCNC: 30.4 G/DL (ref 31–37)
MCV RBC AUTO: 74.6 FL (ref 74–97)
MONOCYTES # BLD: 1.2 K/UL (ref 0–1)
MONOCYTES NFR BLD: 10 % (ref 2–9)
NEUTS BAND NFR BLD MANUAL: 4 % (ref 0–5)
NEUTS SEG # BLD: 8.1 K/UL (ref 1.8–8)
NEUTS SEG NFR BLD: 70 % (ref 42–75)
PLATELET # BLD AUTO: 330 K/UL (ref 135–420)
PMV BLD AUTO: 10.1 FL (ref 9.2–11.8)
POTASSIUM SERPL-SCNC: 3.1 MMOL/L (ref 3.5–5.5)
RBC # BLD AUTO: 5.12 M/UL (ref 4.7–5.5)
RBC MORPH BLD: ABNORMAL
RBC MORPH BLD: ABNORMAL
SODIUM SERPL-SCNC: 132 MMOL/L (ref 136–145)
WBC # BLD AUTO: 11.5 K/UL (ref 4.6–13.2)

## 2019-11-01 NOTE — CONSULTS
[facility]  Pulmonary, Critical Care, and Sleep Medicine    Name: Karen López MRN: [id number]   : 1980 Hospital: [facility]   Date: 2019        Critical Care Consult      IMPRESSION:   · Acute Hypercarbic/Hypoxic Respiratory Failure-likely due to left basilar atelectasis and probable pneumonia  · Shock -Autonomic dysfunction vs distributive septic shock vs hypovolemic? - Resolved  · LL Lobe Pneumonia (aspiration vs CAP)-  left lower lobe pneumonia with progressive peribronchial  · groundglass density noted in the right upper lobe and right lower lobe  · Acute Encephalopathy- likely 2/2 to above- Resolved  · SONDRA likely due to above- Resolved  · Electrolyte derangements    Hx:  · Quadriplegia  · Tracheostomy- Arrived with sz. 6 cuffless trach, trach changed to sz 6 Fenestrated trach   · Cecostomy  · DM     Patient Active Problem List   Diagnosis Code    Sepsis ([facility] A41.9    UTI (urinary tract infection) N39.0    DM (diabetes mellitus) ([facility] E11.9    Acute on chronic respiratory failure with hypoxia (HCC) J96.21    GERD (gastroesophageal reflux disease) K21.9    Quadriplegia ([facility] G82.50    Seizures ([address] R56.9    Hypotension I95.9    Unresponsive episode R41.89        RECOMMENDATIONS:   · Resp: Maintain on Trach collar for SpO2 >94%, Mucomyst Neb soln, Duonebs,   · I/D: Afebrile; aleukocytosis; f/u BxCx/UC, Sputum Cx's. LA ordered. ABX : Vanc, Zosyn, Levaquin  · Hem/Onc: Daily CBC; H/H, and plts are stable  · CVS: Obtain BP in lower extremities for more consistency, Check cardiac panel, ECHO obtained. · Metabolic: Daily BMP; monitor e-lytes; replace PRN  · Renal: Trend Renal indices;  Lora   · Endocrine: POC Glucose q6  · GI: SUP, Speech Path for swallow eval,Zofran PRN for N/V   · Musc/Skin: Multiple wound breakdown  · Neuro: PRN pain medications  · Code Status: Full Code     Best practice:  · Sepsis Bundle per Hospital Protocol  · Glycemic control; avoid letter was picked up from  by Christine Hu. ID was checked and patient  label was attached to patient  log.     Janell Toussaint   Hypoglycemia  · IHI ICU Bundles:  ·  Cabrera Bundle Followed    · Mech Vent patients/ Pulmonary pts:   · VAP bundle, Aim to keep peak plateau pressure 57-53XI H2O  · Aspiration Precautions - HOB >30'  · Daily sedation holiday as indicated  · SBT as tolerated/appropriate  · Stress ulcer prophylaxis. Protonix   · DVT prophylaxis. SCD's  · Need for Lines, cabrera assessed. · Restraints need. · Palliative care evaluation. Subjective/History:     Patient is a 34 y.o. male  is a 34 y.o. male from [place] with PMHx of Quadriplegia, Depression, DM, CKD, and Cecostomy who presented to the ED with unresponsive episode and hypoxia. He denied any sx prior to the event. Recently hospitalized for PNA and sepsis, treated with IV Abx (vanco and Zosyn) and discharged yesterday on oral Levaquin.      ED evaluation revealed a hypotensive patient, poorly responsive. Blood glucose normal. Given 3L IVF and slowly improved. He was also given 2x 20mcg boluses of Epinephrine while in the ED. Of note, patient was unable to be placed on the vent with his cuffless trach; trach was changed by Hospitalist, and patient maintained O2 sats on trach collar. Upon my assessment, patient was alert and able to mouth words logically. He maintained his O2 sats and will be seen by Speech Pathology for further swallow eval. Of note, patient had multiple wounds and DTI's.  Wound care was consulted for skin eval.  He will be consulted by ICU for observation of shock, encephalopathy, and respiratory distress.           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 No pertinent surgical history. Prior to Admission medications    Medication Sig Start Date End Date Taking? Authorizing Provider   levoFLOXacin (LEVAQUIN) 750 mg tablet Take 1 Tab by mouth daily for 7 days. 2/25/19 3/4/19  Ekaterina Andrews MD   multivitamin (DAILY MULTIPLE) tablet Take 1 Tab by mouth daily. Jose Negrete MD   collagenase (SANTYL) 250 unit/gram ointment Apply  to affected area daily. Jose Negrete MD   baclofen (LIORESAL) 20 mg tablet  11/21/18   Jose Negrete MD   buPROPion SR (WELLBUTRIN SR) 150 mg SR tablet  11/7/18   Jose Negrete MD   DULoxetine (CYMBALTA) 60 mg capsule  12/15/18   Jose Negrete MD   fentaNYL (DURAGESIC) 25 mcg/hr [facility]  12/5/18   Jose Negrete MD   gabapentin (NEURONTIN) 600 mg tablet  11/21/18   Jose Negrete MD   glipiZIDE SR (GLUCOTROL XL) 10 mg CR tablet  12/5/18   Jose Negrete MD   cyclobenzaprine (FLEXERIL) 10 mg tablet Take  by mouth three (3) times daily as needed for Muscle Spasm(s). Jose Negrete MD   lactobacillus acidoph-pectin (ACIDOPHILUS-PECTIN) 75 million cell -100 mg cap capsule Take 1 Cap by mouth daily. Jose Negrete MD   levETIRAcetam (KEPPRA XR) 500 mg ER tablet Take 500 mg by mouth daily. Jose Negrete MD   metFORMIN (GLUCOPHAGE) 500 mg tablet Take  by mouth two (2) times daily (with meals). Jose Negrete MD   omeprazole (PRILOSEC) 20 mg capsule Take 20 mg by mouth daily. Jose Negrete MD   amino acids/protein hydrolys (PRO-STAT SUGAR FREE PO) Take  by mouth. Jose Negrete MD   mirtazapine (REMERON) 7.5 mg tablet Take 7.5 mg by mouth nightly. Jose Negrete MD   collagenase (SANTYL) 250 unit/gram ointment Apply  to affected area daily. Jose Negrete MD   acetaminophen (TYLENOL) 325 mg tablet Take 650 mg by mouth every four (4) hours as needed for Pain.     Jose Negrete MD       Current Facility-Administered Medications   Medication Dose Route Frequency    vancomycin (VANCOCIN) 1,000 mg in 0.9% sodium chloride (MBP/ADV) 250 mL adv  1,000 mg IntraVENous Q8H    [START ON 2/28/2019] VANCOMYCIN INFORMATION NOTE   Other ONCE    VANCOMYCIN INFORMATION NOTE 1 Each  1 Each Other Rx Dosing/Monitoring    0.9% sodium chloride with KCl 20 mEq/L infusion   IntraVENous CONTINUOUS    baclofen (LIORESAL) tablet 10 mg  10 mg Oral BID    buPROPion SR (WELLBUTRIN SR) tablet 150 mg  150 mg Oral DAILY    collagenase (SANTYL) 250 unit/gram ointment   Topical DAILY    DULoxetine (CYMBALTA) capsule 60 mg  60 mg Oral DAILY    fentaNYL (DURAGESIC) 25 mcg/hr patch 1 Patch  1 Patch TransDERmal Q72H    gabapentin (NEURONTIN) capsule 600 mg  600 mg Oral TID    Lactobacillus Acidoph & Bulgar (FLORANEX) tablet 1 Tab  1 Tab Oral DAILY    levETIRAcetam (KEPPRA XR) ER tablet 500 mg  500 mg Oral DAILY    mirtazapine (REMERON) tablet 7.5 mg  7.5 mg Oral QHS    multivitamin (ONE A DAY) tablet 1 Tab  1 Tab Oral DAILY    pantoprazole (PROTONIX) tablet 20 mg  20 mg Oral DAILY    insulin lispro (HUMALOG) injection   SubCUTAneous AC&HS    midodrine (PROAMITINE) tablet 10 mg  10 mg Oral TID WITH MEALS    albuterol-ipratropium (DUO-NEB) 2.5 MG-0.5 MG/3 ML  3 mL Nebulization Q4H RT    acetylcysteine (MUCOMYST) 100 mg/mL (10 %) nebulizer solution 400 mg  4 mL Nebulization Q8H RT    EPINEPHrine (ADRENALIN) 0.1 mg/mL syringe 0.02 mg  0.02 mg IntraVENous NOW    albumin human 25% (BUMINATE) solution 12.5 g  12.5 g IntraVENous ONCE    potassium chloride SR (KLOR-CON 10) tablet 40 mEq  40 mEq Oral ONCE    piperacillin-tazobactam (ZOSYN) 4.5 g in 0.9% sodium chloride (MBP/ADV) 100 mL MBP  #EXTENDED 4-HOUR INFUSION##  4.5 g IntraVENous Q8H    levoFLOXacin (LEVAQUIN) 750 mg in D5W IVPB  750 mg IntraVENous Q24H       Allergies   Allergen Reactions    Heparin Other (comments)        Social History     Tobacco Use    Smoking status: Never Smoker    Smokeless tobacco: Never Used   Substance Use Topics    Alcohol use: No     Frequency: Never        History reviewed. No pertinent family history. Review of Systems:  Pertinent items are noted in HPI. Objective:   Vital Signs:    Visit Vitals  BP 91/50   Pulse 93   Temp 97.3 °F (36.3 °C)   Resp 19   Ht 6' (1.829 m)   Wt 57.2 kg (126 lb)   SpO2 97%   BMI 17.09 kg/m²       O2 Device: Tracheostomy, Ventilator       Temp (24hrs), Av.2 °F (36.2 °C), Min:96.6 °F (35.9 °C), Max:97.7 °F (36.5 °C)       Intake/Output:   Last shift:      No intake/output data recorded. Last 3 shifts:  190 -  0700  In: 250 [I.V.:250]  Out: 2000 [Urine:2000]    Intake/Output Summary (Last 24 hours) at 2019 0826  Last data filed at 2019 0700  Gross per 24 hour   Intake 250 ml   Output 2000 ml   Net -1750 ml       Ventilator Settings:  Mode Rate Tidal Volume Pressure FiO2 PEEP   Assist control, VC+   450 ml    50 % 5 cm H20     Peak airway pressure: 15 cm H2O    Minute ventilation: 5.7 l/min      ARDS network Guidelines:   Lung protective strategy and Plateau  Pressure goal < 30 cm H2O goals  Oxygenation Goals PaO2 55-80 mm Hg or SaO2 88-95%  PH goal 7.30-7.45    VAP bundle:  Reviewed. Izabella tube to suction at 20-30 cm Hg. Maintain Izabella tube with 5-10ml air every 4 hours  Routine oral care every 4 hours  Elevation of head > 45 degree  Daily sedation holiday and SBT evaluation starting at 6.00am.    Physical Exam:     General:  Intubated/Sedated or Alert, cooperative, NAD   Head:  Normocephalic, without obvious abnormality, atraumatic. Eyes:  Conjunctivae/corneas clear. PERRL,   Nose: Nares normal. Septum midline. Mucosa normal. No drainage or sinus tenderness. Throat: Lips, mucosa, and tongue normal. Teeth and gums normal.   Neck: Supple, symmetrical, trachea midline, no adenopathy, no carotid bruit and no JVD.    Lungs:   Symmetrical chest rise; good AE bilat; CTAB; no wheezes/rhonchi/rales noted. Heart:  RRR, S1, S2 normal, no m/r/g   Abdomen:   Soft, non-tender. Bowel sounds normal. No masses,  No organomegaly. Extremities: Extremities normal, atraumatic, no cyanosis or edema. Pulses: 2+ and symmetric all extremities. Skin: Skin color, texture, turgor normal. No rashes or lesions   Neurologic: Grossly nonfocal     Devices:  · ETT:  · OGT:  · Lines:  · Drains:  · Lora:    Data:     Recent Results (from the past 24 hour(s))   METABOLIC PANEL, COMPREHENSIVE    Collection Time: 02/26/19 11:30 PM   Result Value Ref Range    Sodium 132 (L) 136 - 145 mmol/L    Potassium 3.1 (L) 3.5 - 5.5 mmol/L    Chloride 94 (L) 100 - 108 mmol/L    CO2 28 21 - 32 mmol/L    Anion gap 10 3.0 - 18 mmol/L    Glucose 110 (H) 74 - 99 mg/dL    BUN 16 7.0 - 18 MG/DL    Creatinine 1.26 0.6 - 1.3 MG/DL    BUN/Creatinine ratio 13 12 - 20      GFR est AA >60 >60 ml/min/1.73m2    GFR est non-AA >60 >60 ml/min/1.73m2    Calcium 7.7 (L) 8.5 - 10.1 MG/DL    Bilirubin, total 0.2 0.2 - 1.0 MG/DL    ALT (SGPT) 16 16 - 61 U/L    AST (SGOT) 14 (L) 15 - 37 U/L    Alk. phosphatase 99 45 - 117 U/L    Protein, total 6.6 6.4 - 8.2 g/dL    Albumin 2.8 (L) 3.4 - 5.0 g/dL    Globulin 3.8 2.0 - 4.0 g/dL    A-G Ratio 0.7 (L) 0.8 - 1.7     CBC WITH AUTOMATED DIFF    Collection Time: 02/26/19 11:30 PM   Result Value Ref Range    WBC 9.7 4.6 - 13.2 K/uL    RBC 3.68 (L) 4.70 - 5.50 M/uL    HGB 9.2 (L) 13.0 - 16.0 g/dL    HCT 29.4 (L) 36.0 - 48.0 %    MCV 79.9 74.0 - 97.0 FL    MCH 25.0 24.0 - 34.0 PG    MCHC 31.3 31.0 - 37.0 g/dL    RDW 16.8 (H) 11.6 - 14.5 %    PLATELET 714 276 - 464 K/uL    MPV 9.3 9.2 - 11.8 FL    NEUTROPHILS 79 (H) 40 - 73 %    LYMPHOCYTES 10 (L) 21 - 52 %    MONOCYTES 9 3 - 10 %    EOSINOPHILS 2 0 - 5 %    BASOPHILS 0 0 - 2 %    ABS. NEUTROPHILS 7.6 1.8 - 8.0 K/UL    ABS. LYMPHOCYTES 1.0 0.9 - 3.6 K/UL    ABS. MONOCYTES 0.9 0.05 - 1.2 K/UL    ABS. EOSINOPHILS 0.2 0.0 - 0.4 K/UL    ABS.  BASOPHILS 0.0 0.0 - 0.1 K/UL    DF AUTOMATED     CARDIAC PANEL,(CK, CKMB & TROPONIN)    Collection Time: 02/26/19 11:30 PM   Result Value Ref Range     39 - 308 U/L    CK - MB 1.7 <3.6 ng/ml    CK-MB Index 1.5 0.0 - 4.0 %    Troponin-I, QT <0.02 0.0 - 0.045 NG/ML   MAGNESIUM    Collection Time: 02/26/19 11:30 PM   Result Value Ref Range    Magnesium 1.6 1.6 - 2.6 mg/dL   EKG, 12 LEAD, INITIAL    Collection Time: 02/26/19 11:33 PM   Result Value Ref Range    Ventricular Rate 89 BPM    Atrial Rate 89 BPM    P-R Interval 134 ms    QRS Duration 90 ms    Q-T Interval 494 ms    QTC Calculation (Bezet) 601 ms    Calculated P Axis 58 degrees    Calculated R Axis 58 degrees    Calculated T Axis 67 degrees    Diagnosis       Normal sinus rhythm  Possible Left atrial enlargement  Left ventricular hypertrophy  Prolonged QT  Abnormal ECG  When compared with ECG of 23-FEB-2019 01:04,  ST elevation now present in Lateral leads  Nonspecific T wave abnormality, improved in Lateral leads     POC LACTIC ACID    Collection Time: 02/26/19 11:52 PM   Result Value Ref Range    Lactic Acid (POC) 2.11 (HH) 0.40 - 2.00 mmol/L   URINALYSIS W/ RFLX MICROSCOPIC    Collection Time: 02/26/19 11:59 PM   Result Value Ref Range    Color YELLOW      Appearance CLEAR      Specific gravity 1.008 1.005 - 1.030      pH (UA) 7.5 5.0 - 8.0      Protein NEGATIVE  NEG mg/dL    Glucose NEGATIVE  NEG mg/dL    Ketone 40 (A) NEG mg/dL    Bilirubin NEGATIVE  NEG      Blood NEGATIVE  NEG      Urobilinogen 0.2 0.2 - 1.0 EU/dL    Nitrites NEGATIVE  NEG      Leukocyte Esterase NEGATIVE  NEG     POC LACTIC ACID    Collection Time: 02/27/19  2:24 AM   Result Value Ref Range    Lactic Acid (POC) 1.20 0.40 - 2.00 mmol/L   GLUCOSE, POC    Collection Time: 02/27/19  8:21 AM   Result Value Ref Range    Glucose (POC) 122 (H) 70 - 110 mg/dL           No results for input(s): FIO2I, IFO2, HCO3I, IHCO3, HCOPOC, PCO2I, PCOPOC, IPHI, PHI, PHPOC, PO2I, PO2POC in the last 72 hours.     No lab exists for component: IPOC2    Telemetry:normal sinus rhythm    Imaging:  I have personally reviewed the patients radiographs and have reviewed the reports:    CXR [date]:  CXR Results  (Last 48 hours)               02/27/19 0842  XR CHEST PORT Final result    Impression:  IMPRESSION:           1. Interval progression of left basilar atelectasis and probable pneumonia. 2. Developing right suprahilar and medial right basilar streaky opacities,   suggesting additional pneumonia and/or atelectasis. 3. Minimal bilateral pleural effusions versus pleural thickening. Narrative:  EXAM: CHEST RADIOGRAPH, SINGLE VIEW       CLINICAL INDICATION/HISTORY: respiratory distress     >ADDITIONAL HISTORY: None. COMPARISON: 2/26/2019       TECHNIQUE: Portable frontal view of the chest was obtained.        _______________       FINDINGS:       SUPPORT DEVICES: Tracheostomy is again noted in the trachea. HEART AND MEDIASTINUM: Cardiomediastinal silhouette appears within normal   limits. LUNGS AND PLEURAL SPACES: Pulmonary vessels are normal. Consolidation is again   noted in the medial left lung base increasing in density with a straight lateral   demarcation. Mild streaky density is present lateral left lung base without   change in the may be some early streaky infiltrate developing right suprahilar   and medial right basilar regions. Both costophrenic angles are minimally   blunted. BONY THORAX AND SOFT TISSUES: No acute osseous abnormality. _______________           02/27/19 0039  XR CHEST PORT Final result    Impression:  IMPRESSION:       Minimal streaking left lung base without significant change, atelectasis or   scarring, streaky infiltrate not completely excluded. Narrative:  EXAM: CHEST RADIOGRAPH, SINGLE VIEW       CLINICAL INDICATION/HISTORY: meets SIRS criteria     >ADDITIONAL HISTORY: Altered mental status.        COMPARISON: 2/23/2019       TECHNIQUE: Portable frontal view of the chest was obtained.        _______________       FINDINGS:       SUPPORT DEVICES: Tracheostomy remains in the trachea unchanged. HEART AND MEDIASTINUM: Cardiac silhouette is within normal range in size. Small   amount of calcified plaque is present at the aortic arch. LUNGS AND PLEURAL SPACES: Pulmonary vessels are normal. Minimal streaking is   present at the mid to lateral left lung base without significant changes. No   pneumothorax or pleural effusion. BONY THORAX AND SOFT TISSUES: Mild degenerative changes again noted at both   shoulders. _______________                 CT HEAD/CHEST/ABD/PELVIS [date]: 02/27/19  CT Chest Impressions:  1. No evidence of pulmonary embolism.     2.  Redemonstration of left lower lobe pneumonia with progressive peribronchial  groundglass density noted in the right upper lobe and right lower lobe. Multiple  foci of endobronchial secretions as above. Trace left pleural effusion.     3. Tracheostomy catheter in stable position.      Note: Preliminary report sent to the Emergency Department by the radiology  resident at the time of the study          Total of  60   min critical care time spent at bedside during the course of care providing evaluation,management and care decisions and ordering appropriate treatment related to critical care problems exclusive of procedures. The reason for providing this level of medical care for this critically ill patient was due a critical illness that impaired one or more vital organ systems such that there was a high probability of imminent or life threatening deterioration in the patients condition. This care involved high complexity decision making to assess, manipulate, and support vital system functions, to treat this degree vital organ system failure and to prevent further life threatening deterioration of the patients condition.       Angie Mckee NP-BC     Pulmonary, [phone] [facility]

## 2020-01-12 PROCEDURE — 80048 BASIC METABOLIC PNL TOTAL CA: CPT

## 2020-01-12 PROCEDURE — 85025 COMPLETE CBC W/AUTO DIFF WBC: CPT

## 2020-01-12 PROCEDURE — 36415 COLL VENOUS BLD VENIPUNCTURE: CPT

## 2020-01-13 ENCOUNTER — HOSPITAL ENCOUNTER (OUTPATIENT)
Dept: LAB | Age: 40
Discharge: HOME | End: 2020-01-13

## 2020-01-13 LAB
ANION GAP SERPL CALC-SCNC: 10 MMOL/L (ref 3–18)
BASOPHILS # BLD: 0 K/UL (ref 0–0.1)
BASOPHILS NFR BLD: 0 % (ref 0–3)
BUN SERPL-MCNC: 14 MG/DL (ref 7–18)
BUN/CREAT SERPL: 21 (ref 12–20)
CALCIUM SERPL-MCNC: 9 MG/DL (ref 8.5–10.1)
CHLORIDE SERPL-SCNC: 99 MMOL/L (ref 100–111)
CO2 SERPL-SCNC: 23 MMOL/L (ref 21–32)
CREAT SERPL-MCNC: 0.67 MG/DL (ref 0.6–1.3)
DIFFERENTIAL METHOD BLD: ABNORMAL
EOSINOPHIL # BLD: 0.4 K/UL (ref 0–0.4)
EOSINOPHIL NFR BLD: 2 % (ref 0–5)
ERYTHROCYTE [DISTWIDTH] IN BLOOD BY AUTOMATED COUNT: 17.7 % (ref 11.6–14.5)
GLUCOSE SERPL-MCNC: 181 MG/DL (ref 74–99)
HCT VFR BLD AUTO: 35 % (ref 36–48)
HGB BLD-MCNC: 9.5 G/DL (ref 13–16)
LYMPHOCYTES # BLD: 0.8 K/UL (ref 0.8–3.5)
LYMPHOCYTES NFR BLD: 4 % (ref 20–51)
MCH RBC QN AUTO: 20.1 PG (ref 24–34)
MCHC RBC AUTO-ENTMCNC: 27.1 G/DL (ref 31–37)
MCV RBC AUTO: 74 FL (ref 74–97)
MONOCYTES # BLD: 1.2 K/UL (ref 0–1)
MONOCYTES NFR BLD: 6 % (ref 2–9)
NEUTS SEG # BLD: 17.2 K/UL (ref 1.8–8)
NEUTS SEG NFR BLD: 88 % (ref 42–75)
PLATELET # BLD AUTO: 497 K/UL (ref 135–420)
PMV BLD AUTO: 10.7 FL (ref 9.2–11.8)
POTASSIUM SERPL-SCNC: 4.2 MMOL/L (ref 3.5–5.5)
RBC # BLD AUTO: 4.73 M/UL (ref 4.7–5.5)
RBC MORPH BLD: ABNORMAL
SODIUM SERPL-SCNC: 132 MMOL/L (ref 136–145)
WBC # BLD AUTO: 19.6 K/UL (ref 4.6–13.2)

## 2020-01-16 ENCOUNTER — HOSPITAL ENCOUNTER (OUTPATIENT)
Dept: LAB | Age: 40
Discharge: HOME | End: 2020-01-16

## 2020-01-16 LAB
ANION GAP SERPL CALC-SCNC: 8 MMOL/L (ref 3–18)
BUN SERPL-MCNC: 20 MG/DL (ref 7–18)
BUN/CREAT SERPL: 26 (ref 12–20)
CALCIUM SERPL-MCNC: 9.3 MG/DL (ref 8.5–10.1)
CHLORIDE SERPL-SCNC: 102 MMOL/L (ref 100–111)
CO2 SERPL-SCNC: 26 MMOL/L (ref 21–32)
CREAT SERPL-MCNC: 0.76 MG/DL (ref 0.6–1.3)
ERYTHROCYTE [DISTWIDTH] IN BLOOD BY AUTOMATED COUNT: 17.6 % (ref 11.6–14.5)
GLUCOSE SERPL-MCNC: 178 MG/DL (ref 74–99)
HCT VFR BLD AUTO: 36.1 % (ref 36–48)
HGB BLD-MCNC: 9.7 G/DL (ref 13–16)
MAGNESIUM SERPL-MCNC: 2 MG/DL (ref 1.6–2.6)
MCH RBC QN AUTO: 19.4 PG (ref 24–34)
MCHC RBC AUTO-ENTMCNC: 26.9 G/DL (ref 31–37)
MCV RBC AUTO: 72.3 FL (ref 74–97)
PLATELET # BLD AUTO: 448 K/UL (ref 135–420)
PMV BLD AUTO: 10.1 FL (ref 9.2–11.8)
POTASSIUM SERPL-SCNC: 4 MMOL/L (ref 3.5–5.5)
RBC # BLD AUTO: 4.99 M/UL (ref 4.7–5.5)
SODIUM SERPL-SCNC: 136 MMOL/L (ref 136–145)
WBC # BLD AUTO: 6.2 K/UL (ref 4.6–13.2)

## 2020-01-16 PROCEDURE — 83735 ASSAY OF MAGNESIUM: CPT

## 2020-01-16 PROCEDURE — 85027 COMPLETE CBC AUTOMATED: CPT

## 2020-01-16 PROCEDURE — 80048 BASIC METABOLIC PNL TOTAL CA: CPT

## 2020-01-24 ENCOUNTER — HOSPITAL ENCOUNTER (OUTPATIENT)
Dept: LAB | Age: 40
Discharge: HOME | End: 2020-01-24

## 2020-01-24 LAB
ANION GAP SERPL CALC-SCNC: 5 MMOL/L (ref 3–18)
BASOPHILS # BLD: 0 K/UL (ref 0–0.1)
BASOPHILS NFR BLD: 0 % (ref 0–2)
BUN SERPL-MCNC: 21 MG/DL (ref 7–18)
BUN/CREAT SERPL: 42 (ref 12–20)
CALCIUM SERPL-MCNC: 9 MG/DL (ref 8.5–10.1)
CHLORIDE SERPL-SCNC: 105 MMOL/L (ref 100–111)
CO2 SERPL-SCNC: 29 MMOL/L (ref 21–32)
CREAT SERPL-MCNC: 0.5 MG/DL (ref 0.6–1.3)
DIFFERENTIAL METHOD BLD: ABNORMAL
EOSINOPHIL # BLD: 0.4 K/UL (ref 0–0.4)
EOSINOPHIL NFR BLD: 5 % (ref 0–5)
ERYTHROCYTE [DISTWIDTH] IN BLOOD BY AUTOMATED COUNT: 17.8 % (ref 11.6–14.5)
GLUCOSE SERPL-MCNC: 104 MG/DL (ref 74–99)
HCT VFR BLD AUTO: 34.7 % (ref 36–48)
HGB BLD-MCNC: 9.1 G/DL (ref 13–16)
LYMPHOCYTES # BLD: 1.5 K/UL (ref 0.9–3.6)
LYMPHOCYTES NFR BLD: 17 % (ref 21–52)
MCH RBC QN AUTO: 19.4 PG (ref 24–34)
MCHC RBC AUTO-ENTMCNC: 26.2 G/DL (ref 31–37)
MCV RBC AUTO: 74.1 FL (ref 74–97)
MONOCYTES # BLD: 0.5 K/UL (ref 0.05–1.2)
MONOCYTES NFR BLD: 6 % (ref 3–10)
NEUTS SEG # BLD: 6 K/UL (ref 1.8–8)
NEUTS SEG NFR BLD: 72 % (ref 40–73)
PLATELET # BLD AUTO: 533 K/UL (ref 135–420)
PMV BLD AUTO: 10.3 FL (ref 9.2–11.8)
POTASSIUM SERPL-SCNC: 4.2 MMOL/L (ref 3.5–5.5)
RBC # BLD AUTO: 4.68 M/UL (ref 4.7–5.5)
SODIUM SERPL-SCNC: 139 MMOL/L (ref 136–145)
WBC # BLD AUTO: 8.4 K/UL (ref 4.6–13.2)

## 2020-01-24 PROCEDURE — 85025 COMPLETE CBC W/AUTO DIFF WBC: CPT

## 2020-01-24 PROCEDURE — 80048 BASIC METABOLIC PNL TOTAL CA: CPT

## 2020-09-03 ENCOUNTER — HOSPITAL ENCOUNTER (OUTPATIENT)
Dept: LAB | Age: 40
Discharge: HOME | End: 2020-09-03

## 2020-09-03 LAB
ERYTHROCYTE [DISTWIDTH] IN BLOOD BY AUTOMATED COUNT: 19.8 % (ref 11.6–14.5)
HCT VFR BLD AUTO: 39.8 % (ref 36–48)
HGB BLD-MCNC: 11.5 G/DL (ref 13–16)
MCH RBC QN AUTO: 21 PG (ref 24–34)
MCHC RBC AUTO-ENTMCNC: 28.9 G/DL (ref 31–37)
MCV RBC AUTO: 72.6 FL (ref 74–97)
PLATELET # BLD AUTO: 355 K/UL (ref 135–420)
PMV BLD AUTO: 10.3 FL (ref 9.2–11.8)
RBC # BLD AUTO: 5.48 M/UL (ref 4.7–5.5)
WBC # BLD AUTO: 7.4 K/UL (ref 4.6–13.2)

## 2020-09-03 PROCEDURE — 85027 COMPLETE CBC AUTOMATED: CPT

## 2020-10-02 ENCOUNTER — HOSPITAL ENCOUNTER (OUTPATIENT)
Dept: LAB | Age: 40
Discharge: HOME | End: 2020-10-02

## 2020-10-02 LAB
APPEARANCE UR: ABNORMAL
BACTERIA URNS QL MICRO: ABNORMAL /HPF
BASOPHILS # BLD: 0 K/UL (ref 0–0.1)
BASOPHILS NFR BLD: 0 % (ref 0–2)
BILIRUB UR QL: NEGATIVE
COLOR UR: YELLOW
DIFFERENTIAL METHOD BLD: ABNORMAL
EOSINOPHIL # BLD: 0.3 K/UL (ref 0–0.4)
EOSINOPHIL NFR BLD: 3 % (ref 0–5)
EPITH CASTS URNS QL MICRO: NEGATIVE /LPF (ref 0–5)
ERYTHROCYTE [DISTWIDTH] IN BLOOD BY AUTOMATED COUNT: 19.6 % (ref 11.6–14.5)
GLUCOSE UR STRIP.AUTO-MCNC: >1000 MG/DL
HCT VFR BLD AUTO: 33.7 % (ref 36–48)
HGB BLD-MCNC: 9.5 G/DL (ref 13–16)
HGB UR QL STRIP: ABNORMAL
KETONES UR QL STRIP.AUTO: NEGATIVE MG/DL
LEUKOCYTE ESTERASE UR QL STRIP.AUTO: ABNORMAL
LYMPHOCYTES # BLD: 1.2 K/UL (ref 0.9–3.6)
LYMPHOCYTES NFR BLD: 11 % (ref 21–52)
MCH RBC QN AUTO: 21.2 PG (ref 24–34)
MCHC RBC AUTO-ENTMCNC: 28.2 G/DL (ref 31–37)
MCV RBC AUTO: 75.2 FL (ref 74–97)
MONOCYTES # BLD: 1.4 K/UL (ref 0.05–1.2)
MONOCYTES NFR BLD: 13 % (ref 3–10)
NEUTS SEG # BLD: 7.6 K/UL (ref 1.8–8)
NEUTS SEG NFR BLD: 73 % (ref 40–73)
NITRITE UR QL STRIP.AUTO: NEGATIVE
PH UR STRIP: 6 [PH] (ref 5–8)
PLATELET # BLD AUTO: 323 K/UL (ref 135–420)
PMV BLD AUTO: 10.7 FL (ref 9.2–11.8)
PROT UR STRIP-MCNC: ABNORMAL MG/DL
RBC # BLD AUTO: 4.48 M/UL (ref 4.7–5.5)
RBC #/AREA URNS HPF: ABNORMAL /HPF (ref 0–5)
SP GR UR REFRACTOMETRY: 1.02 (ref 1–1.03)
UROBILINOGEN UR QL STRIP.AUTO: 0.2 EU/DL (ref 0.2–1)
WBC # BLD AUTO: 10.5 K/UL (ref 4.6–13.2)
WBC URNS QL MICRO: ABNORMAL /HPF (ref 0–4)

## 2020-10-02 PROCEDURE — 85025 COMPLETE CBC W/AUTO DIFF WBC: CPT

## 2020-10-02 PROCEDURE — 81001 URINALYSIS AUTO W/SCOPE: CPT

## 2021-11-22 NOTE — PROGRESS NOTES
2000- Cough Assist therapy was performed I:40/E:40 breaths x 5 cycles. Sxn Large white secretions. Patient's vitals were stable through out the procedure. 0561- Cough Assist therapy was performed I:40/E:40 breaths x 5 cycles. Sxn Large white secretions. Patient's vitals were stable through out the procedure. 0400-  Cough Assist therapy was performed I:40/E:40 breaths x 5 cycles. Sxn Large white secretions. Patient's vitals were stable through out the procedure. Patient calling to ask when, post procedure, he can start his coumadin again. Please give him a call at either number.   Telephone Information:   Mobile [phone] (home)

## 2021-12-16 PROBLEM — E87.20 LACTIC ACIDOSIS: Status: ACTIVE | Noted: 2021-12-16

## 2021-12-16 PROBLEM — T68.XXXA HYPOTHERMIA: Status: ACTIVE | Noted: 2021-12-16

## 2021-12-16 PROBLEM — N30.01 ACUTE CYSTITIS WITH HEMATURIA: Status: ACTIVE | Noted: 2021-06-22

## 2021-12-16 PROBLEM — R41.82 ALTERED MENTAL STATUS: Status: ACTIVE | Noted: 2019-09-28

## 2021-12-16 PROBLEM — R06.00 DYSPNEA: Status: ACTIVE | Noted: 2021-12-16

## 2021-12-16 PROBLEM — R53.1 WEAKNESS: Status: ACTIVE | Noted: 2021-12-16

## 2021-12-16 PROBLEM — E87.20 METABOLIC ACIDOSIS: Status: ACTIVE | Noted: 2019-09-28

## 2021-12-16 PROBLEM — R00.0 SINUS TACHYCARDIA: Status: ACTIVE | Noted: 2021-12-16

## 2021-12-16 PROBLEM — M62.838 MUSCLE SPASMS OF LOWER EXTREMITY: Status: ACTIVE | Noted: 2021-12-16

## 2022-06-23 NOTE — CONSULTS
[facility]  Name: Jose Alejandro Gonzales   : 1980   MRN: [id number]   Date: 2019    []I have reviewed the flowsheet and previous days notes. []The patient is unable to give any meaningful history or review of systems because the patient is:  []Intubated []Sedated   []Unresponsive      []The patient is critically ill on      []Mechanical ventilation []Pressors   []BiPAP []   S: patient in no acute distress. Was able to hold a conversation. Was alert and oriented. ROS:Eyes: negative    Events and notes from last 24 hours reviewed. Care plan discussed on multidisciplinary rounds. Vital Signs:    Visit Vitals  BP (!) 150/106 (BP 1 Location: Left arm, BP Patient Position: At rest) Comment: RN INFORMED   Pulse 84   Temp 98.3 °F (36.8 °C)   Resp 18   Ht 5' 7\" (1.702 m)   Wt 65.2 kg (143 lb 11.8 oz) Comment: extra blankets for seizure precaution   SpO2 100%   BMI 22.51 kg/m²       O2 Device: Tracheal collar   O2 Flow Rate (L/min): 8 l/min   Temp (24hrs), Av.5 °F (36.9 °C), Min:98.3 °F (36.8 °C), Max:98.8 °F (37.1 °C)       Intake/Output:   Last shift:       0701 -  1900  In: -   Out: 100   Last 3 shifts:  1901 -  0700  In: 2960 [P.O.:1560; I.V.:1400]  Out: 5020 [Urine:4250]    Intake/Output Summary (Last 24 hours) at 2019 0809  Last data filed at 2019 0745  Gross per 24 hour   Intake 1780 ml   Output 3895 ml   Net -2115 ml     Hemodynamics:       :      Ventilator Settings:                Physical Exam:    General: in no apparent distress, well developed and well nourished, non-toxic, in no respiratory distress and acyanotic, alert, oriented times 3 and afebrile   HEENT: tracheostomy tubing in place, able to speak once trach opening occluded   Neck: No abnormally enlarged lymph nodes.    Chest: symmetric   Lungs: rhonchi  no dullness/ tenderness/ rash   Heart: Regular rate and rhythm, S1S2 present or without murmur or extra heart sounds   Abdomen: not examined   Extremity: no edema, no clubbing   Neuro: alert   Skin: no rashes, dry and scaly scalp      DATA:   Current Facility-Administered Medications   Medication Dose Route Frequency    vancomycin (VANCOCIN) 1,250 mg in 0.9% sodium chloride 250 mL IVPB  1,250 mg IntraVENous Q12H    [START ON 2/26/2019] VANCOMYCIN INFORMATION NOTE   Other ONCE    sodium chloride (NS) flush 5-10 mL  5-10 mL IntraVENous PRN    acetaminophen (TYLENOL) tablet 650 mg  650 mg Oral Q4H PRN    cyclobenzaprine (FLEXERIL) tablet 10 mg  10 mg Oral TID PRN    levETIRAcetam (KEPPRA) tablet 250 mg  250 mg Oral Q12H    mirtazapine (REMERON) tablet 7.5 mg  7.5 mg Oral QHS    fentaNYL (DURAGESIC) 25 mcg/hr patch 1 Patch  1 Patch TransDERmal Q72H    DULoxetine (CYMBALTA) capsule 60 mg  60 mg Oral DAILY    therapeutic multivitamin (THERAGRAN) tablet 1 Tab  1 Tab Oral DAILY    pantoprazole (PROTONIX) tablet 40 mg  40 mg Oral DAILY    sodium chloride (NS) flush 5-40 mL  5-40 mL IntraVENous Q8H    sodium chloride (NS) flush 5-40 mL  5-40 mL IntraVENous PRN    enoxaparin (LOVENOX) injection 40 mg  40 mg SubCUTAneous Q24H    insulin lispro (HUMALOG) injection   SubCUTAneous Q6H    glucose chewable tablet 16 g  4 Tab Oral PRN    glucagon (GLUCAGEN) injection 1 mg  1 mg IntraMUSCular PRN    dextrose (D50) infusion 12.5-25 g  25-50 mL IntraVENous PRN    ELECTROLYTE REPLACEMENT PROTOCOL  1 Each Other PRN    ELECTROLYTE REPLACEMENT PROTOCOL  1 Each Other PRN    ELECTROLYTE REPLACEMENT PROTOCOL  1 Each Other PRN    ELECTROLYTE REPLACEMENT PROTOCOL  1 Each Other PRN    piperacillin-tazobactam (ZOSYN) 3.375 g in 0.9% sodium chloride (MBP/ADV) 100 mL MBP \"EXTENDED 4 HOUR INFUSION###\"  3.375 g IntraVENous Q8H    VANCOMYCIN INFORMATION NOTE   Other Rx Dosing/Monitoring       Telemetry: []Sinus []A-flutter []Paced    []A-fib []Multiple PVCs                  Labs:  Recent Labs     02/25/19  0425 02/24/19  0445 02/23/19  0045   WBC 3.9* 8. 1 14.2*   HGB 9.3* 9.3* 10.3*   HCT 25.6* 30.8* 33.2*    331 336     Recent Labs     02/25/19  0425 02/24/19  0445 02/23/19  0720 02/23/19  0045    139 141 138   K 3.7 3.4* 3.8 3.4*    104 110* 102   CO2 26 28 26 25   * 74 99 109*   BUN 8 7 13 18   CREA 0.77 0.50* 0.55* 0.74   CA 8.9 8.1* 7.5* 8.6   ALB  --   --   --  3.1*   SGOT  --   --   --  12*   ALT  --   --   --  15*     No results for input(s): PH, PCO2, PO2, HCO3, FIO2 in the last 72 hours.     Imaging:  []I have personally reviewed the patients radiographs  []Radiographs reviewed with radiologist   [] No CXR study available for review today   []No change from prior, tubes and lines in adequate position  []Improved   [x]Worsening       IMPRESSION:   Patient Active Problem List   Diagnosis Code    Sepsis ([facility] [address] A41.9    UTI (urinary tract infection) N39.0    DM (diabetes mellitus) ([address] E11.9    Acute on chronic respiratory failure with hypoxia ([facility] [address] J96.21    GERD (gastroesophageal reflux disease) K21.9    Quadriplegia ([address] G82.50    Seizures ([address] R56.9   Acute on Chronic Respiratory Failure  - 2/2 mucus plugging, possible PNA seen on CT scan  - #6 cuffless trach in place, normally on TC  LLL consolidation  - mucus plugging +/- pneumonia  Quadriplegia  Fever/Leukocytosis       ·    PLAN:   · Continue present therapy, discussed with RRT  · F/U his portable CXR intermittently     The patient is: [x] acutely ill Risk of deterioration: [x] moderate    [] critically ill  [] high     []See my orders for details    My assessment, plan of care, findings, medications, side effects etc were discussed with:  []nursing []PT/OT    [x]respiratory therapy []Dr. Edi Olivares []     []Total critical care time exclusive of procedures  20     minutes  Juan Jose Kim MD General

## 2023-01-16 NOTE — PROGRESS NOTES
Problem: Falls - Risk of  Goal: *Absence of Falls  Document [place] Fall Risk and appropriate interventions in the flowsheet. Outcome: Progressing Towards Goal  Fall Risk Interventions:       Mentation Interventions: Door open when patient unattended    Medication Interventions: Patient to call before getting OOB    Elimination Interventions: Call light in reach             Problem: Pressure Injury - Risk of  Goal: *Prevention of pressure injury  Document [place] Scale and appropriate interventions in the flowsheet. Outcome: Progressing Towards Goal  Pressure Injury Interventions:  Sensory Interventions: Assess changes in LOC    Moisture Interventions: Absorbent underpads    Activity Interventions: Pressure redistribution bed/mattress(bed type)    Mobility Interventions: Pressure redistribution bed/mattress (bed type)    Nutrition Interventions: Document food/fluid/supplement intake    Friction and Shear Interventions: Foam dressings/transparent film/skin sealants               Problem: Impaired Skin Integrity/Pressure Injury Treatment  Goal: *Prevention of pressure injury  Document [place] Scale and appropriate interventions in the flowsheet.   Outcome: Progressing Towards Goal  Pressure Injury Interventions:  Sensory Interventions: Assess changes in LOC    Moisture Interventions: Absorbent underpads    Activity Interventions: Pressure redistribution bed/mattress(bed type)    Mobility Interventions: Pressure redistribution bed/mattress (bed type)    Nutrition Interventions: Document food/fluid/supplement intake    Friction and Shear Interventions: Foam dressings/transparent film/skin sealants Speaking Coherently